# Patient Record
Sex: FEMALE | Race: WHITE | NOT HISPANIC OR LATINO | URBAN - METROPOLITAN AREA
[De-identification: names, ages, dates, MRNs, and addresses within clinical notes are randomized per-mention and may not be internally consistent; named-entity substitution may affect disease eponyms.]

---

## 2018-02-12 ENCOUNTER — INPATIENT (INPATIENT)
Facility: HOSPITAL | Age: 78
LOS: 9 days | Discharge: HOME | DRG: 208 | End: 2018-02-22
Attending: INTERNAL MEDICINE | Admitting: INTERNAL MEDICINE
Payer: MEDICARE

## 2018-02-12 VITALS
HEIGHT: 63 IN | DIASTOLIC BLOOD PRESSURE: 58 MMHG | HEART RATE: 114 BPM | OXYGEN SATURATION: 100 % | RESPIRATION RATE: 20 BRPM | SYSTOLIC BLOOD PRESSURE: 161 MMHG | WEIGHT: 179.9 LBS

## 2018-02-12 DIAGNOSIS — G35 MULTIPLE SCLEROSIS: ICD-10-CM

## 2018-02-12 DIAGNOSIS — I63.9 CEREBRAL INFARCTION, UNSPECIFIED: ICD-10-CM

## 2018-02-12 DIAGNOSIS — J96.00 ACUTE RESPIRATORY FAILURE, UNSPECIFIED WHETHER WITH HYPOXIA OR HYPERCAPNIA: ICD-10-CM

## 2018-02-12 DIAGNOSIS — R90.89 OTHER ABNORMAL FINDINGS ON DIAGNOSTIC IMAGING OF CENTRAL NERVOUS SYSTEM: ICD-10-CM

## 2018-02-12 DIAGNOSIS — I10 ESSENTIAL (PRIMARY) HYPERTENSION: ICD-10-CM

## 2018-02-12 DIAGNOSIS — E11.9 TYPE 2 DIABETES MELLITUS WITHOUT COMPLICATIONS: ICD-10-CM

## 2018-02-12 DIAGNOSIS — Z29.9 ENCOUNTER FOR PROPHYLACTIC MEASURES, UNSPECIFIED: ICD-10-CM

## 2018-02-12 DIAGNOSIS — J96.90 RESPIRATORY FAILURE, UNSPECIFIED, UNSPECIFIED WHETHER WITH HYPOXIA OR HYPERCAPNIA: ICD-10-CM

## 2018-02-12 DIAGNOSIS — E03.9 HYPOTHYROIDISM, UNSPECIFIED: ICD-10-CM

## 2018-02-12 PROBLEM — Z00.00 ENCOUNTER FOR PREVENTIVE HEALTH EXAMINATION: Status: ACTIVE | Noted: 2018-02-12

## 2018-02-12 LAB
ALBUMIN SERPL ELPH-MCNC: 2.5 G/DL — LOW (ref 3.5–5)
ALP SERPL-CCNC: 63 U/L — SIGNIFICANT CHANGE UP (ref 40–120)
ALT FLD-CCNC: 17 U/L DA — SIGNIFICANT CHANGE UP (ref 10–60)
ANION GAP SERPL CALC-SCNC: 14 MMOL/L — SIGNIFICANT CHANGE UP (ref 5–17)
ANION GAP SERPL CALC-SCNC: 14 MMOL/L — SIGNIFICANT CHANGE UP (ref 5–17)
ANION GAP SERPL CALC-SCNC: 15 MMOL/L — SIGNIFICANT CHANGE UP (ref 5–17)
APPEARANCE UR: ABNORMAL
APTT BLD: 30.1 SEC — SIGNIFICANT CHANGE UP (ref 27.5–37.4)
AST SERPL-CCNC: 36 U/L — SIGNIFICANT CHANGE UP (ref 10–40)
BASE EXCESS BLDA CALC-SCNC: -6.1 MMOL/L — LOW (ref -2–2)
BASOPHILS # BLD AUTO: 0.1 K/UL — SIGNIFICANT CHANGE UP (ref 0–0.2)
BASOPHILS NFR BLD AUTO: 0.7 % — SIGNIFICANT CHANGE UP (ref 0–2)
BILIRUB SERPL-MCNC: 0.6 MG/DL — SIGNIFICANT CHANGE UP (ref 0.2–1.2)
BILIRUB UR-MCNC: NEGATIVE — SIGNIFICANT CHANGE UP
BLOOD GAS COMMENTS ARTERIAL: SIGNIFICANT CHANGE UP
BLOOD GAS COMMENTS ARTERIAL: SIGNIFICANT CHANGE UP
BUN SERPL-MCNC: 38 MG/DL — HIGH (ref 7–18)
BUN SERPL-MCNC: 38 MG/DL — HIGH (ref 7–18)
BUN SERPL-MCNC: 41 MG/DL — HIGH (ref 7–18)
CALCIUM SERPL-MCNC: 7.6 MG/DL — LOW (ref 8.4–10.5)
CALCIUM SERPL-MCNC: 7.9 MG/DL — LOW (ref 8.4–10.5)
CALCIUM SERPL-MCNC: 8.1 MG/DL — LOW (ref 8.4–10.5)
CHLORIDE SERPL-SCNC: 100 MMOL/L — SIGNIFICANT CHANGE UP (ref 96–108)
CHLORIDE SERPL-SCNC: 104 MMOL/L — SIGNIFICANT CHANGE UP (ref 96–108)
CHLORIDE SERPL-SCNC: 106 MMOL/L — SIGNIFICANT CHANGE UP (ref 96–108)
CK MB BLD-MCNC: 1.6 % — SIGNIFICANT CHANGE UP (ref 0–3.5)
CK MB CFR SERPL CALC: 2.1 NG/ML — SIGNIFICANT CHANGE UP (ref 0–3.6)
CK SERPL-CCNC: 132 U/L — SIGNIFICANT CHANGE UP (ref 21–215)
CK SERPL-CCNC: 155 U/L — SIGNIFICANT CHANGE UP (ref 21–215)
CO2 SERPL-SCNC: 19 MMOL/L — LOW (ref 22–31)
CO2 SERPL-SCNC: 20 MMOL/L — LOW (ref 22–31)
CO2 SERPL-SCNC: 21 MMOL/L — LOW (ref 22–31)
COLOR SPEC: ABNORMAL
CREAT SERPL-MCNC: 2.45 MG/DL — HIGH (ref 0.5–1.3)
CREAT SERPL-MCNC: 2.65 MG/DL — HIGH (ref 0.5–1.3)
CREAT SERPL-MCNC: 2.77 MG/DL — HIGH (ref 0.5–1.3)
DIFF PNL FLD: ABNORMAL
EOSINOPHIL # BLD AUTO: 0 K/UL — SIGNIFICANT CHANGE UP (ref 0–0.5)
EOSINOPHIL NFR BLD AUTO: 0 % — SIGNIFICANT CHANGE UP (ref 0–6)
FLUBV RNA SPEC QL NAA+PROBE: DETECTED
GLUCOSE BLDC GLUCOMTR-MCNC: 108 MG/DL — HIGH (ref 70–99)
GLUCOSE BLDC GLUCOMTR-MCNC: 119 MG/DL — HIGH (ref 70–99)
GLUCOSE BLDC GLUCOMTR-MCNC: 65 MG/DL — LOW (ref 70–99)
GLUCOSE BLDC GLUCOMTR-MCNC: 99 MG/DL — SIGNIFICANT CHANGE UP (ref 70–99)
GLUCOSE SERPL-MCNC: 136 MG/DL — HIGH (ref 70–99)
GLUCOSE SERPL-MCNC: 149 MG/DL — HIGH (ref 70–99)
GLUCOSE SERPL-MCNC: 89 MG/DL — SIGNIFICANT CHANGE UP (ref 70–99)
GLUCOSE UR QL: NEGATIVE — SIGNIFICANT CHANGE UP
HCO3 BLDA-SCNC: 19 MMOL/L — LOW (ref 23–27)
HCT VFR BLD CALC: 35.2 % — SIGNIFICANT CHANGE UP (ref 34.5–45)
HGB BLD-MCNC: 11.3 G/DL — LOW (ref 11.5–15.5)
HOROWITZ INDEX BLDA+IHG-RTO: 50 — SIGNIFICANT CHANGE UP
INR BLD: 1.15 RATIO — SIGNIFICANT CHANGE UP (ref 0.88–1.16)
KETONES UR-MCNC: ABNORMAL
LACTATE SERPL-SCNC: 2.2 MMOL/L — HIGH (ref 0.7–2)
LACTATE SERPL-SCNC: 3.3 MMOL/L — HIGH (ref 0.7–2)
LACTATE SERPL-SCNC: 5.6 MMOL/L — CRITICAL HIGH (ref 0.7–2)
LEUKOCYTE ESTERASE UR-ACNC: ABNORMAL
LYMPHOCYTES # BLD AUTO: 0.7 K/UL — LOW (ref 1–3.3)
LYMPHOCYTES # BLD AUTO: 6.6 % — LOW (ref 13–44)
MCHC RBC-ENTMCNC: 29.2 PG — SIGNIFICANT CHANGE UP (ref 27–34)
MCHC RBC-ENTMCNC: 32.2 GM/DL — SIGNIFICANT CHANGE UP (ref 32–36)
MCV RBC AUTO: 90.8 FL — SIGNIFICANT CHANGE UP (ref 80–100)
MONOCYTES # BLD AUTO: 1.1 K/UL — HIGH (ref 0–0.9)
MONOCYTES NFR BLD AUTO: 10.3 % — SIGNIFICANT CHANGE UP (ref 2–14)
NEUTROPHILS # BLD AUTO: 8.6 K/UL — HIGH (ref 1.8–7.4)
NEUTROPHILS NFR BLD AUTO: 82.4 % — HIGH (ref 43–77)
NITRITE UR-MCNC: NEGATIVE — SIGNIFICANT CHANGE UP
NT-PROBNP SERPL-SCNC: 2575 PG/ML — HIGH (ref 0–450)
PCO2 BLDA: 38 MMHG — SIGNIFICANT CHANGE UP (ref 32–46)
PH BLDA: 7.32 — LOW (ref 7.35–7.45)
PH UR: 6 — SIGNIFICANT CHANGE UP (ref 5–8)
PLATELET # BLD AUTO: 175 K/UL — SIGNIFICANT CHANGE UP (ref 150–400)
PO2 BLDA: 95 MMHG — SIGNIFICANT CHANGE UP (ref 74–108)
POTASSIUM SERPL-MCNC: 4.5 MMOL/L — SIGNIFICANT CHANGE UP (ref 3.5–5.3)
POTASSIUM SERPL-MCNC: 4.5 MMOL/L — SIGNIFICANT CHANGE UP (ref 3.5–5.3)
POTASSIUM SERPL-MCNC: 5.5 MMOL/L — HIGH (ref 3.5–5.3)
POTASSIUM SERPL-SCNC: 4.5 MMOL/L — SIGNIFICANT CHANGE UP (ref 3.5–5.3)
POTASSIUM SERPL-SCNC: 4.5 MMOL/L — SIGNIFICANT CHANGE UP (ref 3.5–5.3)
POTASSIUM SERPL-SCNC: 5.5 MMOL/L — HIGH (ref 3.5–5.3)
PROT SERPL-MCNC: 6.4 G/DL — SIGNIFICANT CHANGE UP (ref 6–8.3)
PROT UR-MCNC: 100
PROTHROM AB SERPL-ACNC: 12.6 SEC — SIGNIFICANT CHANGE UP (ref 9.8–12.7)
RAPID RVP RESULT: DETECTED
RBC # BLD: 3.88 M/UL — SIGNIFICANT CHANGE UP (ref 3.8–5.2)
RBC # FLD: 13.4 % — SIGNIFICANT CHANGE UP (ref 10.3–14.5)
SAO2 % BLDA: 96 % — SIGNIFICANT CHANGE UP (ref 92–96)
SODIUM SERPL-SCNC: 135 MMOL/L — SIGNIFICANT CHANGE UP (ref 135–145)
SODIUM SERPL-SCNC: 139 MMOL/L — SIGNIFICANT CHANGE UP (ref 135–145)
SODIUM SERPL-SCNC: 139 MMOL/L — SIGNIFICANT CHANGE UP (ref 135–145)
SP GR SPEC: 1.01 — SIGNIFICANT CHANGE UP (ref 1.01–1.02)
TROPONIN I SERPL-MCNC: 0.1 NG/ML — HIGH (ref 0–0.04)
TROPONIN I SERPL-MCNC: 0.11 NG/ML — HIGH (ref 0–0.04)
TSH SERPL-MCNC: 1.12 UU/ML — SIGNIFICANT CHANGE UP (ref 0.34–4.82)
UROBILINOGEN FLD QL: NEGATIVE — SIGNIFICANT CHANGE UP
WBC # BLD: 10.4 K/UL — SIGNIFICANT CHANGE UP (ref 3.8–10.5)
WBC # FLD AUTO: 10.4 K/UL — SIGNIFICANT CHANGE UP (ref 3.8–10.5)

## 2018-02-12 PROCEDURE — 71045 X-RAY EXAM CHEST 1 VIEW: CPT | Mod: 26,77

## 2018-02-12 PROCEDURE — 99291 CRITICAL CARE FIRST HOUR: CPT | Mod: 25

## 2018-02-12 PROCEDURE — 70450 CT HEAD/BRAIN W/O DYE: CPT | Mod: 26

## 2018-02-12 PROCEDURE — 31605 EMER TRACHEOSTOMY CTHYR MEM: CPT

## 2018-02-12 PROCEDURE — 71045 X-RAY EXAM CHEST 1 VIEW: CPT | Mod: 26

## 2018-02-12 PROCEDURE — 99291 CRITICAL CARE FIRST HOUR: CPT

## 2018-02-12 RX ORDER — PROPOFOL 10 MG/ML
5 INJECTION, EMULSION INTRAVENOUS
Qty: 1000 | Refills: 0 | Status: DISCONTINUED | OUTPATIENT
Start: 2018-02-12 | End: 2018-02-12

## 2018-02-12 RX ORDER — SODIUM CHLORIDE 9 MG/ML
1000 INJECTION INTRAMUSCULAR; INTRAVENOUS; SUBCUTANEOUS ONCE
Qty: 0 | Refills: 0 | Status: COMPLETED | OUTPATIENT
Start: 2018-02-12 | End: 2018-02-12

## 2018-02-12 RX ORDER — CITALOPRAM 10 MG/1
5 TABLET, FILM COATED ORAL AT BEDTIME
Qty: 0 | Refills: 0 | Status: DISCONTINUED | OUTPATIENT
Start: 2018-02-12 | End: 2018-02-12

## 2018-02-12 RX ORDER — DONEPEZIL HYDROCHLORIDE 10 MG/1
10 TABLET, FILM COATED ORAL AT BEDTIME
Qty: 0 | Refills: 0 | Status: DISCONTINUED | OUTPATIENT
Start: 2018-02-12 | End: 2018-02-22

## 2018-02-12 RX ORDER — PHENYLEPHRINE HYDROCHLORIDE 10 MG/ML
1 INJECTION INTRAVENOUS
Qty: 160 | Refills: 0 | Status: DISCONTINUED | OUTPATIENT
Start: 2018-02-12 | End: 2018-02-13

## 2018-02-12 RX ORDER — FENTANYL CITRATE 50 UG/ML
0.5 INJECTION INTRAVENOUS
Qty: 2500 | Refills: 0 | Status: DISCONTINUED | OUTPATIENT
Start: 2018-02-12 | End: 2018-02-13

## 2018-02-12 RX ORDER — SODIUM CHLORIDE 9 MG/ML
2000 INJECTION INTRAMUSCULAR; INTRAVENOUS; SUBCUTANEOUS ONCE
Qty: 0 | Refills: 0 | Status: COMPLETED | OUTPATIENT
Start: 2018-02-12 | End: 2018-02-12

## 2018-02-12 RX ORDER — AZITHROMYCIN 500 MG/1
500 TABLET, FILM COATED ORAL EVERY 24 HOURS
Qty: 0 | Refills: 0 | Status: DISCONTINUED | OUTPATIENT
Start: 2018-02-12 | End: 2018-02-17

## 2018-02-12 RX ORDER — MIDAZOLAM HYDROCHLORIDE 1 MG/ML
2 INJECTION, SOLUTION INTRAMUSCULAR; INTRAVENOUS ONCE
Qty: 0 | Refills: 0 | Status: DISCONTINUED | OUTPATIENT
Start: 2018-02-12 | End: 2018-02-12

## 2018-02-12 RX ORDER — CHLORHEXIDINE GLUCONATE 213 G/1000ML
1 SOLUTION TOPICAL DAILY
Qty: 0 | Refills: 0 | Status: DISCONTINUED | OUTPATIENT
Start: 2018-02-12 | End: 2018-02-22

## 2018-02-12 RX ORDER — RALOXIFENE HYDROCHLORIDE 60 MG/1
60 TABLET, COATED ORAL DAILY
Qty: 0 | Refills: 0 | Status: DISCONTINUED | OUTPATIENT
Start: 2018-02-12 | End: 2018-02-22

## 2018-02-12 RX ORDER — ETOMIDATE 2 MG/ML
20 INJECTION INTRAVENOUS ONCE
Qty: 0 | Refills: 0 | Status: COMPLETED | OUTPATIENT
Start: 2018-02-12 | End: 2018-02-12

## 2018-02-12 RX ORDER — SODIUM CHLORIDE 9 MG/ML
1000 INJECTION, SOLUTION INTRAVENOUS
Qty: 0 | Refills: 0 | Status: DISCONTINUED | OUTPATIENT
Start: 2018-02-12 | End: 2018-02-13

## 2018-02-12 RX ORDER — MIDAZOLAM HYDROCHLORIDE 1 MG/ML
2 INJECTION, SOLUTION INTRAMUSCULAR; INTRAVENOUS
Qty: 100 | Refills: 0 | Status: DISCONTINUED | OUTPATIENT
Start: 2018-02-12 | End: 2018-02-12

## 2018-02-12 RX ORDER — ASPIRIN/CALCIUM CARB/MAGNESIUM 324 MG
81 TABLET ORAL DAILY
Qty: 0 | Refills: 0 | Status: DISCONTINUED | OUTPATIENT
Start: 2018-02-12 | End: 2018-02-22

## 2018-02-12 RX ORDER — LEVOTHYROXINE SODIUM 125 MCG
25 TABLET ORAL DAILY
Qty: 0 | Refills: 0 | Status: DISCONTINUED | OUTPATIENT
Start: 2018-02-12 | End: 2018-02-17

## 2018-02-12 RX ORDER — ACETAMINOPHEN 500 MG
650 TABLET ORAL ONCE
Qty: 0 | Refills: 0 | Status: COMPLETED | OUTPATIENT
Start: 2018-02-12 | End: 2018-02-12

## 2018-02-12 RX ORDER — BACLOFEN 100 %
10 POWDER (GRAM) MISCELLANEOUS THREE TIMES A DAY
Qty: 0 | Refills: 0 | Status: DISCONTINUED | OUTPATIENT
Start: 2018-02-12 | End: 2018-02-22

## 2018-02-12 RX ORDER — HEPARIN SODIUM 5000 [USP'U]/ML
5000 INJECTION INTRAVENOUS; SUBCUTANEOUS EVERY 12 HOURS
Qty: 0 | Refills: 0 | Status: DISCONTINUED | OUTPATIENT
Start: 2018-02-12 | End: 2018-02-22

## 2018-02-12 RX ORDER — DEXTROSE 50 % IN WATER 50 %
25 SYRINGE (ML) INTRAVENOUS ONCE
Qty: 0 | Refills: 0 | Status: COMPLETED | OUTPATIENT
Start: 2018-02-12 | End: 2018-02-12

## 2018-02-12 RX ORDER — CITALOPRAM 10 MG/1
5 TABLET, FILM COATED ORAL AT BEDTIME
Qty: 0 | Refills: 0 | Status: DISCONTINUED | OUTPATIENT
Start: 2018-02-12 | End: 2018-02-22

## 2018-02-12 RX ORDER — ACETAMINOPHEN 500 MG
650 TABLET ORAL EVERY 6 HOURS
Qty: 0 | Refills: 0 | Status: DISCONTINUED | OUTPATIENT
Start: 2018-02-12 | End: 2018-02-22

## 2018-02-12 RX ORDER — CEFTRIAXONE 500 MG/1
1 INJECTION, POWDER, FOR SOLUTION INTRAMUSCULAR; INTRAVENOUS EVERY 24 HOURS
Qty: 0 | Refills: 0 | Status: DISCONTINUED | OUTPATIENT
Start: 2018-02-12 | End: 2018-02-19

## 2018-02-12 RX ORDER — INSULIN LISPRO 100/ML
VIAL (ML) SUBCUTANEOUS
Qty: 0 | Refills: 0 | Status: DISCONTINUED | OUTPATIENT
Start: 2018-02-12 | End: 2018-02-13

## 2018-02-12 RX ORDER — ATORVASTATIN CALCIUM 80 MG/1
20 TABLET, FILM COATED ORAL AT BEDTIME
Qty: 0 | Refills: 0 | Status: DISCONTINUED | OUTPATIENT
Start: 2018-02-12 | End: 2018-02-22

## 2018-02-12 RX ORDER — PIPERACILLIN AND TAZOBACTAM 4; .5 G/20ML; G/20ML
3.38 INJECTION, POWDER, LYOPHILIZED, FOR SOLUTION INTRAVENOUS ONCE
Qty: 0 | Refills: 0 | Status: COMPLETED | OUTPATIENT
Start: 2018-02-12 | End: 2018-02-12

## 2018-02-12 RX ORDER — PANTOPRAZOLE SODIUM 20 MG/1
40 TABLET, DELAYED RELEASE ORAL DAILY
Qty: 0 | Refills: 0 | Status: DISCONTINUED | OUTPATIENT
Start: 2018-02-12 | End: 2018-02-22

## 2018-02-12 RX ORDER — LOSARTAN POTASSIUM 100 MG/1
50 TABLET, FILM COATED ORAL DAILY
Qty: 0 | Refills: 0 | Status: DISCONTINUED | OUTPATIENT
Start: 2018-02-12 | End: 2018-02-12

## 2018-02-12 RX ORDER — MIDAZOLAM HYDROCHLORIDE 1 MG/ML
4 INJECTION, SOLUTION INTRAMUSCULAR; INTRAVENOUS ONCE
Qty: 0 | Refills: 0 | Status: DISCONTINUED | OUTPATIENT
Start: 2018-02-12 | End: 2018-02-12

## 2018-02-12 RX ADMIN — Medication 30 MILLIGRAM(S): at 20:05

## 2018-02-12 RX ADMIN — SODIUM CHLORIDE 2000 MILLILITER(S): 9 INJECTION INTRAMUSCULAR; INTRAVENOUS; SUBCUTANEOUS at 14:28

## 2018-02-12 RX ADMIN — ETOMIDATE 20 MILLIGRAM(S): 2 INJECTION INTRAVENOUS at 09:58

## 2018-02-12 RX ADMIN — Medication 81 MILLIGRAM(S): at 22:52

## 2018-02-12 RX ADMIN — CHLORHEXIDINE GLUCONATE 1 APPLICATION(S): 213 SOLUTION TOPICAL at 20:03

## 2018-02-12 RX ADMIN — MIDAZOLAM HYDROCHLORIDE 2 MG/HR: 1 INJECTION, SOLUTION INTRAMUSCULAR; INTRAVENOUS at 10:08

## 2018-02-12 RX ADMIN — SODIUM CHLORIDE 2000 MILLILITER(S): 9 INJECTION INTRAMUSCULAR; INTRAVENOUS; SUBCUTANEOUS at 09:28

## 2018-02-12 RX ADMIN — PIPERACILLIN AND TAZOBACTAM 200 GRAM(S): 4; .5 INJECTION, POWDER, LYOPHILIZED, FOR SOLUTION INTRAVENOUS at 10:50

## 2018-02-12 RX ADMIN — DONEPEZIL HYDROCHLORIDE 10 MILLIGRAM(S): 10 TABLET, FILM COATED ORAL at 23:17

## 2018-02-12 RX ADMIN — Medication 25 MILLILITER(S): at 18:00

## 2018-02-12 RX ADMIN — PHENYLEPHRINE HYDROCHLORIDE 11.91 MICROGRAM(S)/KG/MIN: 10 INJECTION INTRAVENOUS at 19:59

## 2018-02-12 RX ADMIN — SODIUM CHLORIDE 4000 MILLILITER(S): 9 INJECTION INTRAMUSCULAR; INTRAVENOUS; SUBCUTANEOUS at 20:05

## 2018-02-12 RX ADMIN — Medication 10 MILLIGRAM(S): at 23:05

## 2018-02-12 RX ADMIN — Medication 650 MILLIGRAM(S): at 10:09

## 2018-02-12 RX ADMIN — CEFTRIAXONE 100 GRAM(S): 500 INJECTION, POWDER, FOR SOLUTION INTRAMUSCULAR; INTRAVENOUS at 18:26

## 2018-02-12 RX ADMIN — SODIUM CHLORIDE 100 MILLILITER(S): 9 INJECTION, SOLUTION INTRAVENOUS at 20:01

## 2018-02-12 RX ADMIN — ATORVASTATIN CALCIUM 20 MILLIGRAM(S): 80 TABLET, FILM COATED ORAL at 22:52

## 2018-02-12 RX ADMIN — FENTANYL CITRATE 1.59 MICROGRAM(S)/KG/HR: 50 INJECTION INTRAVENOUS at 22:53

## 2018-02-12 RX ADMIN — HEPARIN SODIUM 5000 UNIT(S): 5000 INJECTION INTRAVENOUS; SUBCUTANEOUS at 18:30

## 2018-02-12 RX ADMIN — Medication 40 MILLIGRAM(S): at 18:31

## 2018-02-12 RX ADMIN — MIDAZOLAM HYDROCHLORIDE 4 MILLIGRAM(S): 1 INJECTION, SOLUTION INTRAMUSCULAR; INTRAVENOUS at 09:26

## 2018-02-12 RX ADMIN — MIDAZOLAM HYDROCHLORIDE 2 MILLIGRAM(S): 1 INJECTION, SOLUTION INTRAMUSCULAR; INTRAVENOUS at 10:08

## 2018-02-12 RX ADMIN — CITALOPRAM 5 MILLIGRAM(S): 10 TABLET, FILM COATED ORAL at 23:05

## 2018-02-12 RX ADMIN — Medication 40 MILLIGRAM(S): at 23:08

## 2018-02-12 NOTE — CONSULT NOTE ADULT - ASSESSMENT
Abnormal imaging brain, suggestive of age indeterminate infract in the patient with MS and prior strokes, neurological exam currently limited and baseline neurological exam is uknown.  Will recommend MRI brain with and w/o florentino when medically stable, will reevaluate for treatment after imaging.  Cw outpatient treatment for MS.

## 2018-02-12 NOTE — H&P ADULT - PMH
CVA (cerebral vascular accident)    Diabetes mellitus    Hypertension    Hypothyroidism    MS (multiple sclerosis)

## 2018-02-12 NOTE — H&P ADULT - ATTENDING COMMENTS
77 F from home, lives with friend PMH of MS, HTN, HLD , MS, CVA with left hemiparesis , bed bound at baseline was brought in by friend for respiratory distress. As per friend, she (herself) had a flu recently and she noticed pt doesn't look right on friday night , called the PCP , recommended to gave cold medication. Pt did not c/o of any pain or SOB but noticed coughing. This morning she was found o be making gurgling noises and secretion/mucus coming out of her mouth. EMS arrived found her to be in respiratory distress with low sPO2 and accessory muscle use , endotracheal intubation was attempted twice with 40 mg of etomidate but failed , oral airway was inserted and ventilation via BMV were continued.   Patient getting admitted to ICU for acute respiratory failure requiring mechanical ventilation , awaiting RVP and head CT.     Problem/Plan - 1:  ·  Problem: Acute respiratory failure.  Plan: Unclear etiology at this time , viral infection vs stroke   Follow up RVP   follow up CT head to rule out intracranial hemorrhage  Continue with ventilator support   follow up post intubation ABG and adjust setting accordingly.      Problem/Plan - 2:  ·  Problem: Hypertension.  Plan: Will hold home losartan 50 mg , restart if BP continues to trend high.      Problem/Plan - 3:  ·  Problem: MS (multiple sclerosis).  Plan: will continue with home baclofen   Neuro consult.      Problem/Plan - 4:  ·  Problem: Hypothyroidism.  Plan: Continue with hoem dose of levothyroxine   Follow up TSH.      Problem/Plan - 5:  ·  Problem: Diabetes mellitus.  Plan: Hold PO antihyperglycemic  Continue with HSS   Follow up HbA1c.      Problem/Plan - 6:  Problem: CVA (cerebral vascular accident).Plan: Patient has hx of CVA with left hemiparesis but only takes lipitor 20 mg ( not on aspirin)   Start aspirin if CT head -ve for intracranial bleed   C/w statin.     Problem/Plan - 7:  ·  Problem: Prophylactic measure.  Plan: Will start DVT ppx if CT head negative   C/w protonix for GI ppx.

## 2018-02-12 NOTE — ED PROVIDER NOTE - CRITICAL CARE PROVIDED
direct patient care (not related to procedure)/consultation with other physicians/conducted a detailed discussion of DNR status/documentation/additional history taking

## 2018-02-12 NOTE — H&P ADULT - PROBLEM SELECTOR PLAN 1
Unclear etiology at this time , viral infection vs stroke   Follow up RVP   follow up CT head to rule out intracranial hemorrhage  Continue with ventilator support   follow up post intubation ABG and adjust setting accordingly

## 2018-02-12 NOTE — H&P ADULT - PROBLEM SELECTOR PLAN 6
Patient has hx of CVA with left hemiparesis but only takes lipitor 20 mg ( not on aspirin)   Start aspirin if CT head -ve for intracranial bleed   C/w statin

## 2018-02-12 NOTE — CONSULT NOTE ADULT - SUBJECTIVE AND OBJECTIVE BOX
Patient is a 77y old  Female who presents with a chief complaint of Respiratory failure (2018 10:41)      HPI:  77 F from home, lives with friend PMH of MS, HTN, HLD , MS, CVA with left hemiparesis , bed bound at baseline was brought in by friend for respiratory distress. As per friend, she (herself) had a flu recently and she noticed pt doesn't look right on friday night , called the PCP , recommended to gave cold medication. Pt did not c/o of any pain or SOB but noticed coughing. This morning she was found o be making gurgling noises and secretion/mucus coming out of her mouth. EMS arrived found her to be in respiratory distress with low sPO2 and accessory muscle use , endotracheal intubation was attempted twice with 40 mg of etomidate but failed , oral airway was inserted and ventilation via BMV were continued.   As per friend she has no other family member and she takes care of her , reported having HCP at home and does not want her to be resuscitated if her heart stops; stated "if she goes let her go". (2018 10:41)         Neurological Review of Systems:  No difficulty with language.  No vision loss or double vision.  No dizziness, vertigo or new hearing loss.  No difficulty with speech or swallowing.  No focal weakness.  No focal sensory changes.  No numbness or tingling in the bilateral lower extremities.  No difficulty with balance.  No difficulty with ambulation.        MEDICATIONS  (STANDING):  aspirin  chewable 81 milliGRAM(s) Oral daily  atorvastatin 20 milliGRAM(s) Oral at bedtime  azithromycin  IVPB 500 milliGRAM(s) IV Intermittent every 24 hours  baclofen 10 milliGRAM(s) Oral three times a day  cefTRIAXone   IVPB 1 Gram(s) IV Intermittent every 24 hours  chlorhexidine 4% Liquid 1 Application(s) Topical daily  citalopram 5 milliGRAM(s) Oral at bedtime  dextrose 5% + sodium chloride 0.9%. 1000 milliLiter(s) (100 mL/Hr) IV Continuous <Continuous>  donepezil 10 milliGRAM(s) Oral at bedtime  heparin  Injectable 5000 Unit(s) SubCutaneous every 12 hours  insulin lispro (HumaLOG) corrective regimen sliding scale   SubCutaneous three times a day before meals  levothyroxine 25 MICROGram(s) Oral daily  methylPREDNISolone sodium succinate Injectable 40 milliGRAM(s) IV Push every 6 hours  midazolam Infusion 2 mG/Hr (2 mL/Hr) IV Continuous <Continuous>  pantoprazole  Injectable 40 milliGRAM(s) IV Push daily  raloxifene 60 milliGRAM(s) Oral daily    MEDICATIONS  (PRN):  acetaminophen    Suspension 650 milliGRAM(s) Oral every 6 hours PRN For Temp greater than 38 C (100.4 F)    Allergies    Allergy Status Unknown    Intolerances      PAST MEDICAL & SURGICAL HISTORY:  MS (multiple sclerosis)  CVA (cerebral vascular accident)  Diabetes mellitus  Hypertension  Hypothyroidism    FAMILY HISTORY:    SOCIAL HISTORY: non smoker/ former smoker/ active smoker    Review of Systems:  Constitutional: No generalized weakness. No fevers or chills.                    Eyes, Ears, Mouth, Throat: No vision loss   Respiratory: No shortness of breath or cough.                                Cardiovascular: No chest pain or palpitations  Gastrointestinal: No nausea or vomiting.                                         Genitourinary: No urinary incontinence or burning on urination.  Musculoskeletal: No joint pain.                                                           Dermatologic: No rash.  Neurological: as per HPI                                                                      Psychiatric: No behavioral problems.  Endocrine: No known hypoglycemia.               Hematologic/Lymphatic: No easy bleeding.    O:  Vital Signs Last 24 Hrs  T(C): 38.2 (2018 08:56), Max: 38.2 (2018 08:56)  T(F): 100.7 (2018 08:56), Max: 100.7 (2018 08:56)  HR: 95 (2018 14:00) (89 - 114)  BP: 135/54 (2018 14:00) (82/50 - 161/58)  BP(mean): 76 (2018 14:00) (61 - 76)  RR: 14 (2018 14:00) (14 - 30)  SpO2: 96% (2018 14:00) (96% - 100%)    General Exam:   General appearance: No acute distress                 Cardiovascular: Pedal dorsalis pulses intact bilaterally    Mental Status: Orientated to self, date and place.  Attention intact.  No dysarthria, aphasia or neglect.  Knowledge intact.  Registration intact.  Short and long term memory grossly intact.      Cranial Nerves: CN I - not tested.  PERRL, EOMI, VFF, no nystagmus or diplopia.  No APD.  Fundi not visualized.  CN V1-3 intact to light touch and pinprick.  No facial asymmetry.  Hearing intact to finger rub bilaterally.  Tongue, uvula and palate midline.  Sternocleidomastoid and Trapezius intact bilaterally.    Motor:   Tone: normal.                  Strength intact throughout  No pronator drift bilaterally                      No dysmetria on finger-nose-finger or heel-shin-heel  No truncal ataxia.  No resting, postural or action tremor.  No myoclonus.    Sensation: intact to light touch, pinprick, vibration and proprioception    Deep Tendon Reflexes: 1+ bilateral biceps, triceps, brachioradialis, knee and ankle  Toes flexor bilaterally    Gait: normal and stable.  Rhomberg -mikel.    Other:     LABS:                        11.3   10.4  )-----------( 175      ( 2018 09:27 )             35.2     02-12    139  |  104  |  38<H>  ----------------------------<  149<H>  4.5   |  21<L>  |  2.65<H>    Ca    7.6<L>      2018 12:14    TPro  6.4  /  Alb  2.5<L>  /  TBili  0.6  /  DBili  x   /  AST  36  /  ALT  17  /  AlkPhos  63  02-12    PT/INR - ( 2018 09:27 )   PT: 12.6 sec;   INR: 1.15 ratio         PTT - ( 2018 09:27 )  PTT:30.1 sec  Urinalysis Basic - ( 2018 10:29 )    Color: Pink / Appearance: bloody / S.015 / pH: x  Gluc: x / Ketone: Trace  / Bili: Negative / Urobili: Negative   Blood: x / Protein: 100 / Nitrite: Negative   Leuk Esterase: Moderate / RBC: >50 /HPF / WBC 3-5 /HPF   Sq Epi: x / Non Sq Epi: Occasional /HPF / Bacteria: Trace /HPF          RADIOLOGY & ADDITIONAL STUDIES:    EKG:   < from: CT Head No Cont (18 @ 12:25) > (images reviewed)  IMPRESSION:   No acute intracranial hemorrhage, mass effect or midline shift.    Age-indeterminate left inferior cerebellar infarct. Old left superior   cerebellar infarct.    Patchy white matter hypodensities, consistent with reported history of   multiple sclerosis, and probable superimposed small vessel ischemic   disease.    < end of copied text > HPI:  77 F from home, lives with friend PMH of MS, HTN, HLD , MS, CVA with left hemiparesis , bed bound at baseline was brought in by friend for respiratory distress. As per friend, she (herself) had a flu recently and she noticed pt doesn't look right on friday night , called the PCP , recommended to gave cold medication. Pt did not c/o of any pain or SOB but noticed coughing. The patient was found to be making gurgling noises and secretion/mucus coming out of her mouth. EMS arrived found her to be in respiratory distress with low sPO2 and accessory muscle use , endotracheal intubation was attempted twice with 40 mg of etomidate but failed , oral airway was inserted and ventilation via BMV were continued.  Neurology called for abnormal CTH.  Patient unable to give history and baseline neurological function is unknown.    MEDICATIONS  (STANDING):  aspirin  chewable 81 milliGRAM(s) Oral daily  atorvastatin 20 milliGRAM(s) Oral at bedtime  azithromycin  IVPB 500 milliGRAM(s) IV Intermittent every 24 hours  baclofen 10 milliGRAM(s) Oral three times a day  cefTRIAXone   IVPB 1 Gram(s) IV Intermittent every 24 hours  chlorhexidine 4% Liquid 1 Application(s) Topical daily  citalopram 5 milliGRAM(s) Oral at bedtime  dextrose 5% + sodium chloride 0.9%. 1000 milliLiter(s) (100 mL/Hr) IV Continuous <Continuous>  donepezil 10 milliGRAM(s) Oral at bedtime  heparin  Injectable 5000 Unit(s) SubCutaneous every 12 hours  insulin lispro (HumaLOG) corrective regimen sliding scale   SubCutaneous three times a day before meals  levothyroxine 25 MICROGram(s) Oral daily  methylPREDNISolone sodium succinate Injectable 40 milliGRAM(s) IV Push every 6 hours  midazolam Infusion 2 mG/Hr (2 mL/Hr) IV Continuous <Continuous>  pantoprazole  Injectable 40 milliGRAM(s) IV Push daily  raloxifene 60 milliGRAM(s) Oral daily    MEDICATIONS  (PRN):  acetaminophen    Suspension 650 milliGRAM(s) Oral every 6 hours PRN For Temp greater than 38 C (100.4 F)    Allergies    Allergy Status Unknown    Intolerances      PAST MEDICAL & SURGICAL HISTORY:  MS (multiple sclerosis)  CVA (cerebral vascular accident)  Diabetes mellitus  Hypertension  Hypothyroidism  no known relevant prior surgeries    FAMILY HISTORY: unknown    SOCIAL HISTORY: unknonw    Review of Systems:  Constitutional: No fevers.                    Respiratory: intubated, for respiratory distress.                                Gastrointestinal: No vomiting.                                         Endocrine: No known hypoglycemia.                   O:  Vital Signs Last 24 Hrs  T(C): 38.2 (2018 08:56), Max: 38.2 (2018 08:56)  T(F): 100.7 (2018 08:56), Max: 100.7 (2018 08:56)  HR: 95 (2018 14:00) (89 - 114)  BP: 135/54 (2018 14:00) (82/50 - 161/58)  BP(mean): 76 (2018 14:00) (61 - 76)  RR: 14 (2018 14:00) (14 - 30)  SpO2: 96% (2018 14:00) (96% - 100%)    General Exam:   General appearance: No acute distress                 Cardiovascular: Pedal dorsalis pulses intact bilaterally    Mental Status: Eyes closed  but opens eyes with minimal stimulation.  Responds to name but unable to evaluate date and place.  Lethargic. Non verbal, intubated.  Follows only simple, 1 step, requests.    Cranial Nerves: CN I - not tested.  1.5mm bl-> trace, midline, EOMI grossly with nystagmus to the left,  blinks to threat bl temporal areas.  No gross facial asymmetry.      Motor:   Tone: increased on the left arm and bl legs                 moved all 4 ext             unable to evaluate for dysmetria    Sensation: intact to pain in all 4 ext    Deep Tendon Reflexes: 1+ right biceps, triceps, brachioradialis and 3+ on the left, 3+ knee and ankle  Toes extensor bilaterally    Gait: unable to evaluate    Other:     LABS:                        11.3   10.4  )-----------( 175      ( 2018 09:27 )             35.2     02-12    139  |  104  |  38<H>  ----------------------------<  149<H>  4.5   |  21<L>  |  2.65<H>    Ca    7.6<L>      2018 12:14    TPro  6.4  /  Alb  2.5<L>  /  TBili  0.6  /  DBili  x   /  AST  36  /  ALT  17  /  AlkPhos  63  02-12    PT/INR - ( 2018 09:27 )   PT: 12.6 sec;   INR: 1.15 ratio         PTT - ( 2018 09:27 )  PTT:30.1 sec  Urinalysis Basic - ( 2018 10:29 )    Color: Pink / Appearance: bloody / S.015 / pH: x  Gluc: x / Ketone: Trace  / Bili: Negative / Urobili: Negative   Blood: x / Protein: 100 / Nitrite: Negative   Leuk Esterase: Moderate / RBC: >50 /HPF / WBC 3-5 /HPF   Sq Epi: x / Non Sq Epi: Occasional /HPF / Bacteria: Trace /HPF          RADIOLOGY & ADDITIONAL STUDIES:    EKG:  NSR  < from: CT Head No Cont (18 @ 12:25) > (images reviewed)  IMPRESSION:   No acute intracranial hemorrhage, mass effect or midline shift.    Age-indeterminate left inferior cerebellar infarct. Old left superior   cerebellar infarct.    Patchy white matter hypodensities, consistent with reported history of   multiple sclerosis, and probable superimposed small vessel ischemic   disease.    < end of copied text >

## 2018-02-12 NOTE — ED PROVIDER NOTE - MEDICAL DECISION MAKING DETAILS
Pt in respiratory distress, intubated in the ED for airway protection. Rectally febrile. Code Sepsis called. Pt received IV fluids, antibiotics, antipyretics, versed for sedation. Pt to be admitted to ICU Pt in respiratory distress, intubated in the ED for airway protection. Rectally febrile. Code Sepsis called. Pt received IV fluids, antibiotics, antipyretics, versed for sedation. Pt to be admitted to ICU. lots of thick yellow secretions noted in airway during intubation.

## 2018-02-12 NOTE — H&P ADULT - HISTORY OF PRESENT ILLNESS
77 F from home, lives with friend PMH of MS, HTN, HLD , MS, CVA with left hemiparesis , bed bound at baseline was brought in by friend for respiratory distress. As per friend, she (herself) had a flu recently and she noticed pt doesn't look right on friday night , called the PCP , recommended to gave cold medication. Pt did not c/o of any pain or SOB but noticed coughing. This morning she was found o be making gurgling noises and secretion/mucus coming out of her mouth. EMS arrived found her to be in respiratory distress with low sPO2 and accessory muscle use , endotracheal intubation was attempted twice with 40 mg of etomidate but failed , oral airway was inserted and ventilation via BMV were continued.   As per friend she has no other family member and she takes care of her , reported having HCP at home and does not want her to be resuscitated if her heart stops; stated "if she goes let her go".

## 2018-02-12 NOTE — ED PROVIDER NOTE - UNABLE TO OBTAIN
HPI limited secondary to urgent need for intervention. Urgent need for Intervention ROS limited secondary to urgent need for intervention.

## 2018-02-12 NOTE — CONSULT NOTE ADULT - SUBJECTIVE AND OBJECTIVE BOX
Time of visit:    CHIEF COMPLAINT: Patient is a 77y old  Female who presents with a chief complaint of Respiratory failure (2018 10:41)      HPI:  77 F from home, lives with friend PMH of MS, HTN, HLD , MS, CVA with left hemiparesis , bed bound at baseline was brought in by friend for respiratory distress. As per friend, she (herself) had a flu recently and she noticed pt doesn't look right on friday night , called the PCP , recommended to gave cold medication. Pt did not c/o of any pain or SOB but noticed coughing. This morning she was found o be making gurgling noises and secretion/mucus coming out of her mouth. EMS arrived found her to be in respiratory distress with low sPO2 and accessory muscle use , endotracheal intubation was attempted twice with 40 mg of etomidate but failed , oral airway was inserted and ventilation via BMV were continued.   As per friend she has no other family member and she takes care of her , reported having HCP at home and does not want her to be resuscitated if her heart stops; stated "if she goes let her go". (2018 10:41)   Patient seen and examined.     PAST MEDICAL & SURGICAL HISTORY:  MS (multiple sclerosis)  CVA (cerebral vascular accident)  Diabetes mellitus  Hypertension  Hypothyroidism      Allergies    Allergy Status Unknown    Intolerances        MEDICATIONS  (STANDING):  aspirin  chewable 81 milliGRAM(s) Oral daily  atorvastatin 20 milliGRAM(s) Oral at bedtime  azithromycin  IVPB 500 milliGRAM(s) IV Intermittent every 24 hours  baclofen 10 milliGRAM(s) Oral three times a day  cefTRIAXone   IVPB 1 Gram(s) IV Intermittent every 24 hours  chlorhexidine 4% Liquid 1 Application(s) Topical daily  citalopram 5 milliGRAM(s) Oral at bedtime  dextrose 5% + sodium chloride 0.9%. 1000 milliLiter(s) (100 mL/Hr) IV Continuous <Continuous>  donepezil 10 milliGRAM(s) Oral at bedtime  fentaNYL   Infusion 0.5 MICROgram(s)/kG/Hr (1.587 mL/Hr) IV Continuous <Continuous>  heparin  Injectable 5000 Unit(s) SubCutaneous every 12 hours  insulin lispro (HumaLOG) corrective regimen sliding scale   SubCutaneous three times a day before meals  levothyroxine 25 MICROGram(s) Oral daily  methylPREDNISolone sodium succinate Injectable 40 milliGRAM(s) IV Push every 6 hours  oseltamivir 30 milliGRAM(s) Oral daily  pantoprazole  Injectable 40 milliGRAM(s) IV Push daily  phenylephrine    Infusion 1 MICROgram(s)/kG/Min (11.906 mL/Hr) IV Continuous <Continuous>  raloxifene 60 milliGRAM(s) Oral daily      MEDICATIONS  (PRN):  acetaminophen    Suspension 650 milliGRAM(s) Oral every 6 hours PRN For Temp greater than 38 C (100.4 F)       Medications up to date at time of exam.    FAMILY HISTORY:      SOCIAL HISTORY  Smoking History: [   ] smoking/smoke exposure, [   ] former smoker  Living Condition: [   ] apartment, [   ] private house  Work History:   Travel History: denies recent travel  Illicit Substance Use: denies  Alcohol Use: denies    REVIEW OF SYSTEMS: unable to obtain    CONSTITUTIONAL:  denies fevers, chills, sweats, weight loss    HEENT:  denies diplopia or blurred vision, sore throat or runny nose.    CARDIOVASCULAR:  denies pressure, squeezing, tightness, or heaviness about the chest; no palpitations.    RESPIRATORY:  denies SOB, cough, DURÁN, wheezing.    GASTROINTESTINAL:  denies abdominal pain, nausea, vomiting or diarrhea.    GENITOURINARY: denies dysuria, frequency or urgency.    NEUROLOGIC:  denies numbness, tingling, seizures or weakness.    PSYCHIATRIC:  denies disorder of thought or mood.    MSK: denies swelling, redness      PHYSICAL EXAMINATION:    GENERAL: The patient is a well-developed, well-nourished, in no apparent distress.     Vital Signs Last 24 Hrs  T(C): 38.3 (2018 16:00), Max: 38.3 (2018 16:00)  T(F): 101 (2018 16:00), Max: 101 (2018 16:00)  HR: 95 (2018 21:12) (89 - 115)  BP: 84/66 (2018 20:30) (50/19 - 161/58)  BP(mean): 73 (2018 20:30) (29 - 83)  RR: 22 (2018 20:30) (14 - 30)  SpO2: 100% (2018 21:12) (88% - 100%)    HEENT: head is normocephalic and atraumatic. mucous membranes are moist. +ETT    NECK: supple, no palpable adenopathy.    LUNGS: clear to auscultation, no wheezing, rales, or rhonchi.    HEART: regular rate and rhythm without murmur.    ABDOMEN: soft, nontender, and nondistended.     EXTREMITIES: without any cyanosis, clubbing, rash, lesions or edema.    NEUROLOGIC: sedated    SKIN: warm, dry, good turgor.      LABS:                        11.3   10.4  )-----------( 175      ( 2018 09:27 )             35.2         139  |  106  |  38<H>  ----------------------------<  89  4.5   |  19<L>  |  2.45<H>    Ca    8.1<L>      2018 16:40    TPro  6.4  /  Alb  2.5<L>  /  TBili  0.6  /  DBili  x   /  AST  36  /  ALT  17  /  AlkPhos  63  12    PT/INR - ( 2018 09:27 )   PT: 12.6 sec;   INR: 1.15 ratio         PTT - ( 2018 09:27 )  PTT:30.1 sec  Urinalysis Basic - ( 2018 10:29 )    Color: Pink / Appearance: bloody / S.015 / pH: x  Gluc: x / Ketone: Trace  / Bili: Negative / Urobili: Negative   Blood: x / Protein: 100 / Nitrite: Negative   Leuk Esterase: Moderate / RBC: >50 /HPF / WBC 3-5 /HPF   Sq Epi: x / Non Sq Epi: Occasional /HPF / Bacteria: Trace /HPF      ABG - ( 2018 13:46 )  pH: 7.32  /  pCO2: 38    /  pO2: 95    / HCO3: 19    / Base Excess: -6.1  /  SaO2: 96                CARDIAC MARKERS ( 2018 16:40 )  0.100 ng/mL / x     / 132 U/L / x     / 2.1 ng/mL  CARDIAC MARKERS ( 2018 09:27 )  0.114 ng/mL / x     / 155 U/L / x     / x            Serum Pro-Brain Natriuretic Peptide: 2575 pg/mL (18 @ 09:27)    Lactate, Blood: 2.2 mmol/L (18 @ 18:12)  Lactate, Blood: 5.6 mmol/L (18 @ 12:14)  Lactate, Blood: 3.3 mmol/L (18 @ 09:27)        Troponin 0.100  @ 16:40   02- @ 16:40  CKMB --  @ 16:40  Troponin 0.114  @ 09:27    @ 09:27  CKMB --  @ 09:27  .    MICROBIOLOGY: (if applicable)    RADIOLOGY & ADDITIONAL STUDIES:  EKG:   CXR:  ECHO:  TELE:    IMPRESSION: 77y Female PAST MEDICAL & SURGICAL HISTORY:  MS (multiple sclerosis)  CVA (cerebral vascular accident)  Diabetes mellitus  Hypertension  Hypothyroidism   p/w     RECOMMENDATIONS:    Patient presents with SOB, respiratory failure due to flu, increased troponins due to demand ischemia.   Patient with intermittent tachycardia in setting of infection, con't to monitor  BP stable, 2D echo pending.

## 2018-02-12 NOTE — CHART NOTE - NSCHARTNOTEFT_GEN_A_CORE
Miss. Bates brought patient documentation of DNR from Department of health , [place], copy of which has been placed in the chart.

## 2018-02-13 DIAGNOSIS — R74.8 ABNORMAL LEVELS OF OTHER SERUM ENZYMES: ICD-10-CM

## 2018-02-13 DIAGNOSIS — N39.0 URINARY TRACT INFECTION, SITE NOT SPECIFIED: ICD-10-CM

## 2018-02-13 DIAGNOSIS — N17.9 ACUTE KIDNEY FAILURE, UNSPECIFIED: ICD-10-CM

## 2018-02-13 LAB
ALBUMIN SERPL ELPH-MCNC: 2 G/DL — LOW (ref 3.5–5)
ALP SERPL-CCNC: 75 U/L — SIGNIFICANT CHANGE UP (ref 40–120)
ALT FLD-CCNC: 21 U/L DA — SIGNIFICANT CHANGE UP (ref 10–60)
ANION GAP SERPL CALC-SCNC: 11 MMOL/L — SIGNIFICANT CHANGE UP (ref 5–17)
AST SERPL-CCNC: 23 U/L — SIGNIFICANT CHANGE UP (ref 10–40)
BASE EXCESS BLDA CALC-SCNC: -8 MMOL/L — LOW (ref -2–2)
BASOPHILS # BLD AUTO: 0.1 K/UL — SIGNIFICANT CHANGE UP (ref 0–0.2)
BASOPHILS NFR BLD AUTO: 0.5 % — SIGNIFICANT CHANGE UP (ref 0–2)
BILIRUB SERPL-MCNC: 0.3 MG/DL — SIGNIFICANT CHANGE UP (ref 0.2–1.2)
BLOOD GAS COMMENTS ARTERIAL: SIGNIFICANT CHANGE UP
BUN SERPL-MCNC: 30 MG/DL — HIGH (ref 7–18)
CALCIUM SERPL-MCNC: 7.5 MG/DL — LOW (ref 8.4–10.5)
CHLORIDE SERPL-SCNC: 113 MMOL/L — HIGH (ref 96–108)
CO2 SERPL-SCNC: 17 MMOL/L — LOW (ref 22–31)
CREAT SERPL-MCNC: 1.78 MG/DL — HIGH (ref 0.5–1.3)
CULTURE RESULTS: SIGNIFICANT CHANGE UP
EOSINOPHIL # BLD AUTO: 0 K/UL — SIGNIFICANT CHANGE UP (ref 0–0.5)
EOSINOPHIL NFR BLD AUTO: 0 % — SIGNIFICANT CHANGE UP (ref 0–6)
GLUCOSE BLDC GLUCOMTR-MCNC: 214 MG/DL — HIGH (ref 70–99)
GLUCOSE BLDC GLUCOMTR-MCNC: 220 MG/DL — HIGH (ref 70–99)
GLUCOSE BLDC GLUCOMTR-MCNC: 308 MG/DL — HIGH (ref 70–99)
GLUCOSE SERPL-MCNC: 228 MG/DL — HIGH (ref 70–99)
GRAM STN FLD: SIGNIFICANT CHANGE UP
HCO3 BLDA-SCNC: 17 MMOL/L — LOW (ref 23–27)
HCT VFR BLD CALC: 31.7 % — LOW (ref 34.5–45)
HGB BLD-MCNC: 10.3 G/DL — LOW (ref 11.5–15.5)
HOROWITZ INDEX BLDA+IHG-RTO: 50 — SIGNIFICANT CHANGE UP
LYMPHOCYTES # BLD AUTO: 0.4 K/UL — LOW (ref 1–3.3)
LYMPHOCYTES # BLD AUTO: 3.1 % — LOW (ref 13–44)
MAGNESIUM SERPL-MCNC: 1.7 MG/DL — SIGNIFICANT CHANGE UP (ref 1.6–2.6)
MCHC RBC-ENTMCNC: 29.1 PG — SIGNIFICANT CHANGE UP (ref 27–34)
MCHC RBC-ENTMCNC: 32.3 GM/DL — SIGNIFICANT CHANGE UP (ref 32–36)
MCV RBC AUTO: 90 FL — SIGNIFICANT CHANGE UP (ref 80–100)
MONOCYTES # BLD AUTO: 0.4 K/UL — SIGNIFICANT CHANGE UP (ref 0–0.9)
MONOCYTES NFR BLD AUTO: 3 % — SIGNIFICANT CHANGE UP (ref 2–14)
NEUTROPHILS # BLD AUTO: 13.3 K/UL — HIGH (ref 1.8–7.4)
NEUTROPHILS NFR BLD AUTO: 93.4 % — HIGH (ref 43–77)
PCO2 BLDA: 34 MMHG — SIGNIFICANT CHANGE UP (ref 32–46)
PH BLDA: 7.32 — LOW (ref 7.35–7.45)
PHOSPHATE SERPL-MCNC: 2.2 MG/DL — LOW (ref 2.5–4.5)
PLATELET # BLD AUTO: 181 K/UL — SIGNIFICANT CHANGE UP (ref 150–400)
PO2 BLDA: 90 MMHG — SIGNIFICANT CHANGE UP (ref 74–108)
POTASSIUM SERPL-MCNC: 3.9 MMOL/L — SIGNIFICANT CHANGE UP (ref 3.5–5.3)
POTASSIUM SERPL-SCNC: 3.9 MMOL/L — SIGNIFICANT CHANGE UP (ref 3.5–5.3)
PROCALCITONIN SERPL-MCNC: 6.11 NG/ML — HIGH (ref 0–0.04)
PROT SERPL-MCNC: 5.6 G/DL — LOW (ref 6–8.3)
RBC # BLD: 3.53 M/UL — LOW (ref 3.8–5.2)
RBC # FLD: 13.1 % — SIGNIFICANT CHANGE UP (ref 10.3–14.5)
SAO2 % BLDA: 96 % — SIGNIFICANT CHANGE UP (ref 92–96)
SODIUM SERPL-SCNC: 141 MMOL/L — SIGNIFICANT CHANGE UP (ref 135–145)
SPECIMEN SOURCE: SIGNIFICANT CHANGE UP
SPECIMEN SOURCE: SIGNIFICANT CHANGE UP
WBC # BLD: 14.3 K/UL — HIGH (ref 3.8–10.5)
WBC # FLD AUTO: 14.3 K/UL — HIGH (ref 3.8–10.5)

## 2018-02-13 RX ORDER — DEXAMETHASONE 0.5 MG/5ML
10 ELIXIR ORAL ONCE
Qty: 0 | Refills: 0 | Status: DISCONTINUED | OUTPATIENT
Start: 2018-02-13 | End: 2018-02-13

## 2018-02-13 RX ORDER — INSULIN LISPRO 100/ML
VIAL (ML) SUBCUTANEOUS EVERY 6 HOURS
Qty: 0 | Refills: 0 | Status: DISCONTINUED | OUTPATIENT
Start: 2018-02-13 | End: 2018-02-16

## 2018-02-13 RX ORDER — DEXAMETHASONE 0.5 MG/5ML
6 ELIXIR ORAL EVERY 6 HOURS
Qty: 0 | Refills: 0 | Status: COMPLETED | OUTPATIENT
Start: 2018-02-13 | End: 2018-02-14

## 2018-02-13 RX ORDER — POTASSIUM PHOSPHATE, MONOBASIC POTASSIUM PHOSPHATE, DIBASIC 236; 224 MG/ML; MG/ML
15 INJECTION, SOLUTION INTRAVENOUS ONCE
Qty: 0 | Refills: 0 | Status: COMPLETED | OUTPATIENT
Start: 2018-02-13 | End: 2018-02-13

## 2018-02-13 RX ORDER — DEXMEDETOMIDINE HYDROCHLORIDE IN 0.9% SODIUM CHLORIDE 4 UG/ML
0.2 INJECTION INTRAVENOUS
Qty: 200 | Refills: 0 | Status: DISCONTINUED | OUTPATIENT
Start: 2018-02-13 | End: 2018-02-15

## 2018-02-13 RX ORDER — DEXAMETHASONE 0.5 MG/5ML
10 ELIXIR ORAL ONCE
Qty: 0 | Refills: 0 | Status: COMPLETED | OUTPATIENT
Start: 2018-02-13 | End: 2018-02-13

## 2018-02-13 RX ADMIN — HEPARIN SODIUM 5000 UNIT(S): 5000 INJECTION INTRAVENOUS; SUBCUTANEOUS at 06:23

## 2018-02-13 RX ADMIN — Medication 6 MILLIGRAM(S): at 17:32

## 2018-02-13 RX ADMIN — AZITHROMYCIN 250 MILLIGRAM(S): 500 TABLET, FILM COATED ORAL at 06:28

## 2018-02-13 RX ADMIN — HEPARIN SODIUM 5000 UNIT(S): 5000 INJECTION INTRAVENOUS; SUBCUTANEOUS at 17:32

## 2018-02-13 RX ADMIN — Medication 25 MICROGRAM(S): at 06:27

## 2018-02-13 RX ADMIN — CITALOPRAM 5 MILLIGRAM(S): 10 TABLET, FILM COATED ORAL at 22:21

## 2018-02-13 RX ADMIN — PANTOPRAZOLE SODIUM 40 MILLIGRAM(S): 20 TABLET, DELAYED RELEASE ORAL at 12:07

## 2018-02-13 RX ADMIN — DEXMEDETOMIDINE HYDROCHLORIDE IN 0.9% SODIUM CHLORIDE 3.17 MICROGRAM(S)/KG/HR: 4 INJECTION INTRAVENOUS at 22:21

## 2018-02-13 RX ADMIN — DONEPEZIL HYDROCHLORIDE 10 MILLIGRAM(S): 10 TABLET, FILM COATED ORAL at 22:21

## 2018-02-13 RX ADMIN — Medication 4: at 12:10

## 2018-02-13 RX ADMIN — Medication 40 MILLIGRAM(S): at 06:24

## 2018-02-13 RX ADMIN — DEXMEDETOMIDINE HYDROCHLORIDE IN 0.9% SODIUM CHLORIDE 3.17 MICROGRAM(S)/KG/HR: 4 INJECTION INTRAVENOUS at 12:19

## 2018-02-13 RX ADMIN — CEFTRIAXONE 100 GRAM(S): 500 INJECTION, POWDER, FOR SOLUTION INTRAMUSCULAR; INTRAVENOUS at 17:32

## 2018-02-13 RX ADMIN — Medication 10 MILLIGRAM(S): at 22:22

## 2018-02-13 RX ADMIN — Medication 10 MILLIGRAM(S): at 06:23

## 2018-02-13 RX ADMIN — POTASSIUM PHOSPHATE, MONOBASIC POTASSIUM PHOSPHATE, DIBASIC 62.5 MILLIMOLE(S): 236; 224 INJECTION, SOLUTION INTRAVENOUS at 09:44

## 2018-02-13 RX ADMIN — ATORVASTATIN CALCIUM 20 MILLIGRAM(S): 80 TABLET, FILM COATED ORAL at 22:25

## 2018-02-13 RX ADMIN — Medication 202 MILLIGRAM(S): at 12:09

## 2018-02-13 RX ADMIN — Medication 2: at 17:31

## 2018-02-13 RX ADMIN — Medication 2: at 06:29

## 2018-02-13 RX ADMIN — Medication 30 MILLIGRAM(S): at 12:07

## 2018-02-13 RX ADMIN — RALOXIFENE HYDROCHLORIDE 60 MILLIGRAM(S): 60 TABLET, COATED ORAL at 12:08

## 2018-02-13 RX ADMIN — Medication 10 MILLIGRAM(S): at 13:46

## 2018-02-13 RX ADMIN — Medication 81 MILLIGRAM(S): at 12:08

## 2018-02-13 RX ADMIN — CHLORHEXIDINE GLUCONATE 1 APPLICATION(S): 213 SOLUTION TOPICAL at 12:09

## 2018-02-13 NOTE — PROGRESS NOTE ADULT - SUBJECTIVE AND OBJECTIVE BOX
Patient seen and examined.     MEDICATIONS  (STANDING):  aspirin  chewable 81 milliGRAM(s) Oral daily  atorvastatin 20 milliGRAM(s) Oral at bedtime  azithromycin  IVPB 500 milliGRAM(s) IV Intermittent every 24 hours  baclofen 10 milliGRAM(s) Oral three times a day  cefTRIAXone   IVPB 1 Gram(s) IV Intermittent every 24 hours  chlorhexidine 4% Liquid 1 Application(s) Topical daily  citalopram 5 milliGRAM(s) Oral at bedtime  dexamethasone  Injectable 6 milliGRAM(s) IV Push every 6 hours  dexmedetomidine Infusion 0.2 MICROgram(s)/kG/Hr (3.175 mL/Hr) IV Continuous <Continuous>  donepezil 10 milliGRAM(s) Oral at bedtime  heparin  Injectable 5000 Unit(s) SubCutaneous every 12 hours  insulin lispro (HumaLOG) corrective regimen sliding scale   SubCutaneous every 6 hours  levothyroxine 25 MICROGram(s) Oral daily  oseltamivir 30 milliGRAM(s) Oral daily  pantoprazole  Injectable 40 milliGRAM(s) IV Push daily  raloxifene 60 milliGRAM(s) Oral daily      MEDICATIONS  (PRN):  acetaminophen    Suspension 650 milliGRAM(s) Oral every 6 hours PRN For Temp greater than 38 C (100.4 F)   Medications up to date at time of exam.      PHYSICAL EXAMINATION:  Patient is intubated, awake, alert, able to nod Y/N to questions  GENERAL: The patient is a well-developed, well-nourished, in no apparent distress.     Vital Signs Last 24 Hrs  T(C): 36.4 (2018 12:30), Max: 38.3 (2018 16:00)  T(F): 97.5 (2018 12:30), Max: 101 (2018 16:00)  HR: 68 (2018 15:00) (66 - 115)  BP: 119/54 (2018 15:00) (50/19 - 204/76)  BP(mean): 74 (2018 15:00) (29 - 112)  RR: 14 (2018 15:00) (8 - 27)  SpO2: 99% (2018 15:00) (88% - 100%)  Mode: CPAP with PS  FiO2: 50  PEEP: 5  PS: 5  ITime: 1  MAP: 9  PIP: 13   (if applicable)      HEENT: Head is normocephalic and atraumatic.     NECK: Supple, no palpable adenopathy.+intubated    LUNGS: Clear to auscultation, no wheezing, rales, or rhonchi.    HEART: Regular rate and rhythm without murmur.    ABDOMEN: Soft, nontender, and nondistended.  No hepatosplenomegaly is noted.    EXTREMITIES: Without any cyanosis, clubbing, rash, lesions or edema.    NEUROLOGIC: Awake, alert.    SKIN: Warm, dry, good turgor.      LABS:                        10.3   14.3  )-----------( 181      ( 2018 06:25 )             31.7     02-    141  |  113<H>  |  30<H>  ----------------------------<  228<H>  3.9   |  17<L>  |  1.78<H>    Ca    7.5<L>      2018 06:25  Phos  2.2     02-  Mg     1.7     -    TPro  5.6<L>  /  Alb  2.0<L>  /  TBili  0.3  /  DBili  x   /  AST  23  /  ALT  21  /  AlkPhos  75  02-13    PT/INR - ( 2018 09:27 )   PT: 12.6 sec;   INR: 1.15 ratio         PTT - ( 2018 09:27 )  PTT:30.1 sec  Urinalysis Basic - ( 2018 10:29 )    Color: Pink / Appearance: bloody / S.015 / pH: x  Gluc: x / Ketone: Trace  / Bili: Negative / Urobili: Negative   Blood: x / Protein: 100 / Nitrite: Negative   Leuk Esterase: Moderate / RBC: >50 /HPF / WBC 3-5 /HPF   Sq Epi: x / Non Sq Epi: Occasional /HPF / Bacteria: Trace /HPF      ABG - ( 2018 04:43 )  pH: 7.32  /  pCO2: 34    /  pO2: 90    / HCO3: 17    / Base Excess: -8.0  /  SaO2: 96                CARDIAC MARKERS ( 2018 16:40 )  0.100 ng/mL / x     / 132 U/L / x     / 2.1 ng/mL  CARDIAC MARKERS ( 2018 09:27 )  0.114 ng/mL / x     / 155 U/L / x     / x            Serum Pro-Brain Natriuretic Peptide: 2575 pg/mL (18 @ 09:27)    Lactate, Blood: 2.2 mmol/L (18 @ 18:12)    Procalcitonin, Serum: 6.11 ng/mL (18 @ 00:55)      MICROBIOLOGY: (if applicable)    RADIOLOGY & ADDITIONAL STUDIES:  EKG:   CXR:  ECHO:    IMPRESSION: 77y Female PAST MEDICAL & SURGICAL HISTORY:  MS (multiple sclerosis)  CVA (cerebral vascular accident)  Diabetes mellitus  Hypertension  Hypothyroidism       Patient presents with SOB, respiratory failure due to flu, increased troponins due to demand ischemia.   Patient with intermittent tachycardia, in setting of infection, con't to monitor  BP stable, 2D echo pending.   Con't w/ supportive care.

## 2018-02-13 NOTE — PROGRESS NOTE ADULT - ATTENDING COMMENTS
-pat is awake and tolerating SBT but neg leak test. She was a difficult intubation.. EMT unable to intubate in the field  -restart decadron  -ENT eval  _do not extubate

## 2018-02-13 NOTE — PROGRESS NOTE ADULT - PROBLEM SELECTOR PLAN 7
Patient has hx of CVA with left hemiparesis but only takes lipitor 20 mg (not on aspirin)   C/w ASA, Statin Hold PO antihyperglycemic  Continue with HSS   Follow up HbA1c Continue with home dose of levothyroxine   Normal TSH 1.12

## 2018-02-13 NOTE — PROGRESS NOTE ADULT - PROBLEM SELECTOR PLAN 8
DVT ppx with Hep sq  C/w Protonix for GI ppx Patient has hx of CVA with left hemiparesis but only takes lipitor 20 mg (not on aspirin)   C/w ASA, Statin Hold PO antihyperglycemic  Continue with HSS   Follow up HbA1c

## 2018-02-13 NOTE — PROGRESS NOTE ADULT - PROBLEM SELECTOR PLAN 1
Secondary to RVP: Influenza B  Productive secretions with Clear CXR  Suspicion of Acute Bronchitis  Elevated Procalcitonin  CT head: Old age related infarcts  Started Tamiflu in addition to Zithromax  C/w Mechanical Ventilation Secondary to RVP: Influenza B  Productive secretions with Clear CXR  Suspicion of Acute Bronchitis  Elevated Procalcitonin  Leukocytosis 10-->14  CT head: Old age related infarcts  Started Tamiflu in addition to Zithromax  C/w Mechanical Ventilation Secondary to RVP: Influenza B  Productive secretions with Clear CXR  Suspicion of Acute Bronchitis  Elevated Procalcitonin  Leukocytosis 10-->14  CT head: Old age related infarcts  Started Tamiflu in addition to Zithromax  C/w Mechanical Ventilation as patient has difficult airway  Started Decadron and Consulted Dr Wiseman ENT

## 2018-02-13 NOTE — PROGRESS NOTE ADULT - PROBLEM SELECTOR PLAN 3
C/w home baclofen, Citalopram and donepezil   Pt needs MRI gadolinium scan when stable  Dr Yan consulted EKG: NSR  Mildly elevated trop likely due to demand  No Chest pain on admission  F/u Echo  Dr De León on board

## 2018-02-13 NOTE — PROGRESS NOTE ADULT - PROBLEM SELECTOR PLAN 5
Continue with home dose of levothyroxine   Normal TSH 1.12 Holding home losartan 50 mg, restart if BP continues to trend high  Started Fluids and Pheny for hypotension C/w home baclofen, Citalopram and donepezil   Pt needs MRI gadolinium scan when stable  Dr Yan consulted

## 2018-02-13 NOTE — PROGRESS NOTE ADULT - PROBLEM SELECTOR PLAN 4
Holding home losartan 50 mg, restart if BP continues to trend high  Started Fluids and Pheny for hypotension C/w home baclofen, Citalopram and donepezil   Pt needs MRI gadolinium scan when stable  Dr Yan consulted PABLO on probable CKD  Unknown baseline  Improving with IV hydration

## 2018-02-13 NOTE — PROGRESS NOTE ADULT - PROBLEM SELECTOR PLAN 9
DVT ppx with Hep sq  C/w Protonix for GI ppx Patient has hx of CVA with left hemiparesis but only takes lipitor 20 mg (not on aspirin)   C/w ASA, Statin

## 2018-02-13 NOTE — PROGRESS NOTE ADULT - PROBLEM SELECTOR PLAN 6
Hold PO antihyperglycemic  Continue with HSS   Follow up HbA1c Continue with home dose of levothyroxine   Normal TSH 1.12 Holding home losartan 50 mg, restart if BP continues to trend high  Started Fluids and Pheny for hypotension

## 2018-02-13 NOTE — PROGRESS NOTE ADULT - ASSESSMENT
77 F from home, lives with friend PMH of MS on baclofen, HTN, HLD, CVA with left hemiparesis , bed bound at baseline was brought in by friend for respiratory distress. As per friend, she (herself) had a flu recently and she noticed pt doesn't look right on friday night, called the PCP, recommended to gave cold medication. Pt did not c/o of any pain or SOB but noticed coughing. This morning she was found to be making gurgling noises and secretion/mucus coming out of her mouth.

## 2018-02-13 NOTE — PROGRESS NOTE ADULT - SUBJECTIVE AND OBJECTIVE BOX
INTERVAL HPI/OVERNIGHT EVENTS: ***    PRESSORS: [x] YES [ ] NO  WHICH: Pheny    ANTIBIOTICS: Tamiflu                 DATE STARTED:   ANTIBIOTICS: Zithromax             DATE STARTED:   ANTIBIOTICS: Rocephin              DATE STARTED:     Antimicrobial:  azithromycin  IVPB 500 milliGRAM(s) IV Intermittent every 24 hours  cefTRIAXone   IVPB 1 Gram(s) IV Intermittent every 24 hours  oseltamivir 30 milliGRAM(s) Oral daily    Cardiovascular:  phenylephrine    Infusion 1 MICROgram(s)/kG/Min IV Continuous <Continuous>    Hematalogic:  aspirin  chewable 81 milliGRAM(s) Oral daily  heparin  Injectable 5000 Unit(s) SubCutaneous every 12 hours    Other:  acetaminophen    Suspension 650 milliGRAM(s) Oral every 6 hours PRN  atorvastatin 20 milliGRAM(s) Oral at bedtime  baclofen 10 milliGRAM(s) Oral three times a day  chlorhexidine 4% Liquid 1 Application(s) Topical daily  citalopram 5 milliGRAM(s) Oral at bedtime  dextrose 5% + sodium chloride 0.9%. 1000 milliLiter(s) IV Continuous <Continuous>  donepezil 10 milliGRAM(s) Oral at bedtime  fentaNYL   Infusion 0.5 MICROgram(s)/kG/Hr IV Continuous <Continuous>  insulin lispro (HumaLOG) corrective regimen sliding scale   SubCutaneous every 6 hours  levothyroxine 25 MICROGram(s) Oral daily  methylPREDNISolone sodium succinate Injectable 40 milliGRAM(s) IV Push every 6 hours  pantoprazole  Injectable 40 milliGRAM(s) IV Push daily  raloxifene 60 milliGRAM(s) Oral daily    acetaminophen    Suspension 650 milliGRAM(s) Oral every 6 hours PRN  aspirin  chewable 81 milliGRAM(s) Oral daily  atorvastatin 20 milliGRAM(s) Oral at bedtime  azithromycin  IVPB 500 milliGRAM(s) IV Intermittent every 24 hours  baclofen 10 milliGRAM(s) Oral three times a day  cefTRIAXone   IVPB 1 Gram(s) IV Intermittent every 24 hours  chlorhexidine 4% Liquid 1 Application(s) Topical daily  citalopram 5 milliGRAM(s) Oral at bedtime  dextrose 5% + sodium chloride 0.9%. 1000 milliLiter(s) IV Continuous <Continuous>  donepezil 10 milliGRAM(s) Oral at bedtime  fentaNYL   Infusion 0.5 MICROgram(s)/kG/Hr IV Continuous <Continuous>  heparin  Injectable 5000 Unit(s) SubCutaneous every 12 hours  insulin lispro (HumaLOG) corrective regimen sliding scale   SubCutaneous every 6 hours  levothyroxine 25 MICROGram(s) Oral daily  methylPREDNISolone sodium succinate Injectable 40 milliGRAM(s) IV Push every 6 hours  oseltamivir 30 milliGRAM(s) Oral daily  pantoprazole  Injectable 40 milliGRAM(s) IV Push daily  phenylephrine    Infusion 1 MICROgram(s)/kG/Min IV Continuous <Continuous>  raloxifene 60 milliGRAM(s) Oral daily    Drug Dosing Weight  Height (cm): 160.02 (2018 08:58)  Weight (kg): 63.5 (2018 08:58)  BMI (kg/m2): 24.8 (2018 08:58)  BSA (m2): 1.66 (2018 08:58)    CENTRAL LINE: [ ] YES [x] NO  LOCATION:   DATE INSERTED:    SANDERS: [x] YES [ ] NO    DATE INSERTED:    A-LINE:  [ ] YES [x] NO  LOCATION:   DATE INSERTED:    ICU Vital Signs Last 24 Hrs  T(C): 37.6 (2018 04:30), Max: 38.3 (2018 16:00)  T(F): 99.7 (2018 04:30), Max: 101 (2018 16:00)  HR: 74 (2018 05:21) (70 - 115)  BP: 137/47 (2018 04:30) (50/19 - 204/76)  BP(mean): 71 (2018 04:30) (29 - 112)  ABP: --  ABP(mean): --  RR: 14 (2018 04:30) (14 - 30)  SpO2: 99% (2018 05:21) (88% - 100%)      ABG - ( 2018 04:43 )  pH: 7.32  /  pCO2: 34    /  pO2: 90    / HCO3: 17    / Base Excess: -8.0  /  SaO2: 96                        Mode: AC/ CMV (Assist Control/ Continuous Mandatory Ventilation)  RR (machine): 14  TV (machine): 450  FiO2: 50  PEEP: 5  ITime: 1  MAP: 9  PIP: 24      PHYSICAL EXAM:    GENERAL: Well developed old female, NAD due to sedation  HEENT:  Normocephalic/Atraumatic, reactive light reflex, moist mucous membranes  NECK: Supple, no JVD  RESP: Symmetric movement of the chest, clear to auscultation bilaterally  CVS: Tachycardia, S1 and S2 audible, no murmur, rubs or gallops  GI: Normal active bowel sounds present, abdomen soft, non tender, non distended  EXTREMITIES:  no edema, no clubbing, cyanosis  MSK: 0/5 strength bilateral upper and lower extremities  PSYCH: Sedated  NEURO: alert and oriented x 0 due to sedation    LABS:  CBC Full  -  ( 2018 09:27 )  WBC Count : 10.4 K/uL  Hemoglobin : 11.3 g/dL  Hematocrit : 35.2 %  Platelet Count - Automated : 175 K/uL  Mean Cell Volume : 90.8 fl  Mean Cell Hemoglobin : 29.2 pg  Mean Cell Hemoglobin Concentration : 32.2 gm/dL  Auto Neutrophil # : 8.6 K/uL  Auto Lymphocyte # : 0.7 K/uL  Auto Monocyte # : 1.1 K/uL  Auto Eosinophil # : 0.0 K/uL  Auto Basophil # : 0.1 K/uL  Auto Neutrophil % : 82.4 %  Auto Lymphocyte % : 6.6 %  Auto Monocyte % : 10.3 %  Auto Eosinophil % : 0.0 %  Auto Basophil % : 0.7 %        139  |  106  |  38<H>  ----------------------------<  89  4.5   |  19<L>  |  2.45<H>    Ca    8.1<L>      2018 16:40    TPro  6.4  /  Alb  2.5<L>  /  TBili  0.6  /  DBili  x   /  AST  36  /  ALT  17  /  AlkPhos  63  02-12    PT/INR - ( 2018 09:27 )   PT: 12.6 sec;   INR: 1.15 ratio         PTT - ( 2018 09:27 )  PTT:30.1 sec  Urinalysis Basic - ( 2018 10:29 )    Color: Pink / Appearance: bloody / S.015 / pH: x  Gluc: x / Ketone: Trace  / Bili: Negative / Urobili: Negative   Blood: x / Protein: 100 / Nitrite: Negative   Leuk Esterase: Moderate / RBC: >50 /HPF / WBC 3-5 /HPF   Sq Epi: x / Non Sq Epi: Occasional /HPF / Bacteria: Trace /HPF          RADIOLOGY & ADDITIONAL STUDIES REVIEWED:   Ct Head: No acute intracranial hemorrhage, mass effect or midline shift.  Age-indeterminate left inferior cerebellar infarct. Old left superior   cerebellar infarct.  Patchy white matter hypodensities, consistent with reported history of   multiple sclerosis, and probable superimposed small vessel ischemic   diseas    [ ]GOALS OF CARE DISCUSSION WITH PATIENT/FAMILY/PROXY: DNR, but ok for intubation    CRITICAL CARE TIME SPENT: 35 minutes INTERVAL HPI/OVERNIGHT EVENTS: Adequate urine output with Net Positive of 2200 ml in 24 hours.    PRESSORS: [x] YES [ ] NO  WHICH: Pheny    ANTIBIOTICS: Tamiflu                 DATE STARTED:   ANTIBIOTICS: Zithromax             DATE STARTED:   ANTIBIOTICS: Rocephin              DATE STARTED:     Antimicrobial:  azithromycin  IVPB 500 milliGRAM(s) IV Intermittent every 24 hours  cefTRIAXone   IVPB 1 Gram(s) IV Intermittent every 24 hours  oseltamivir 30 milliGRAM(s) Oral daily    Cardiovascular:  phenylephrine    Infusion 1 MICROgram(s)/kG/Min IV Continuous <Continuous>    Hematalogic:  aspirin  chewable 81 milliGRAM(s) Oral daily  heparin  Injectable 5000 Unit(s) SubCutaneous every 12 hours    Other:  acetaminophen    Suspension 650 milliGRAM(s) Oral every 6 hours PRN  atorvastatin 20 milliGRAM(s) Oral at bedtime  baclofen 10 milliGRAM(s) Oral three times a day  chlorhexidine 4% Liquid 1 Application(s) Topical daily  citalopram 5 milliGRAM(s) Oral at bedtime  dextrose 5% + sodium chloride 0.9%. 1000 milliLiter(s) IV Continuous <Continuous>  donepezil 10 milliGRAM(s) Oral at bedtime  fentaNYL   Infusion 0.5 MICROgram(s)/kG/Hr IV Continuous <Continuous>  insulin lispro (HumaLOG) corrective regimen sliding scale   SubCutaneous every 6 hours  levothyroxine 25 MICROGram(s) Oral daily  methylPREDNISolone sodium succinate Injectable 40 milliGRAM(s) IV Push every 6 hours  pantoprazole  Injectable 40 milliGRAM(s) IV Push daily  raloxifene 60 milliGRAM(s) Oral daily    acetaminophen    Suspension 650 milliGRAM(s) Oral every 6 hours PRN  aspirin  chewable 81 milliGRAM(s) Oral daily  atorvastatin 20 milliGRAM(s) Oral at bedtime  azithromycin  IVPB 500 milliGRAM(s) IV Intermittent every 24 hours  baclofen 10 milliGRAM(s) Oral three times a day  cefTRIAXone   IVPB 1 Gram(s) IV Intermittent every 24 hours  chlorhexidine 4% Liquid 1 Application(s) Topical daily  citalopram 5 milliGRAM(s) Oral at bedtime  dextrose 5% + sodium chloride 0.9%. 1000 milliLiter(s) IV Continuous <Continuous>  donepezil 10 milliGRAM(s) Oral at bedtime  fentaNYL   Infusion 0.5 MICROgram(s)/kG/Hr IV Continuous <Continuous>  heparin  Injectable 5000 Unit(s) SubCutaneous every 12 hours  insulin lispro (HumaLOG) corrective regimen sliding scale   SubCutaneous every 6 hours  levothyroxine 25 MICROGram(s) Oral daily  methylPREDNISolone sodium succinate Injectable 40 milliGRAM(s) IV Push every 6 hours  oseltamivir 30 milliGRAM(s) Oral daily  pantoprazole  Injectable 40 milliGRAM(s) IV Push daily  phenylephrine    Infusion 1 MICROgram(s)/kG/Min IV Continuous <Continuous>  raloxifene 60 milliGRAM(s) Oral daily    Drug Dosing Weight  Height (cm): 160.02 (2018 08:58)  Weight (kg): 63.5 (2018 08:58)  BMI (kg/m2): 24.8 (2018 08:58)  BSA (m2): 1.66 (2018 08:58)    CENTRAL LINE: [ ] YES [x] NO  LOCATION:   DATE INSERTED:    SANDERS: [x] YES [ ] NO    DATE INSERTED:    A-LINE:  [ ] YES [x] NO  LOCATION:   DATE INSERTED:    ICU Vital Signs Last 24 Hrs  T(C): 37.6 (2018 04:30), Max: 38.3 (2018 16:00)  T(F): 99.7 (2018 04:30), Max: 101 (2018 16:00)  HR: 74 (2018 05:21) (70 - 115)  BP: 137/47 (2018 04:30) (50/19 - 204/76)  BP(mean): 71 (2018 04:30) (29 - 112)  ABP: --  ABP(mean): --  RR: 14 (2018 04:30) (14 - 30)  SpO2: 99% (2018 05:21) (88% - 100%)      ABG - ( 2018 04:43 )  pH: 7.32  /  pCO2: 34    /  pO2: 90    / HCO3: 17    / Base Excess: -8.0  /  SaO2: 96                        Mode: AC/ CMV (Assist Control/ Continuous Mandatory Ventilation)  RR (machine): 14  TV (machine): 450  FiO2: 50  PEEP: 5  ITime: 1  MAP: 9  PIP: 24      PHYSICAL EXAM:    GENERAL: Well developed old female, NAD due to sedation  HEENT:  Normocephalic/Atraumatic, reactive light reflex, moist mucous membranes  NECK: Supple, no JVD  RESP: Symmetric movement of the chest, clear to auscultation bilaterally  CVS: Tachycardia, S1 and S2 audible, no murmur, rubs or gallops  GI: Normal active bowel sounds present, abdomen soft, non tender, non distended  EXTREMITIES:  no edema, no clubbing, cyanosis  MSK: 0/5 strength bilateral upper and lower extremities  PSYCH: Sedated  NEURO: alert and oriented x 0 due to sedation    LABS:  CBC Full  -  ( 2018 09:27 )  WBC Count : 10.4 K/uL  Hemoglobin : 11.3 g/dL  Hematocrit : 35.2 %  Platelet Count - Automated : 175 K/uL  Mean Cell Volume : 90.8 fl  Mean Cell Hemoglobin : 29.2 pg  Mean Cell Hemoglobin Concentration : 32.2 gm/dL  Auto Neutrophil # : 8.6 K/uL  Auto Lymphocyte # : 0.7 K/uL  Auto Monocyte # : 1.1 K/uL  Auto Eosinophil # : 0.0 K/uL  Auto Basophil # : 0.1 K/uL  Auto Neutrophil % : 82.4 %  Auto Lymphocyte % : 6.6 %  Auto Monocyte % : 10.3 %  Auto Eosinophil % : 0.0 %  Auto Basophil % : 0.7 %        139  |  106  |  38<H>  ----------------------------<  89  4.5   |  19<L>  |  2.45<H>    Ca    8.1<L>      2018 16:40    TPro  6.4  /  Alb  2.5<L>  /  TBili  0.6  /  DBili  x   /  AST  36  /  ALT  17  /  AlkPhos  63  02-12    PT/INR - ( 2018 09:27 )   PT: 12.6 sec;   INR: 1.15 ratio         PTT - ( 2018 09:27 )  PTT:30.1 sec  Urinalysis Basic - ( 2018 10:29 )    Color: Pink / Appearance: bloody / S.015 / pH: x  Gluc: x / Ketone: Trace  / Bili: Negative / Urobili: Negative   Blood: x / Protein: 100 / Nitrite: Negative   Leuk Esterase: Moderate / RBC: >50 /HPF / WBC 3-5 /HPF   Sq Epi: x / Non Sq Epi: Occasional /HPF / Bacteria: Trace /HPF          RADIOLOGY & ADDITIONAL STUDIES REVIEWED:   Ct Head: No acute intracranial hemorrhage, mass effect or midline shift.  Age-indeterminate left inferior cerebellar infarct. Old left superior   cerebellar infarct.  Patchy white matter hypodensities, consistent with reported history of   multiple sclerosis, and probable superimposed small vessel ischemic   diseas    [ ]GOALS OF CARE DISCUSSION WITH PATIENT/FAMILY/PROXY: DNR, but ok for intubation    CRITICAL CARE TIME SPENT: 35 minutes

## 2018-02-14 LAB
ANION GAP SERPL CALC-SCNC: 12 MMOL/L — SIGNIFICANT CHANGE UP (ref 5–17)
BASE EXCESS BLDA CALC-SCNC: -5 MMOL/L — LOW (ref -2–2)
BLOOD GAS COMMENTS ARTERIAL: SIGNIFICANT CHANGE UP
BUN SERPL-MCNC: 33 MG/DL — HIGH (ref 7–18)
CALCIUM SERPL-MCNC: 8.4 MG/DL — SIGNIFICANT CHANGE UP (ref 8.4–10.5)
CHLORIDE SERPL-SCNC: 111 MMOL/L — HIGH (ref 96–108)
CO2 SERPL-SCNC: 17 MMOL/L — LOW (ref 22–31)
CREAT SERPL-MCNC: 1.74 MG/DL — HIGH (ref 0.5–1.3)
GLUCOSE BLDC GLUCOMTR-MCNC: 214 MG/DL — HIGH (ref 70–99)
GLUCOSE BLDC GLUCOMTR-MCNC: 217 MG/DL — HIGH (ref 70–99)
GLUCOSE BLDC GLUCOMTR-MCNC: 219 MG/DL — HIGH (ref 70–99)
GLUCOSE BLDC GLUCOMTR-MCNC: 223 MG/DL — HIGH (ref 70–99)
GLUCOSE BLDC GLUCOMTR-MCNC: 246 MG/DL — HIGH (ref 70–99)
GLUCOSE SERPL-MCNC: 210 MG/DL — HIGH (ref 70–99)
HBA1C BLD-MCNC: 5.7 % — HIGH (ref 4–5.6)
HCO3 BLDA-SCNC: 19 MMOL/L — LOW (ref 23–27)
HCT VFR BLD CALC: 32.5 % — LOW (ref 34.5–45)
HGB BLD-MCNC: 9.9 G/DL — LOW (ref 11.5–15.5)
HOROWITZ INDEX BLDA+IHG-RTO: 50 — SIGNIFICANT CHANGE UP
MAGNESIUM SERPL-MCNC: 2 MG/DL — SIGNIFICANT CHANGE UP (ref 1.6–2.6)
MCHC RBC-ENTMCNC: 27.3 PG — SIGNIFICANT CHANGE UP (ref 27–34)
MCHC RBC-ENTMCNC: 30.6 GM/DL — LOW (ref 32–36)
MCV RBC AUTO: 89.4 FL — SIGNIFICANT CHANGE UP (ref 80–100)
PCO2 BLDA: 31 MMHG — LOW (ref 32–46)
PH BLDA: 7.4 — SIGNIFICANT CHANGE UP (ref 7.35–7.45)
PHOSPHATE SERPL-MCNC: 2.8 MG/DL — SIGNIFICANT CHANGE UP (ref 2.5–4.5)
PLATELET # BLD AUTO: 154 K/UL — SIGNIFICANT CHANGE UP (ref 150–400)
PO2 BLDA: 90 MMHG — SIGNIFICANT CHANGE UP (ref 74–108)
POTASSIUM SERPL-MCNC: 4.5 MMOL/L — SIGNIFICANT CHANGE UP (ref 3.5–5.3)
POTASSIUM SERPL-SCNC: 4.5 MMOL/L — SIGNIFICANT CHANGE UP (ref 3.5–5.3)
RBC # BLD: 3.64 M/UL — LOW (ref 3.8–5.2)
RBC # FLD: 13 % — SIGNIFICANT CHANGE UP (ref 10.3–14.5)
SAO2 % BLDA: 97 % — HIGH (ref 92–96)
SODIUM SERPL-SCNC: 140 MMOL/L — SIGNIFICANT CHANGE UP (ref 135–145)
WBC # BLD: 10.4 K/UL — SIGNIFICANT CHANGE UP (ref 3.8–10.5)
WBC # FLD AUTO: 10.4 K/UL — SIGNIFICANT CHANGE UP (ref 3.8–10.5)

## 2018-02-14 PROCEDURE — 71045 X-RAY EXAM CHEST 1 VIEW: CPT | Mod: 26

## 2018-02-14 RX ORDER — INSULIN GLARGINE 100 [IU]/ML
10 INJECTION, SOLUTION SUBCUTANEOUS AT BEDTIME
Qty: 0 | Refills: 0 | Status: DISCONTINUED | OUTPATIENT
Start: 2018-02-14 | End: 2018-02-15

## 2018-02-14 RX ORDER — DEXAMETHASONE 0.5 MG/5ML
4 ELIXIR ORAL
Qty: 0 | Refills: 0 | Status: COMPLETED | OUTPATIENT
Start: 2018-02-14 | End: 2018-02-15

## 2018-02-14 RX ADMIN — CEFTRIAXONE 100 GRAM(S): 500 INJECTION, POWDER, FOR SOLUTION INTRAMUSCULAR; INTRAVENOUS at 18:17

## 2018-02-14 RX ADMIN — INSULIN GLARGINE 10 UNIT(S): 100 INJECTION, SOLUTION SUBCUTANEOUS at 23:08

## 2018-02-14 RX ADMIN — ATORVASTATIN CALCIUM 20 MILLIGRAM(S): 80 TABLET, FILM COATED ORAL at 23:09

## 2018-02-14 RX ADMIN — Medication 6 MILLIGRAM(S): at 01:31

## 2018-02-14 RX ADMIN — RALOXIFENE HYDROCHLORIDE 60 MILLIGRAM(S): 60 TABLET, COATED ORAL at 01:39

## 2018-02-14 RX ADMIN — PANTOPRAZOLE SODIUM 40 MILLIGRAM(S): 20 TABLET, DELAYED RELEASE ORAL at 01:38

## 2018-02-14 RX ADMIN — Medication 81 MILLIGRAM(S): at 14:04

## 2018-02-14 RX ADMIN — Medication 4 MILLIGRAM(S): at 14:03

## 2018-02-14 RX ADMIN — CHLORHEXIDINE GLUCONATE 1 APPLICATION(S): 213 SOLUTION TOPICAL at 14:05

## 2018-02-14 RX ADMIN — Medication 10 MILLIGRAM(S): at 23:09

## 2018-02-14 RX ADMIN — HEPARIN SODIUM 5000 UNIT(S): 5000 INJECTION INTRAVENOUS; SUBCUTANEOUS at 18:16

## 2018-02-14 RX ADMIN — Medication 4 MILLIGRAM(S): at 23:10

## 2018-02-14 RX ADMIN — CITALOPRAM 5 MILLIGRAM(S): 10 TABLET, FILM COATED ORAL at 23:09

## 2018-02-14 RX ADMIN — Medication 2: at 14:20

## 2018-02-14 RX ADMIN — HEPARIN SODIUM 5000 UNIT(S): 5000 INJECTION INTRAVENOUS; SUBCUTANEOUS at 05:45

## 2018-02-14 RX ADMIN — Medication 2: at 05:48

## 2018-02-14 RX ADMIN — AZITHROMYCIN 250 MILLIGRAM(S): 500 TABLET, FILM COATED ORAL at 05:39

## 2018-02-14 RX ADMIN — RALOXIFENE HYDROCHLORIDE 60 MILLIGRAM(S): 60 TABLET, COATED ORAL at 18:12

## 2018-02-14 RX ADMIN — Medication 30 MILLIGRAM(S): at 14:04

## 2018-02-14 RX ADMIN — Medication 10 MILLIGRAM(S): at 14:06

## 2018-02-14 RX ADMIN — Medication 25 MICROGRAM(S): at 05:45

## 2018-02-14 RX ADMIN — Medication 2: at 01:32

## 2018-02-14 RX ADMIN — PANTOPRAZOLE SODIUM 40 MILLIGRAM(S): 20 TABLET, DELAYED RELEASE ORAL at 14:09

## 2018-02-14 RX ADMIN — DEXMEDETOMIDINE HYDROCHLORIDE IN 0.9% SODIUM CHLORIDE 3.17 MICROGRAM(S)/KG/HR: 4 INJECTION INTRAVENOUS at 18:16

## 2018-02-14 RX ADMIN — Medication 2: at 18:32

## 2018-02-14 RX ADMIN — DONEPEZIL HYDROCHLORIDE 10 MILLIGRAM(S): 10 TABLET, FILM COATED ORAL at 23:09

## 2018-02-14 RX ADMIN — Medication 6 MILLIGRAM(S): at 05:42

## 2018-02-14 RX ADMIN — Medication 4 MILLIGRAM(S): at 18:16

## 2018-02-14 RX ADMIN — DEXMEDETOMIDINE HYDROCHLORIDE IN 0.9% SODIUM CHLORIDE 3.17 MICROGRAM(S)/KG/HR: 4 INJECTION INTRAVENOUS at 22:12

## 2018-02-14 RX ADMIN — Medication 2: at 23:20

## 2018-02-14 RX ADMIN — Medication 10 MILLIGRAM(S): at 05:45

## 2018-02-14 NOTE — PROGRESS NOTE ADULT - PROBLEM SELECTOR PLAN 1
Secondary to RVP: Influenza B  Productive secretions with Clear CXR  Suspicion of Acute Bronchitis  Elevated Procalcitonin  Leukocytosis 10-->14  CT head: Old age related infarcts  Started Tamiflu (day 3) in addition to Zithromax  C/w Mechanical Ventilation as patient has difficult airway  Started Decadron and Consulted Dr Wiseman ENT Secondary to RVP: Influenza B  Productive secretions with Clear CXR  Suspicion of Acute Bronchitis  Elevated Procalcitonin  Leukocytosis 10-->14  CT head: Old age related infarcts  Started Tamiflu (day 3) in addition to Zithromax  C/w Mechanical Ventilation as patient has difficult airway  C/w Decadron and Consulted Dr Wiseman ENT Secondary to RVP: Influenza B  Suspicion of Acute Bronchitis  Resolving Leukocytosis  Started Tamiflu (day 3) in addition to Zithromax  C/w Mechanical Ventilation as patient has difficult airway  C/w Decadron and Consulted Dr Wiseman ENT

## 2018-02-14 NOTE — PROGRESS NOTE ADULT - PROBLEM SELECTOR PLAN 9
Patient has hx of CVA with left hemiparesis but only takes lipitor 20 mg (not on aspirin)   C/w ASA, Statin

## 2018-02-14 NOTE — PROGRESS NOTE ADULT - PROBLEM SELECTOR PLAN 3
EKG: NSR  Mildly elevated trop likely due to demand  No Chest pain on admission  F/u Echo  Dr De León on board

## 2018-02-14 NOTE — PROGRESS NOTE ADULT - SUBJECTIVE AND OBJECTIVE BOX
Patient seen and examined.   Time of visit:    MEDICATIONS  (STANDING):  aspirin  chewable 81 milliGRAM(s) Oral daily  atorvastatin 20 milliGRAM(s) Oral at bedtime  azithromycin  IVPB 500 milliGRAM(s) IV Intermittent every 24 hours  baclofen 10 milliGRAM(s) Oral three times a day  cefTRIAXone   IVPB 1 Gram(s) IV Intermittent every 24 hours  chlorhexidine 4% Liquid 1 Application(s) Topical daily  citalopram 5 milliGRAM(s) Oral at bedtime  dexamethasone  Injectable 4 milliGRAM(s) IV Push four times a day  dexmedetomidine Infusion 0.2 MICROgram(s)/kG/Hr (3.175 mL/Hr) IV Continuous <Continuous>  donepezil 10 milliGRAM(s) Oral at bedtime  heparin  Injectable 5000 Unit(s) SubCutaneous every 12 hours  insulin glargine Injectable (LANTUS) 10 Unit(s) SubCutaneous at bedtime  insulin lispro (HumaLOG) corrective regimen sliding scale   SubCutaneous every 6 hours  levothyroxine 25 MICROGram(s) Oral daily  oseltamivir 30 milliGRAM(s) Oral daily  pantoprazole  Injectable 40 milliGRAM(s) IV Push daily  raloxifene 60 milliGRAM(s) Oral daily      MEDICATIONS  (PRN):  acetaminophen    Suspension 650 milliGRAM(s) Oral every 6 hours PRN For Temp greater than 38 C (100.4 F)   Medications up to date at time of exam.      PHYSICAL EXAMINATION:  Patient has no new complaints.  GENERAL: The patient is a well-developed, well-nourished, in no apparent distress.     Vital Signs Last 24 Hrs  T(C): 37.2 (14 Feb 2018 19:53), Max: 37.2 (14 Feb 2018 19:53)  T(F): 98.9 (14 Feb 2018 19:53), Max: 98.9 (14 Feb 2018 19:53)  HR: 64 (14 Feb 2018 20:00) (60 - 107)  BP: 154/59 (14 Feb 2018 20:00) (107/56 - 159/74)  BP(mean): 87 (14 Feb 2018 20:00) (69 - 97)  RR: 14 (14 Feb 2018 20:00) (14 - 28)  SpO2: 96% (14 Feb 2018 20:00) (94% - 100%)  Mode: AC/ CMV (Assist Control/ Continuous Mandatory Ventilation)  RR (machine): 14  TV (machine): 450  FiO2: 50  PEEP: 5  ITime: 1  MAP: 9.8  PIP: 30   (if applicable)      HEENT: Head is normocephalic and atraumatic. +ETT    NECK: Supple, no palpable adenopathy.    LUNGS: Clear to auscultation, no wheezing, rales, or rhonchi.    HEART: Regular rate and rhythm without murmur.    ABDOMEN: Soft, nontender, and nondistended.  No hepatosplenomegaly is noted.    EXTREMITIES: Without any cyanosis, clubbing, rash, lesions or edema.    NEUROLOGIC: Awake, alert.    SKIN: Warm, dry, good turgor.      LABS:                        9.9    10.4  )-----------( 154      ( 14 Feb 2018 06:20 )             32.5     02-14    140  |  111<H>  |  33<H>  ----------------------------<  210<H>  4.5   |  17<L>  |  1.74<H>    Ca    8.4      14 Feb 2018 06:19  Phos  2.8     02-14  Mg     2.0     02-14    TPro  5.6<L>  /  Alb  2.0<L>  /  TBili  0.3  /  DBili  x   /  AST  23  /  ALT  21  /  AlkPhos  75  02-13        ABG - ( 14 Feb 2018 04:37 )  pH: 7.40  /  pCO2: 31    /  pO2: 90    / HCO3: 19    / Base Excess: -5.0  /  SaO2: 97            Serum Pro-Brain Natriuretic Peptide: 2575 pg/mL (02-12-18 @ 09:27)      Procalcitonin, Serum: 6.11 ng/mL (02-13-18 @ 00:55)      MICROBIOLOGY: (if applicable)    RADIOLOGY & ADDITIONAL STUDIES:  EKG:   CXR:  ECHO:  < from: Transthoracic Echocardiogram (02.14.18 @ 08:57) >    Patient name: GAVIN WALKER  YOB: 1940   Age: 77 (F)   MR#: [id number]  Study Date: 2/14/2018  Location: [facility]ographer: Jarek Casillas RDCS  Study quality: Technically difficult  Referring Physician:  KAYLA HAN MD  BloodPressure: 121/72 mmHg  Height: 160 cm  Weight: 63 kg  BSA: 1.7 m2  ------------------------------------------------------------------------    PROCEDURE: Transthoracic echocardiogram with 2-D, M-Mode  and complete spectral and color flow Doppler.  INDICATION: Abnormal electrocardiogram [ECG] [EKG] (R94.31)  HISTORY:  ------------------------------------------------------------------------  DIMENSIONS:  Dimensions:     Normal Values:  LA:     3.6 cm    2.0 - 4.0 cm  Ao:     3.0 cm    2.0 - 3.8 cm  SEPTUM: 1.0 cm    0.6 - 1.2 cm  PWT:    1.0 cm    0.6 - 1.1 cm  LVIDd:  4.0 cm    3.0 - 5.6 cm  LVIDs:  2.9 cm    1.8 - 4.0 cm      Derived Variables:  LVMI: 77 g/m2  RWT: 0.50  Ejection Fraction Visual Estimate: >55 %    ------------------------------------------------------------------------  OBSERVATIONS:  Mitral Valve: Mitral annular calcification, and calcified  mitral leaflets with normal diastolic opening. Mild mitral  regurgitation.  Aortic Root: Normal aortic root.  Aortic Valve: Aortic valve leaflet morphology not well  visualized, opens normally.  Left Atrium: LA volume index = 14 cc/m2.  Left Ventricle: Endocardium not well visualized; grossly  normal left ventricular systolic function. Segmental wall  motion could not be assessed. Normal left ventricular  internal dimensions and wall thicknesses.  Right Heart: Normal right atrium. Normal right ventricular  size and function. There is mild tricuspid regurgitation.  There is trace pulmonic regurgitation.  Pericardium/PleuraNormal pericardium with no pericardial  effusion.  Hemodynamic: Incomplete tricuspid regurgitation jet  precludes accurate assessment of pulmonary artery systolic  pressure.  ------------------------------------------------------------------------  CONCLUSIONS:  1. Mitral annular calcification, and calcified mitral  leaflets with normal diastolic opening. Mild mitral  regurgitation.  2. Normal left ventricular internal dimensions and wall  thicknesses.  3. Endocardium not well visualized; grossly normal left  ventricular systolic function. Segmental wall motion could  not be assessed.  4. Normal right ventricular size and function.    ------------------------------------------------------------------------  Confirmed on  2/14/2018 - 13:37:45 by Cruz Abreu MD  ------------------------------------------------------------------------    < end of copied text >    IMPRESSION: 77y Female PAST MEDICAL & SURGICAL HISTORY:  MS (multiple sclerosis)  CVA (cerebral vascular accident)  Diabetes mellitus  Hypertension  Hypothyroidism         Patient presents with SOB, respiratory failure due to flu, increased troponins due to demand ischemia.   Patient with intermittent tachycardia, in setting of infection, improving  2D echo shows grossly normal EF.  BP stable, con't w/ supportive care.

## 2018-02-14 NOTE — PROGRESS NOTE ADULT - PROBLEM SELECTOR PLAN 5
C/w home baclofen, Citalopram and donepezil   Pt needs MRI gadolinium scan when stable  Dr Yan consulted

## 2018-02-14 NOTE — PROGRESS NOTE ADULT - SUBJECTIVE AND OBJECTIVE BOX
INTERVAL HPI/OVERNIGHT EVENTS: ***    PRESSORS: [ ] YES [x] NO    ANTIBIOTICS: Tamiflu                 DATE STARTED:   ANTIBIOTICS: Zithromax             DATE STARTED:   ANTIBIOTICS: Rocephin              DATE STARTED:     Antimicrobial:  azithromycin  IVPB 500 milliGRAM(s) IV Intermittent every 24 hours  cefTRIAXone   IVPB 1 Gram(s) IV Intermittent every 24 hours  oseltamivir 30 milliGRAM(s) Oral daily    Hematalogic:  aspirin  chewable 81 milliGRAM(s) Oral daily  heparin  Injectable 5000 Unit(s) SubCutaneous every 12 hours    Other:  acetaminophen    Suspension 650 milliGRAM(s) Oral every 6 hours PRN  atorvastatin 20 milliGRAM(s) Oral at bedtime  baclofen 10 milliGRAM(s) Oral three times a day  chlorhexidine 4% Liquid 1 Application(s) Topical daily  citalopram 5 milliGRAM(s) Oral at bedtime  dexmedetomidine Infusion 0.2 MICROgram(s)/kG/Hr IV Continuous <Continuous>  donepezil 10 milliGRAM(s) Oral at bedtime  insulin lispro (HumaLOG) corrective regimen sliding scale   SubCutaneous every 6 hours  levothyroxine 25 MICROGram(s) Oral daily  pantoprazole  Injectable 40 milliGRAM(s) IV Push daily  raloxifene 60 milliGRAM(s) Oral daily    acetaminophen    Suspension 650 milliGRAM(s) Oral every 6 hours PRN  aspirin  chewable 81 milliGRAM(s) Oral daily  atorvastatin 20 milliGRAM(s) Oral at bedtime  azithromycin  IVPB 500 milliGRAM(s) IV Intermittent every 24 hours  baclofen 10 milliGRAM(s) Oral three times a day  cefTRIAXone   IVPB 1 Gram(s) IV Intermittent every 24 hours  chlorhexidine 4% Liquid 1 Application(s) Topical daily  citalopram 5 milliGRAM(s) Oral at bedtime  dexmedetomidine Infusion 0.2 MICROgram(s)/kG/Hr IV Continuous <Continuous>  donepezil 10 milliGRAM(s) Oral at bedtime  heparin  Injectable 5000 Unit(s) SubCutaneous every 12 hours  insulin lispro (HumaLOG) corrective regimen sliding scale   SubCutaneous every 6 hours  levothyroxine 25 MICROGram(s) Oral daily  oseltamivir 30 milliGRAM(s) Oral daily  pantoprazole  Injectable 40 milliGRAM(s) IV Push daily  raloxifene 60 milliGRAM(s) Oral daily    Drug Dosing Weight  Height (cm): 160.02 (2018 08:58)  Weight (kg): 63.5 (2018 08:58)  BMI (kg/m2): 24.8 (2018 08:58)  BSA (m2): 1.66 (2018 08:58)    CENTRAL LINE: [ ] YES [x] NO  LOCATION:   DATE INSERTED:    SANDERS: [x] YES [ ] NO    DATE INSERTED:    A-LINE:  [ ] YES [x] NO  LOCATION:   DATE INSERTED:    ICU Vital Signs Last 24 Hrs  T(C): 35.6 (2018 05:00), Max: 36.6 (2018 20:11)  T(F): 96.1 (2018 05:00), Max: 97.8 (2018 20:11)  HR: 73 (2018 07:00) (60 - 98)  BP: 121/51 (2018 07:00) (99/70 - 143/64)  BP(mean): 72 (2018 07:00) (59 - 98)  ABP: --  ABP(mean): --  RR: 16 (2018 07:00) (8 - 24)  SpO2: 100% (2018 07:00) (94% - 100%)      ABG - ( 2018 04:37 )  pH: 7.40  /  pCO2: 31    /  pO2: 90    / HCO3: 19    / Base Excess: -5.0  /  SaO2: 97                    02-13 @ 07:01  -  02-14 @ 07:00  --------------------------------------------------------  IN: 1945.8 mL / OUT: 1065 mL / NET: 880.8 mL        Mode: AC/ CMV (Assist Control/ Continuous Mandatory Ventilation)  RR (machine): 14  TV (machine): 450  FiO2: 50  PEEP: 5  ITime: 1  MAP: 11  PIP: 29      PHYSICAL EXAM:    GENERAL: Well developed old female, NAD due to sedation  HEENT:  Normocephalic/Atraumatic, reactive light reflex, moist mucous membranes  NECK: Supple, no JVD  RESP: Symmetric movement of the chest, clear to auscultation bilaterally  CVS: Tachycardia, S1 and S2 audible, no murmur, rubs or gallops  GI: Normal active bowel sounds present, abdomen soft, non tender, non distended  EXTREMITIES:  no edema, no clubbing, cyanosis  MSK: 0/5 strength bilateral upper and lower extremities  PSYCH: Sedated  NEURO: alert and oriented x 0 due to sedation    LABS:  CBC Full  -  ( 2018 06:25 )  WBC Count : 14.3 K/uL  Hemoglobin : 10.3 g/dL  Hematocrit : 31.7 %  Platelet Count - Automated : 181 K/uL  Mean Cell Volume : 90.0 fl  Mean Cell Hemoglobin : 29.1 pg  Mean Cell Hemoglobin Concentration : 32.3 gm/dL  Auto Neutrophil # : 13.3 K/uL  Auto Lymphocyte # : 0.4 K/uL  Auto Monocyte # : 0.4 K/uL  Auto Eosinophil # : 0.0 K/uL  Auto Basophil # : 0.1 K/uL  Auto Neutrophil % : 93.4 %  Auto Lymphocyte % : 3.1 %  Auto Monocyte % : 3.0 %  Auto Eosinophil % : 0.0 %  Auto Basophil % : 0.5 %    02-14    140  |  111<H>  |  33<H>  ----------------------------<  210<H>  4.5   |  17<L>  |  1.74<H>    Ca    8.4      2018 06:19  Phos  2.8     02-14  Mg     2.0     02-14    TPro  5.6<L>  /  Alb  2.0<L>  /  TBili  0.3  /  DBili  x   /  AST  23  /  ALT  21  /  AlkPhos  75  02-13    PT/INR - ( 2018 09:27 )   PT: 12.6 sec;   INR: 1.15 ratio         PTT - ( 2018 09:27 )  PTT:30.1 sec  Urinalysis Basic - ( 2018 10:29 )    Color: Pink / Appearance: bloody / S.015 / pH: x  Gluc: x / Ketone: Trace  / Bili: Negative / Urobili: Negative   Blood: x / Protein: 100 / Nitrite: Negative   Leuk Esterase: Moderate / RBC: >50 /HPF / WBC 3-5 /HPF   Sq Epi: x / Non Sq Epi: Occasional /HPF / Bacteria: Trace /HPF      Culture Results:   Culture grew 3 or more types of organisms which indicate  collection contamination; consider recollection only if clinically  indicated. ( @ 13:40)  Culture Results:   No growth to date. ( @ 13:37)  Culture Results:   No growth to date. ( @ 13:37)      RADIOLOGY & ADDITIONAL STUDIES REVIEWED:   CXR: B/L Lower lobe effusions     [ ]GOALS OF CARE DISCUSSION WITH PATIENT/FAMILY/PROXY: DNR, but ok for intubation    CRITICAL CARE TIME SPENT: 35 minutes INTERVAL HPI/OVERNIGHT EVENTS: No overnight events    PRESSORS: [ ] YES [x] NO    ANTIBIOTICS: Tamiflu                 DATE STARTED:   ANTIBIOTICS: Zithromax             DATE STARTED:   ANTIBIOTICS: Rocephin              DATE STARTED:     Antimicrobial:  azithromycin  IVPB 500 milliGRAM(s) IV Intermittent every 24 hours  cefTRIAXone   IVPB 1 Gram(s) IV Intermittent every 24 hours  oseltamivir 30 milliGRAM(s) Oral daily    Hematalogic:  aspirin  chewable 81 milliGRAM(s) Oral daily  heparin  Injectable 5000 Unit(s) SubCutaneous every 12 hours    Other:  acetaminophen    Suspension 650 milliGRAM(s) Oral every 6 hours PRN  atorvastatin 20 milliGRAM(s) Oral at bedtime  baclofen 10 milliGRAM(s) Oral three times a day  chlorhexidine 4% Liquid 1 Application(s) Topical daily  citalopram 5 milliGRAM(s) Oral at bedtime  dexmedetomidine Infusion 0.2 MICROgram(s)/kG/Hr IV Continuous <Continuous>  donepezil 10 milliGRAM(s) Oral at bedtime  insulin lispro (HumaLOG) corrective regimen sliding scale   SubCutaneous every 6 hours  levothyroxine 25 MICROGram(s) Oral daily  pantoprazole  Injectable 40 milliGRAM(s) IV Push daily  raloxifene 60 milliGRAM(s) Oral daily    acetaminophen    Suspension 650 milliGRAM(s) Oral every 6 hours PRN  aspirin  chewable 81 milliGRAM(s) Oral daily  atorvastatin 20 milliGRAM(s) Oral at bedtime  azithromycin  IVPB 500 milliGRAM(s) IV Intermittent every 24 hours  baclofen 10 milliGRAM(s) Oral three times a day  cefTRIAXone   IVPB 1 Gram(s) IV Intermittent every 24 hours  chlorhexidine 4% Liquid 1 Application(s) Topical daily  citalopram 5 milliGRAM(s) Oral at bedtime  dexmedetomidine Infusion 0.2 MICROgram(s)/kG/Hr IV Continuous <Continuous>  donepezil 10 milliGRAM(s) Oral at bedtime  heparin  Injectable 5000 Unit(s) SubCutaneous every 12 hours  insulin lispro (HumaLOG) corrective regimen sliding scale   SubCutaneous every 6 hours  levothyroxine 25 MICROGram(s) Oral daily  oseltamivir 30 milliGRAM(s) Oral daily  pantoprazole  Injectable 40 milliGRAM(s) IV Push daily  raloxifene 60 milliGRAM(s) Oral daily    Drug Dosing Weight  Height (cm): 160.02 (2018 08:58)  Weight (kg): 63.5 (2018 08:58)  BMI (kg/m2): 24.8 (2018 08:58)  BSA (m2): 1.66 (2018 08:58)    CENTRAL LINE: [ ] YES [x] NO  LOCATION:   DATE INSERTED:    SANDERS: [x] YES [ ] NO    DATE INSERTED:    A-LINE:  [ ] YES [x] NO  LOCATION:   DATE INSERTED:    ICU Vital Signs Last 24 Hrs  T(C): 35.6 (2018 05:00), Max: 36.6 (2018 20:11)  T(F): 96.1 (2018 05:00), Max: 97.8 (2018 20:11)  HR: 73 (2018 07:00) (60 - 98)  BP: 121/51 (2018 07:00) (99/70 - 143/64)  BP(mean): 72 (2018 07:00) (59 - 98)  ABP: --  ABP(mean): --  RR: 16 (2018 07:00) (8 - 24)  SpO2: 100% (2018 07:00) (94% - 100%)      ABG - ( 2018 04:37 )  pH: 7.40  /  pCO2: 31    /  pO2: 90    / HCO3: 19    / Base Excess: -5.0  /  SaO2: 97                    02-13 @ 07:01  -  02-14 @ 07:00  --------------------------------------------------------  IN: 1945.8 mL / OUT: 1065 mL / NET: 880.8 mL        Mode: AC/ CMV (Assist Control/ Continuous Mandatory Ventilation)  RR (machine): 14  TV (machine): 450  FiO2: 50  PEEP: 5  ITime: 1  MAP: 11  PIP: 29      PHYSICAL EXAM:    GENERAL: Well developed old female, NAD due to sedation  HEENT:  Normocephalic/Atraumatic, reactive light reflex, moist mucous membranes  NECK: Supple, no JVD  RESP: Symmetric movement of the chest, clear to auscultation bilaterally  CVS: Tachycardia, S1 and S2 audible, no murmur, rubs or gallops  GI: Normal active bowel sounds present, abdomen soft, non tender, non distended  EXTREMITIES:  no edema, no clubbing, cyanosis  MSK: 0/5 strength bilateral upper and lower extremities  PSYCH: Sedated  NEURO: alert and oriented x 0 due to sedation    LABS:  CBC Full  -  ( 2018 06:25 )  WBC Count : 14.3 K/uL  Hemoglobin : 10.3 g/dL  Hematocrit : 31.7 %  Platelet Count - Automated : 181 K/uL  Mean Cell Volume : 90.0 fl  Mean Cell Hemoglobin : 29.1 pg  Mean Cell Hemoglobin Concentration : 32.3 gm/dL  Auto Neutrophil # : 13.3 K/uL  Auto Lymphocyte # : 0.4 K/uL  Auto Monocyte # : 0.4 K/uL  Auto Eosinophil # : 0.0 K/uL  Auto Basophil # : 0.1 K/uL  Auto Neutrophil % : 93.4 %  Auto Lymphocyte % : 3.1 %  Auto Monocyte % : 3.0 %  Auto Eosinophil % : 0.0 %  Auto Basophil % : 0.5 %    02-14    140  |  111<H>  |  33<H>  ----------------------------<  210<H>  4.5   |  17<L>  |  1.74<H>    Ca    8.4      2018 06:19  Phos  2.8     02-14  Mg     2.0     02-14    TPro  5.6<L>  /  Alb  2.0<L>  /  TBili  0.3  /  DBili  x   /  AST  23  /  ALT  21  /  AlkPhos  75  02-13    PT/INR - ( 2018 09:27 )   PT: 12.6 sec;   INR: 1.15 ratio         PTT - ( 2018 09:27 )  PTT:30.1 sec  Urinalysis Basic - ( 2018 10:29 )    Color: Pink / Appearance: bloody / S.015 / pH: x  Gluc: x / Ketone: Trace  / Bili: Negative / Urobili: Negative   Blood: x / Protein: 100 / Nitrite: Negative   Leuk Esterase: Moderate / RBC: >50 /HPF / WBC 3-5 /HPF   Sq Epi: x / Non Sq Epi: Occasional /HPF / Bacteria: Trace /HPF      Culture Results:   Culture grew 3 or more types of organisms which indicate  collection contamination; consider recollection only if clinically  indicated. ( @ 13:40)  Culture Results:   No growth to date. ( @ 13:37)  Culture Results:   No growth to date. ( @ 13:37)      RADIOLOGY & ADDITIONAL STUDIES REVIEWED:   CXR: B/L Lower lobe effusions     [ ]GOALS OF CARE DISCUSSION WITH PATIENT/FAMILY/PROXY: DNR, but ok for intubation    CRITICAL CARE TIME SPENT: 35 minutes

## 2018-02-15 LAB
ANION GAP SERPL CALC-SCNC: 12 MMOL/L — SIGNIFICANT CHANGE UP (ref 5–17)
BASE EXCESS BLDA CALC-SCNC: -6 MMOL/L — LOW (ref -2–2)
BLOOD GAS COMMENTS ARTERIAL: SIGNIFICANT CHANGE UP
BUN SERPL-MCNC: 49 MG/DL — HIGH (ref 7–18)
CALCIUM SERPL-MCNC: 8.3 MG/DL — LOW (ref 8.4–10.5)
CHLORIDE SERPL-SCNC: 111 MMOL/L — HIGH (ref 96–108)
CO2 SERPL-SCNC: 18 MMOL/L — LOW (ref 22–31)
CREAT SERPL-MCNC: 1.93 MG/DL — HIGH (ref 0.5–1.3)
CULTURE RESULTS: SIGNIFICANT CHANGE UP
GLUCOSE BLDC GLUCOMTR-MCNC: 131 MG/DL — HIGH (ref 70–99)
GLUCOSE BLDC GLUCOMTR-MCNC: 216 MG/DL — HIGH (ref 70–99)
GLUCOSE BLDC GLUCOMTR-MCNC: 313 MG/DL — HIGH (ref 70–99)
GLUCOSE BLDC GLUCOMTR-MCNC: 320 MG/DL — HIGH (ref 70–99)
GLUCOSE SERPL-MCNC: 278 MG/DL — HIGH (ref 70–99)
HCO3 BLDA-SCNC: 18 MMOL/L — LOW (ref 23–27)
HCT VFR BLD CALC: 31.7 % — LOW (ref 34.5–45)
HGB BLD-MCNC: 9.8 G/DL — LOW (ref 11.5–15.5)
HOROWITZ INDEX BLDA+IHG-RTO: 50 — SIGNIFICANT CHANGE UP
MAGNESIUM SERPL-MCNC: 2.3 MG/DL — SIGNIFICANT CHANGE UP (ref 1.6–2.6)
MCHC RBC-ENTMCNC: 27.2 PG — SIGNIFICANT CHANGE UP (ref 27–34)
MCHC RBC-ENTMCNC: 30.8 GM/DL — LOW (ref 32–36)
MCV RBC AUTO: 88.3 FL — SIGNIFICANT CHANGE UP (ref 80–100)
PCO2 BLDA: 30 MMHG — LOW (ref 32–46)
PH BLDA: 7.39 — SIGNIFICANT CHANGE UP (ref 7.35–7.45)
PHOSPHATE SERPL-MCNC: 3.3 MG/DL — SIGNIFICANT CHANGE UP (ref 2.5–4.5)
PLATELET # BLD AUTO: 168 K/UL — SIGNIFICANT CHANGE UP (ref 150–400)
PO2 BLDA: 95 MMHG — SIGNIFICANT CHANGE UP (ref 74–108)
POTASSIUM SERPL-MCNC: 4.9 MMOL/L — SIGNIFICANT CHANGE UP (ref 3.5–5.3)
POTASSIUM SERPL-SCNC: 4.9 MMOL/L — SIGNIFICANT CHANGE UP (ref 3.5–5.3)
RBC # BLD: 3.59 M/UL — LOW (ref 3.8–5.2)
RBC # FLD: 12.9 % — SIGNIFICANT CHANGE UP (ref 10.3–14.5)
SAO2 % BLDA: 97 % — HIGH (ref 92–96)
SODIUM SERPL-SCNC: 141 MMOL/L — SIGNIFICANT CHANGE UP (ref 135–145)
SPECIMEN SOURCE: SIGNIFICANT CHANGE UP
WBC # BLD: 9.4 K/UL — SIGNIFICANT CHANGE UP (ref 3.8–10.5)
WBC # FLD AUTO: 9.4 K/UL — SIGNIFICANT CHANGE UP (ref 3.8–10.5)

## 2018-02-15 PROCEDURE — 71045 X-RAY EXAM CHEST 1 VIEW: CPT | Mod: 26

## 2018-02-15 RX ORDER — INSULIN GLARGINE 100 [IU]/ML
15 INJECTION, SOLUTION SUBCUTANEOUS AT BEDTIME
Qty: 0 | Refills: 0 | Status: DISCONTINUED | OUTPATIENT
Start: 2018-02-16 | End: 2018-02-17

## 2018-02-15 RX ORDER — INSULIN LISPRO 100/ML
3 VIAL (ML) SUBCUTANEOUS
Qty: 0 | Refills: 0 | Status: DISCONTINUED | OUTPATIENT
Start: 2018-02-15 | End: 2018-02-17

## 2018-02-15 RX ORDER — HYDRALAZINE HCL 50 MG
5 TABLET ORAL ONCE
Qty: 0 | Refills: 0 | Status: COMPLETED | OUTPATIENT
Start: 2018-02-15 | End: 2018-02-15

## 2018-02-15 RX ORDER — INSULIN GLARGINE 100 [IU]/ML
15 INJECTION, SOLUTION SUBCUTANEOUS AT BEDTIME
Qty: 0 | Refills: 0 | Status: DISCONTINUED | OUTPATIENT
Start: 2018-02-15 | End: 2018-02-15

## 2018-02-15 RX ORDER — DEXAMETHASONE 0.5 MG/5ML
2 ELIXIR ORAL EVERY 6 HOURS
Qty: 0 | Refills: 0 | Status: COMPLETED | OUTPATIENT
Start: 2018-02-15 | End: 2018-02-16

## 2018-02-15 RX ORDER — INSULIN GLARGINE 100 [IU]/ML
5 INJECTION, SOLUTION SUBCUTANEOUS ONCE
Qty: 0 | Refills: 0 | Status: COMPLETED | OUTPATIENT
Start: 2018-02-15 | End: 2018-02-15

## 2018-02-15 RX ORDER — IPRATROPIUM/ALBUTEROL SULFATE 18-103MCG
3 AEROSOL WITH ADAPTER (GRAM) INHALATION ONCE
Qty: 0 | Refills: 0 | Status: COMPLETED | OUTPATIENT
Start: 2018-02-15 | End: 2018-02-15

## 2018-02-15 RX ORDER — ACETYLCYSTEINE 200 MG/ML
4 VIAL (ML) MISCELLANEOUS THREE TIMES A DAY
Qty: 0 | Refills: 0 | Status: DISCONTINUED | OUTPATIENT
Start: 2018-02-15 | End: 2018-02-16

## 2018-02-15 RX ADMIN — Medication 81 MILLIGRAM(S): at 11:51

## 2018-02-15 RX ADMIN — ATORVASTATIN CALCIUM 20 MILLIGRAM(S): 80 TABLET, FILM COATED ORAL at 22:48

## 2018-02-15 RX ADMIN — Medication 2: at 18:16

## 2018-02-15 RX ADMIN — PANTOPRAZOLE SODIUM 40 MILLIGRAM(S): 20 TABLET, DELAYED RELEASE ORAL at 11:51

## 2018-02-15 RX ADMIN — Medication 3 UNIT(S): at 18:16

## 2018-02-15 RX ADMIN — DEXMEDETOMIDINE HYDROCHLORIDE IN 0.9% SODIUM CHLORIDE 3.17 MICROGRAM(S)/KG/HR: 4 INJECTION INTRAVENOUS at 02:19

## 2018-02-15 RX ADMIN — Medication 25 MICROGRAM(S): at 05:20

## 2018-02-15 RX ADMIN — Medication 2 MILLIGRAM(S): at 23:06

## 2018-02-15 RX ADMIN — Medication 4: at 11:52

## 2018-02-15 RX ADMIN — Medication 3 MILLILITER(S): at 10:45

## 2018-02-15 RX ADMIN — Medication 4: at 06:32

## 2018-02-15 RX ADMIN — Medication 30 MILLIGRAM(S): at 13:48

## 2018-02-15 RX ADMIN — Medication 5 MILLIGRAM(S): at 01:25

## 2018-02-15 RX ADMIN — HEPARIN SODIUM 5000 UNIT(S): 5000 INJECTION INTRAVENOUS; SUBCUTANEOUS at 05:20

## 2018-02-15 RX ADMIN — CEFTRIAXONE 100 GRAM(S): 500 INJECTION, POWDER, FOR SOLUTION INTRAMUSCULAR; INTRAVENOUS at 18:17

## 2018-02-15 RX ADMIN — Medication 10 MILLIGRAM(S): at 13:48

## 2018-02-15 RX ADMIN — Medication 10 MILLIGRAM(S): at 22:48

## 2018-02-15 RX ADMIN — DONEPEZIL HYDROCHLORIDE 10 MILLIGRAM(S): 10 TABLET, FILM COATED ORAL at 22:48

## 2018-02-15 RX ADMIN — HEPARIN SODIUM 5000 UNIT(S): 5000 INJECTION INTRAVENOUS; SUBCUTANEOUS at 18:16

## 2018-02-15 RX ADMIN — DEXMEDETOMIDINE HYDROCHLORIDE IN 0.9% SODIUM CHLORIDE 3.17 MICROGRAM(S)/KG/HR: 4 INJECTION INTRAVENOUS at 06:33

## 2018-02-15 RX ADMIN — Medication 2 MILLIGRAM(S): at 13:03

## 2018-02-15 RX ADMIN — CITALOPRAM 5 MILLIGRAM(S): 10 TABLET, FILM COATED ORAL at 22:48

## 2018-02-15 RX ADMIN — RALOXIFENE HYDROCHLORIDE 60 MILLIGRAM(S): 60 TABLET, COATED ORAL at 11:51

## 2018-02-15 RX ADMIN — Medication 650 MILLIGRAM(S): at 22:48

## 2018-02-15 RX ADMIN — INSULIN GLARGINE 5 UNIT(S): 100 INJECTION, SOLUTION SUBCUTANEOUS at 23:09

## 2018-02-15 RX ADMIN — Medication 5 MILLIGRAM(S): at 02:19

## 2018-02-15 RX ADMIN — Medication 10 MILLIGRAM(S): at 05:20

## 2018-02-15 RX ADMIN — CHLORHEXIDINE GLUCONATE 1 APPLICATION(S): 213 SOLUTION TOPICAL at 11:52

## 2018-02-15 RX ADMIN — AZITHROMYCIN 250 MILLIGRAM(S): 500 TABLET, FILM COATED ORAL at 05:16

## 2018-02-15 RX ADMIN — Medication 3 UNIT(S): at 11:52

## 2018-02-15 RX ADMIN — Medication 2 MILLIGRAM(S): at 18:16

## 2018-02-15 RX ADMIN — Medication 4 MILLIGRAM(S): at 05:20

## 2018-02-15 NOTE — PROGRESS NOTE ADULT - PROBLEM SELECTOR PLAN 9
Patient has hx of CVA with left hemiparesis but only takes lipitor 20 mg (not on aspirin)   C/w ASA, Statin Patient has hx of CVA with left hemiparesis but only takes Lipitor 20 mg (not on aspirin)   C/w ASA, Statin

## 2018-02-15 NOTE — AIRWAY REMOVAL NOTE  ADULT & PEDS - ARTIFICAL AIRWAY REMOVAL COMMENTS
Leak test performed and results were negative. No leak present. AS per dr. Cid, pt was still extubated to 50% aerosol mask and tolerated well. NARD Noted. HR- 77, Y3thy-87%

## 2018-02-15 NOTE — PROGRESS NOTE ADULT - PROBLEM SELECTOR PLAN 2
UA dirty  Started Rocephin  F/u Urine Cx UA dirty  Started Rocephin  Initial Cx contamination  F/u Repeat Urine Cx

## 2018-02-15 NOTE — PROGRESS NOTE ADULT - SUBJECTIVE AND OBJECTIVE BOX
Patient seen and examined.   Time of visit:    MEDICATIONS  (STANDING):  acetylcysteine 10% Inhalation 4 milliLiter(s) Inhalation three times a day  aspirin  chewable 81 milliGRAM(s) Oral daily  atorvastatin 20 milliGRAM(s) Oral at bedtime  azithromycin  IVPB 500 milliGRAM(s) IV Intermittent every 24 hours  baclofen 10 milliGRAM(s) Oral three times a day  cefTRIAXone   IVPB 1 Gram(s) IV Intermittent every 24 hours  chlorhexidine 4% Liquid 1 Application(s) Topical daily  citalopram 5 milliGRAM(s) Oral at bedtime  dexamethasone  Injectable 2 milliGRAM(s) IV Push every 6 hours  donepezil 10 milliGRAM(s) Oral at bedtime  heparin  Injectable 5000 Unit(s) SubCutaneous every 12 hours  insulin glargine Injectable (LANTUS) 15 Unit(s) SubCutaneous at bedtime  insulin lispro (HumaLOG) corrective regimen sliding scale   SubCutaneous every 6 hours  insulin lispro Injectable (HumaLOG) 3 Unit(s) SubCutaneous three times a day with meals  levothyroxine 25 MICROGram(s) Oral daily  oseltamivir 30 milliGRAM(s) Oral daily  pantoprazole  Injectable 40 milliGRAM(s) IV Push daily  raloxifene 60 milliGRAM(s) Oral daily      MEDICATIONS  (PRN):  acetaminophen    Suspension 650 milliGRAM(s) Oral every 6 hours PRN For Temp greater than 38 C (100.4 F)   Medications up to date at time of exam.      PHYSICAL EXAMINATION:  Patient has no new complaints.  GENERAL: The patient is a well-developed, well-nourished, in no apparent distress.     Vital Signs Last 24 Hrs  T(C): 36.8 (15 Feb 2018 15:41), Max: 37.2 (14 Feb 2018 19:53)  T(F): 98.3 (15 Feb 2018 15:41), Max: 98.9 (14 Feb 2018 19:53)  HR: 88 (15 Feb 2018 15:00) (58 - 104)  BP: 111/45 (15 Feb 2018 15:00) (95/45 - 185/67)  BP(mean): 66 (15 Feb 2018 15:00) (57 - 100)  RR: 18 (15 Feb 2018 15:00) (14 - 22)  SpO2: 96% (15 Feb 2018 15:00) (92% - 98%)  Mode: CPAP with PS  FiO2: 50  PEEP: 5  PS: 5   (if applicable)      HEENT: Head is normocephalic and atraumatic. Extraocular muscles are intact. Mucous membranes are moist.     NECK: Supple, no palpable adenopathy.    LUNGS: Clear to auscultation, no wheezing, rales, or rhonchi.    HEART: Regular rate and rhythm without murmur.    ABDOMEN: Soft, nontender, and nondistended.  No hepatosplenomegaly is noted.    EXTREMITIES: Without any cyanosis, clubbing, rash, lesions or edema.    NEUROLOGIC: Awake, alert, oriented.     SKIN: Warm, dry, good turgor.      LABS:                        9.8    9.4   )-----------( 168      ( 15 Feb 2018 06:22 )             31.7     02-15    141  |  111<H>  |  49<H>  ----------------------------<  278<H>  4.9   |  18<L>  |  1.93<H>    Ca    8.3<L>      15 Feb 2018 06:22  Phos  3.3     02-15  Mg     2.3     02-15          ABG - ( 15 Feb 2018 04:41 )  pH: 7.39  /  pCO2: 30    /  pO2: 95    / HCO3: 18    / Base Excess: -6.0  /  SaO2: 97                          Procalcitonin, Serum: 6.11 ng/mL (02-13-18 @ 00:55)      MICROBIOLOGY: (if applicable)    RADIOLOGY & ADDITIONAL STUDIES:  EKG:   CXR:  ECHO:    IMPRESSION: 77y Female PAST MEDICAL & SURGICAL HISTORY:  MS (multiple sclerosis)  CVA (cerebral vascular accident)  Diabetes mellitus  Hypertension  Hypothyroidism      Patient presents with SOB, respiratory failure due to flu, increased troponins due to demand ischemia.   Patient with intermittent tachycardia, in setting of infection, improving  2D echo shows grossly normal EF.  Patient was extubated today, clinically improved  BP stable, con't w/ supportive care.

## 2018-02-15 NOTE — PROGRESS NOTE ADULT - PROBLEM SELECTOR PLAN 3
EKG: NSR  Mildly elevated trop likely due to demand  No Chest pain on admission  Echo: 55% Ef with normal Lt and Rt ventricle function  Dr De León on board

## 2018-02-15 NOTE — PROGRESS NOTE ADULT - PROBLEM SELECTOR PLAN 1
Secondary to RVP: Influenza B  Suspicion of Acute Bronchitis  Leukocytosis resolved  Started Tamiflu (day 4) in addition to Zithromax  C/w Mechanical Ventilation as patient has difficult airway  C/w Decadron x 2 days and SBT today  Consulted Dr Wiseman ENT Secondary to RVP: Influenza B  Suspicion of Acute Bronchitis  S/p Extubation 2/15/18  Leukocytosis resolved  C/w Tamiflu (day 4) in addition to Zithromax  C/w Decadron taper  Consulted Dr Wiseman ENT

## 2018-02-16 LAB
ANION GAP SERPL CALC-SCNC: 14 MMOL/L — SIGNIFICANT CHANGE UP (ref 5–17)
BUN SERPL-MCNC: 64 MG/DL — HIGH (ref 7–18)
CALCIUM SERPL-MCNC: 8.1 MG/DL — LOW (ref 8.4–10.5)
CHLORIDE SERPL-SCNC: 111 MMOL/L — HIGH (ref 96–108)
CO2 SERPL-SCNC: 18 MMOL/L — LOW (ref 22–31)
CREAT SERPL-MCNC: 1.98 MG/DL — HIGH (ref 0.5–1.3)
GLUCOSE BLDC GLUCOMTR-MCNC: 109 MG/DL — HIGH (ref 70–99)
GLUCOSE BLDC GLUCOMTR-MCNC: 155 MG/DL — HIGH (ref 70–99)
GLUCOSE BLDC GLUCOMTR-MCNC: 191 MG/DL — HIGH (ref 70–99)
GLUCOSE BLDC GLUCOMTR-MCNC: 69 MG/DL — LOW (ref 70–99)
GLUCOSE BLDC GLUCOMTR-MCNC: 77 MG/DL — SIGNIFICANT CHANGE UP (ref 70–99)
GLUCOSE SERPL-MCNC: 198 MG/DL — HIGH (ref 70–99)
HCT VFR BLD CALC: 30.3 % — LOW (ref 34.5–45)
HGB BLD-MCNC: 9.9 G/DL — LOW (ref 11.5–15.5)
MAGNESIUM SERPL-MCNC: 2.2 MG/DL — SIGNIFICANT CHANGE UP (ref 1.6–2.6)
MCHC RBC-ENTMCNC: 29.6 PG — SIGNIFICANT CHANGE UP (ref 27–34)
MCHC RBC-ENTMCNC: 32.8 GM/DL — SIGNIFICANT CHANGE UP (ref 32–36)
MCV RBC AUTO: 90.2 FL — SIGNIFICANT CHANGE UP (ref 80–100)
PHOSPHATE SERPL-MCNC: 3.7 MG/DL — SIGNIFICANT CHANGE UP (ref 2.5–4.5)
PLATELET # BLD AUTO: 207 K/UL — SIGNIFICANT CHANGE UP (ref 150–400)
POTASSIUM SERPL-MCNC: 4 MMOL/L — SIGNIFICANT CHANGE UP (ref 3.5–5.3)
POTASSIUM SERPL-SCNC: 4 MMOL/L — SIGNIFICANT CHANGE UP (ref 3.5–5.3)
RBC # BLD: 3.36 M/UL — LOW (ref 3.8–5.2)
RBC # FLD: 13.7 % — SIGNIFICANT CHANGE UP (ref 10.3–14.5)
SODIUM SERPL-SCNC: 143 MMOL/L — SIGNIFICANT CHANGE UP (ref 135–145)
WBC # BLD: 17.9 K/UL — HIGH (ref 3.8–10.5)
WBC # FLD AUTO: 17.9 K/UL — HIGH (ref 3.8–10.5)

## 2018-02-16 PROCEDURE — 71045 X-RAY EXAM CHEST 1 VIEW: CPT | Mod: 26

## 2018-02-16 RX ORDER — ROBINUL 0.2 MG/ML
0.2 INJECTION INTRAMUSCULAR; INTRAVENOUS EVERY 6 HOURS
Qty: 0 | Refills: 0 | Status: DISCONTINUED | OUTPATIENT
Start: 2018-02-16 | End: 2018-02-22

## 2018-02-16 RX ORDER — INSULIN LISPRO 100/ML
VIAL (ML) SUBCUTANEOUS EVERY 4 HOURS
Qty: 0 | Refills: 0 | Status: DISCONTINUED | OUTPATIENT
Start: 2018-02-16 | End: 2018-02-17

## 2018-02-16 RX ORDER — IPRATROPIUM/ALBUTEROL SULFATE 18-103MCG
3 AEROSOL WITH ADAPTER (GRAM) INHALATION ONCE
Qty: 0 | Refills: 0 | Status: COMPLETED | OUTPATIENT
Start: 2018-02-16 | End: 2018-02-16

## 2018-02-16 RX ORDER — DEXTROSE 50 % IN WATER 50 %
50 SYRINGE (ML) INTRAVENOUS ONCE
Qty: 0 | Refills: 0 | Status: COMPLETED | OUTPATIENT
Start: 2018-02-16 | End: 2018-02-16

## 2018-02-16 RX ORDER — DEXAMETHASONE 0.5 MG/5ML
1 ELIXIR ORAL EVERY 6 HOURS
Qty: 0 | Refills: 0 | Status: DISCONTINUED | OUTPATIENT
Start: 2018-02-16 | End: 2018-02-16

## 2018-02-16 RX ORDER — LABETALOL HCL 100 MG
10 TABLET ORAL ONCE
Qty: 0 | Refills: 0 | Status: COMPLETED | OUTPATIENT
Start: 2018-02-16 | End: 2018-02-16

## 2018-02-16 RX ORDER — LABETALOL HCL 100 MG
10 TABLET ORAL EVERY 8 HOURS
Qty: 0 | Refills: 0 | Status: DISCONTINUED | OUTPATIENT
Start: 2018-02-16 | End: 2018-02-22

## 2018-02-16 RX ORDER — LABETALOL HCL 100 MG
10 TABLET ORAL EVERY 8 HOURS
Qty: 0 | Refills: 0 | Status: DISCONTINUED | OUTPATIENT
Start: 2018-02-16 | End: 2018-02-16

## 2018-02-16 RX ORDER — MORPHINE SULFATE 50 MG/1
0.5 CAPSULE, EXTENDED RELEASE ORAL
Qty: 0 | Refills: 0 | Status: DISCONTINUED | OUTPATIENT
Start: 2018-02-16 | End: 2018-02-22

## 2018-02-16 RX ORDER — SODIUM CHLORIDE 9 MG/ML
4 INJECTION INTRAMUSCULAR; INTRAVENOUS; SUBCUTANEOUS EVERY 6 HOURS
Qty: 0 | Refills: 0 | Status: DISCONTINUED | OUTPATIENT
Start: 2018-02-16 | End: 2018-02-21

## 2018-02-16 RX ADMIN — AZITHROMYCIN 250 MILLIGRAM(S): 500 TABLET, FILM COATED ORAL at 06:26

## 2018-02-16 RX ADMIN — PANTOPRAZOLE SODIUM 40 MILLIGRAM(S): 20 TABLET, DELAYED RELEASE ORAL at 11:46

## 2018-02-16 RX ADMIN — SODIUM CHLORIDE 4 MILLILITER(S): 9 INJECTION INTRAMUSCULAR; INTRAVENOUS; SUBCUTANEOUS at 20:26

## 2018-02-16 RX ADMIN — ROBINUL 0.2 MILLIGRAM(S): 0.2 INJECTION INTRAMUSCULAR; INTRAVENOUS at 13:18

## 2018-02-16 RX ADMIN — CHLORHEXIDINE GLUCONATE 1 APPLICATION(S): 213 SOLUTION TOPICAL at 11:48

## 2018-02-16 RX ADMIN — Medication 10 MILLIGRAM(S): at 11:46

## 2018-02-16 RX ADMIN — CEFTRIAXONE 100 GRAM(S): 500 INJECTION, POWDER, FOR SOLUTION INTRAMUSCULAR; INTRAVENOUS at 18:28

## 2018-02-16 RX ADMIN — Medication 10 MILLIGRAM(S): at 13:20

## 2018-02-16 RX ADMIN — Medication 50 MILLILITER(S): at 18:26

## 2018-02-16 RX ADMIN — SODIUM CHLORIDE 4 MILLILITER(S): 9 INJECTION INTRAMUSCULAR; INTRAVENOUS; SUBCUTANEOUS at 15:40

## 2018-02-16 RX ADMIN — Medication 3 UNIT(S): at 08:46

## 2018-02-16 RX ADMIN — Medication 10 MILLIGRAM(S): at 18:26

## 2018-02-16 RX ADMIN — Medication 2 MILLIGRAM(S): at 06:25

## 2018-02-16 RX ADMIN — HEPARIN SODIUM 5000 UNIT(S): 5000 INJECTION INTRAVENOUS; SUBCUTANEOUS at 18:26

## 2018-02-16 RX ADMIN — Medication 2: at 06:42

## 2018-02-16 RX ADMIN — Medication 3 MILLILITER(S): at 00:59

## 2018-02-16 RX ADMIN — Medication 10 MILLIGRAM(S): at 06:29

## 2018-02-16 NOTE — CHART NOTE - NSCHARTNOTEFT_GEN_A_CORE
Spoke to the health care proxy about the Goals of care.  She wanted no artificial feeds, No Intubation or Resuscitation.    Spoke to HCP about Hospice and she aggred but she wanted Inpatient setting as she will not be able to take care of her at home due to limited resources. Spoke to the health care proxy about the Goals of care.  She wanted no artificial feeds, No Intubation or Resuscitation.    Spoke to HCP about Hospice and she aggred but she wanted Inpatient setting as she will not be able to take care of her at home due to limited resources.    Living will in charts

## 2018-02-16 NOTE — PROGRESS NOTE ADULT - SUBJECTIVE AND OBJECTIVE BOX
INTERVAL HPI/OVERNIGHT EVENTS: No overnight events. Pt got tachy after use of Duoneb. Pt also pulled out NG tube.     PRESSORS: [ ] YES [x] NO    ANTIBIOTICS: Tamiflu                 DATE STARTED: Feb 12  ANTIBIOTICS: Zithromax             DATE STARTED: Feb 12  ANTIBIOTICS: Rocephin              DATE STARTED: Feb 12    Antimicrobial:  azithromycin  IVPB 500 milliGRAM(s) IV Intermittent every 24 hours  cefTRIAXone   IVPB 1 Gram(s) IV Intermittent every 24 hours  oseltamivir 30 milliGRAM(s) Oral daily    Pulmonary:  acetylcysteine 10% Inhalation 4 milliLiter(s) Inhalation three times a day    Hematalogic:  aspirin  chewable 81 milliGRAM(s) Oral daily  heparin  Injectable 5000 Unit(s) SubCutaneous every 12 hours    Other:  acetaminophen    Suspension 650 milliGRAM(s) Oral every 6 hours PRN  atorvastatin 20 milliGRAM(s) Oral at bedtime  baclofen 10 milliGRAM(s) Oral three times a day  chlorhexidine 4% Liquid 1 Application(s) Topical daily  citalopram 5 milliGRAM(s) Oral at bedtime  donepezil 10 milliGRAM(s) Oral at bedtime  insulin glargine Injectable (LANTUS) 15 Unit(s) SubCutaneous at bedtime  insulin lispro (HumaLOG) corrective regimen sliding scale   SubCutaneous every 6 hours  insulin lispro Injectable (HumaLOG) 3 Unit(s) SubCutaneous three times a day with meals  levothyroxine 25 MICROGram(s) Oral daily  pantoprazole  Injectable 40 milliGRAM(s) IV Push daily  raloxifene 60 milliGRAM(s) Oral daily    acetaminophen    Suspension 650 milliGRAM(s) Oral every 6 hours PRN  acetylcysteine 10% Inhalation 4 milliLiter(s) Inhalation three times a day  aspirin  chewable 81 milliGRAM(s) Oral daily  atorvastatin 20 milliGRAM(s) Oral at bedtime  azithromycin  IVPB 500 milliGRAM(s) IV Intermittent every 24 hours  baclofen 10 milliGRAM(s) Oral three times a day  cefTRIAXone   IVPB 1 Gram(s) IV Intermittent every 24 hours  chlorhexidine 4% Liquid 1 Application(s) Topical daily  citalopram 5 milliGRAM(s) Oral at bedtime  donepezil 10 milliGRAM(s) Oral at bedtime  heparin  Injectable 5000 Unit(s) SubCutaneous every 12 hours  insulin glargine Injectable (LANTUS) 15 Unit(s) SubCutaneous at bedtime  insulin lispro (HumaLOG) corrective regimen sliding scale   SubCutaneous every 6 hours  insulin lispro Injectable (HumaLOG) 3 Unit(s) SubCutaneous three times a day with meals  levothyroxine 25 MICROGram(s) Oral daily  oseltamivir 30 milliGRAM(s) Oral daily  pantoprazole  Injectable 40 milliGRAM(s) IV Push daily  raloxifene 60 milliGRAM(s) Oral daily    Drug Dosing Weight  Height (cm): 160.02 (12 Feb 2018 08:58)  Weight (kg): 63.5 (12 Feb 2018 08:58)  BMI (kg/m2): 24.8 (12 Feb 2018 08:58)  BSA (m2): 1.66 (12 Feb 2018 08:58)    CENTRAL LINE: [ ] YES [x] NO  LOCATION:   DATE INSERTED:    SANDERS: [x] YES [ ] NO    DATE INSERTED:    A-LINE:  [ ] YES [x] NO  LOCATION:   DATE INSERTED:    ICU Vital Signs Last 24 Hrs  T(C): 36.9 (16 Feb 2018 05:00), Max: 37.1 (15 Feb 2018 23:00)  T(F): 98.4 (16 Feb 2018 05:00), Max: 98.8 (15 Feb 2018 23:00)  HR: 106 (16 Feb 2018 08:00) (74 - 128)  BP: 169/70 (16 Feb 2018 08:00) (95/45 - 185/92)  BP(mean): 97 (16 Feb 2018 08:00) (57 - 113)  ABP: --  ABP(mean): --  RR: 15 (16 Feb 2018 08:00) (14 - 27)  SpO2: 98% (16 Feb 2018 08:00) (89% - 100%)      ABG - ( 15 Feb 2018 04:41 )  pH: 7.39  /  pCO2: 30    /  pO2: 95    / HCO3: 18    / Base Excess: -6.0  /  SaO2: 97                    02-15 @ 07:01  -  02-16 @ 07:00  --------------------------------------------------------  IN: 585.6 mL / OUT: 870 mL / NET: -284.4 mL        Mode: CPAP with PS  FiO2: 50  PEEP: 5  PS: 5      PHYSICAL EXAM:    GENERAL: Well developed old female, NAD due to sedation  HEENT:  Normocephalic/Atraumatic, reactive light reflex, moist mucous membranes  NECK: Supple, no JVD  RESP: Symmetric movement of the chest, clear to auscultation bilaterally  CVS: Tachycardia, S1 and S2 audible, no murmur, rubs or gallops  GI: Normal active bowel sounds present, abdomen soft, non tender, non distended  EXTREMITIES:  no edema, no clubbing, cyanosis  MSK: 0/5 strength bilateral upper and lower extremities  PSYCH: Sedated  NEURO: alert and oriented x 0 due to sedation    LABS:  CBC Full  -  ( 16 Feb 2018 07:06 )  WBC Count : 17.9 K/uL  Hemoglobin : 9.9 g/dL  Hematocrit : 30.3 %  Platelet Count - Automated : 207 K/uL  Mean Cell Volume : 90.2 fl  Mean Cell Hemoglobin : 29.6 pg  Mean Cell Hemoglobin Concentration : 32.8 gm/dL  Auto Neutrophil # : x  Auto Lymphocyte # : x  Auto Monocyte # : x  Auto Eosinophil # : x  Auto Basophil # : x  Auto Neutrophil % : x  Auto Lymphocyte % : x  Auto Monocyte % : x  Auto Eosinophil % : x  Auto Basophil % : x    02-16    143  |  111<H>  |  64<H>  ----------------------------<  198<H>  4.0   |  18<L>  |  1.98<H>    Ca    8.1<L>      16 Feb 2018 07:06  Phos  3.7     02-16  Mg     2.2     02-16          Culture Results:   Normal Respiratory Luz present (02-13 @ 18:44)    [ ]GOALS OF CARE DISCUSSION WITH PATIENT/FAMILY/PROXY: artur JALLOH for intubation    CRITICAL CARE TIME SPENT: 35 minutes

## 2018-02-16 NOTE — PROGRESS NOTE ADULT - ATTENDING COMMENTS
77 female with hx of MS, CVA with residual left hemiparesis, bedbound, admitted with acute respiratory failure due to influenza B and pneumonia.  Currently still extubated but still with lots of secretions, doesn't seem to be able to clear secretions, high risk for needing reintubation.  Plan:  - continue tamiflu, abx  - nebs, suctioning  - swallow eval  - palliative care consult/ goals of care discussion

## 2018-02-16 NOTE — PROGRESS NOTE ADULT - PROBLEM SELECTOR PLAN 1
Secondary to RVP: Influenza B  Suspicion of Acute Bronchitis  S/p Extubation 2/15/18  Leukocytosis 9 --> 17  C/w Tamiflu (day 5) in addition to Zithromax  C/w Decadron taper  Consulted Dr Wiseman ENT Secondary to RVP: Influenza B  Suspicion of Acute Bronchitis  S/p Extubation 2/15/18  Leukocytosis 9 --> 17  C/w Tamiflu (day 5) in addition to Zithromax  D/c Decadron  Started Morphine and Robinul for secretions  F/u Palliative consult (No Re-intubation)  Consulted Dr Wiseman ENT

## 2018-02-16 NOTE — PROGRESS NOTE ADULT - PROBLEM SELECTOR PLAN 8
Hold PO antihyperglycemic  Unstable FS due to steroids  Continue with HSS and Lantus 10/Humalog 3  5.7 HbA1c

## 2018-02-17 LAB
ANION GAP SERPL CALC-SCNC: 11 MMOL/L — SIGNIFICANT CHANGE UP (ref 5–17)
BUN SERPL-MCNC: 48 MG/DL — HIGH (ref 7–18)
CALCIUM SERPL-MCNC: 8.4 MG/DL — SIGNIFICANT CHANGE UP (ref 8.4–10.5)
CHLORIDE SERPL-SCNC: 113 MMOL/L — HIGH (ref 96–108)
CO2 SERPL-SCNC: 22 MMOL/L — SIGNIFICANT CHANGE UP (ref 22–31)
CREAT SERPL-MCNC: 1.61 MG/DL — HIGH (ref 0.5–1.3)
CULTURE RESULTS: SIGNIFICANT CHANGE UP
CULTURE RESULTS: SIGNIFICANT CHANGE UP
GLUCOSE BLDC GLUCOMTR-MCNC: 119 MG/DL — HIGH (ref 70–99)
GLUCOSE BLDC GLUCOMTR-MCNC: 124 MG/DL — HIGH (ref 70–99)
GLUCOSE BLDC GLUCOMTR-MCNC: 131 MG/DL — HIGH (ref 70–99)
GLUCOSE BLDC GLUCOMTR-MCNC: 175 MG/DL — HIGH (ref 70–99)
GLUCOSE BLDC GLUCOMTR-MCNC: 64 MG/DL — LOW (ref 70–99)
GLUCOSE BLDC GLUCOMTR-MCNC: 93 MG/DL — SIGNIFICANT CHANGE UP (ref 70–99)
GLUCOSE SERPL-MCNC: 52 MG/DL — LOW (ref 70–99)
HCT VFR BLD CALC: 32.3 % — LOW (ref 34.5–45)
HGB BLD-MCNC: 10.4 G/DL — LOW (ref 11.5–15.5)
MAGNESIUM SERPL-MCNC: 2.1 MG/DL — SIGNIFICANT CHANGE UP (ref 1.6–2.6)
MCHC RBC-ENTMCNC: 29.2 PG — SIGNIFICANT CHANGE UP (ref 27–34)
MCHC RBC-ENTMCNC: 32.3 GM/DL — SIGNIFICANT CHANGE UP (ref 32–36)
MCV RBC AUTO: 90.5 FL — SIGNIFICANT CHANGE UP (ref 80–100)
PHOSPHATE SERPL-MCNC: 3 MG/DL — SIGNIFICANT CHANGE UP (ref 2.5–4.5)
PLATELET # BLD AUTO: 255 K/UL — SIGNIFICANT CHANGE UP (ref 150–400)
POTASSIUM SERPL-MCNC: 3.6 MMOL/L — SIGNIFICANT CHANGE UP (ref 3.5–5.3)
POTASSIUM SERPL-SCNC: 3.6 MMOL/L — SIGNIFICANT CHANGE UP (ref 3.5–5.3)
RBC # BLD: 3.57 M/UL — LOW (ref 3.8–5.2)
RBC # FLD: 13.6 % — SIGNIFICANT CHANGE UP (ref 10.3–14.5)
SODIUM SERPL-SCNC: 146 MMOL/L — HIGH (ref 135–145)
SPECIMEN SOURCE: SIGNIFICANT CHANGE UP
SPECIMEN SOURCE: SIGNIFICANT CHANGE UP
WBC # BLD: 11.3 K/UL — HIGH (ref 3.8–10.5)
WBC # FLD AUTO: 11.3 K/UL — HIGH (ref 3.8–10.5)

## 2018-02-17 RX ORDER — CARVEDILOL PHOSPHATE 80 MG/1
3.12 CAPSULE, EXTENDED RELEASE ORAL EVERY 12 HOURS
Qty: 0 | Refills: 0 | Status: DISCONTINUED | OUTPATIENT
Start: 2018-02-17 | End: 2018-02-22

## 2018-02-17 RX ORDER — LEVOTHYROXINE SODIUM 125 MCG
25 TABLET ORAL DAILY
Qty: 0 | Refills: 0 | Status: DISCONTINUED | OUTPATIENT
Start: 2018-02-17 | End: 2018-02-22

## 2018-02-17 RX ORDER — NYSTATIN CREAM 100000 [USP'U]/G
1 CREAM TOPICAL DAILY
Qty: 0 | Refills: 0 | Status: DISCONTINUED | OUTPATIENT
Start: 2018-02-17 | End: 2018-02-22

## 2018-02-17 RX ORDER — INSULIN LISPRO 100/ML
VIAL (ML) SUBCUTANEOUS
Qty: 0 | Refills: 0 | Status: DISCONTINUED | OUTPATIENT
Start: 2018-02-17 | End: 2018-02-22

## 2018-02-17 RX ORDER — LABETALOL HCL 100 MG
10 TABLET ORAL ONCE
Qty: 0 | Refills: 0 | Status: COMPLETED | OUTPATIENT
Start: 2018-02-17 | End: 2018-02-17

## 2018-02-17 RX ORDER — INSULIN GLARGINE 100 [IU]/ML
10 INJECTION, SOLUTION SUBCUTANEOUS AT BEDTIME
Qty: 0 | Refills: 0 | Status: DISCONTINUED | OUTPATIENT
Start: 2018-02-17 | End: 2018-02-17

## 2018-02-17 RX ORDER — LOSARTAN POTASSIUM 100 MG/1
50 TABLET, FILM COATED ORAL DAILY
Qty: 0 | Refills: 0 | Status: DISCONTINUED | OUTPATIENT
Start: 2018-02-17 | End: 2018-02-22

## 2018-02-17 RX ORDER — DEXTROSE 50 % IN WATER 50 %
50 SYRINGE (ML) INTRAVENOUS ONCE
Qty: 0 | Refills: 0 | Status: COMPLETED | OUTPATIENT
Start: 2018-02-17 | End: 2018-02-17

## 2018-02-17 RX ORDER — LEVOTHYROXINE SODIUM 125 MCG
12.5 TABLET ORAL AT BEDTIME
Qty: 0 | Refills: 0 | Status: DISCONTINUED | OUTPATIENT
Start: 2018-02-17 | End: 2018-02-17

## 2018-02-17 RX ADMIN — CEFTRIAXONE 100 GRAM(S): 500 INJECTION, POWDER, FOR SOLUTION INTRAMUSCULAR; INTRAVENOUS at 18:38

## 2018-02-17 RX ADMIN — DONEPEZIL HYDROCHLORIDE 10 MILLIGRAM(S): 10 TABLET, FILM COATED ORAL at 22:20

## 2018-02-17 RX ADMIN — AZITHROMYCIN 250 MILLIGRAM(S): 500 TABLET, FILM COATED ORAL at 05:38

## 2018-02-17 RX ADMIN — Medication 10 MILLIGRAM(S): at 13:36

## 2018-02-17 RX ADMIN — HEPARIN SODIUM 5000 UNIT(S): 5000 INJECTION INTRAVENOUS; SUBCUTANEOUS at 18:41

## 2018-02-17 RX ADMIN — ATORVASTATIN CALCIUM 20 MILLIGRAM(S): 80 TABLET, FILM COATED ORAL at 22:20

## 2018-02-17 RX ADMIN — Medication 12.5 MICROGRAM(S): at 09:20

## 2018-02-17 RX ADMIN — PANTOPRAZOLE SODIUM 40 MILLIGRAM(S): 20 TABLET, DELAYED RELEASE ORAL at 12:04

## 2018-02-17 RX ADMIN — RALOXIFENE HYDROCHLORIDE 60 MILLIGRAM(S): 60 TABLET, COATED ORAL at 12:07

## 2018-02-17 RX ADMIN — Medication 10 MILLIGRAM(S): at 13:31

## 2018-02-17 RX ADMIN — SODIUM CHLORIDE 4 MILLILITER(S): 9 INJECTION INTRAMUSCULAR; INTRAVENOUS; SUBCUTANEOUS at 20:41

## 2018-02-17 RX ADMIN — CITALOPRAM 5 MILLIGRAM(S): 10 TABLET, FILM COATED ORAL at 22:20

## 2018-02-17 RX ADMIN — SODIUM CHLORIDE 4 MILLILITER(S): 9 INJECTION INTRAMUSCULAR; INTRAVENOUS; SUBCUTANEOUS at 03:31

## 2018-02-17 RX ADMIN — Medication 10 MILLIGRAM(S): at 22:20

## 2018-02-17 RX ADMIN — HEPARIN SODIUM 5000 UNIT(S): 5000 INJECTION INTRAVENOUS; SUBCUTANEOUS at 05:38

## 2018-02-17 RX ADMIN — CARVEDILOL PHOSPHATE 3.12 MILLIGRAM(S): 80 CAPSULE, EXTENDED RELEASE ORAL at 20:00

## 2018-02-17 RX ADMIN — Medication 50 MILLILITER(S): at 08:55

## 2018-02-17 RX ADMIN — Medication 10 MILLIGRAM(S): at 09:19

## 2018-02-17 RX ADMIN — CHLORHEXIDINE GLUCONATE 1 APPLICATION(S): 213 SOLUTION TOPICAL at 12:04

## 2018-02-17 RX ADMIN — Medication 81 MILLIGRAM(S): at 12:06

## 2018-02-17 RX ADMIN — Medication 1 PATCH: at 12:04

## 2018-02-17 RX ADMIN — SODIUM CHLORIDE 4 MILLILITER(S): 9 INJECTION INTRAMUSCULAR; INTRAVENOUS; SUBCUTANEOUS at 16:01

## 2018-02-17 RX ADMIN — SODIUM CHLORIDE 4 MILLILITER(S): 9 INJECTION INTRAMUSCULAR; INTRAVENOUS; SUBCUTANEOUS at 09:20

## 2018-02-17 RX ADMIN — Medication 10 MILLIGRAM(S): at 16:40

## 2018-02-17 NOTE — PROGRESS NOTE ADULT - PROBLEM SELECTOR PLAN 8
Hold PO antihyperglycemic  Unstable FS due to steroids  Continue with HSS and Lantus 10/Humalog 3  5.7 HbA1c Hold PO antihyperglycemic  Continue with HSS and decrease lantus to 10 units and d/c standing dose of humalog as patient is off steroids and accuchecks are better controlled.  5.7 HbA1c

## 2018-02-17 NOTE — PROGRESS NOTE ADULT - ASSESSMENT
77 F from home, lives with friend PMH of MS on baclofen, HTN, HLD, CVA with left hemiparesis , bed bound at baseline was brought in by friend for respiratory distress, admitted to ICU intubated for hypoxic resp failure secondary to influenza.

## 2018-02-17 NOTE — PROGRESS NOTE ADULT - PROBLEM SELECTOR PLAN 2
UA dirty  Started Rocephin  Initial Cx contamination  F/u Repeat Urine Cx UA- positive  will continue with Iv rocephin for UTI

## 2018-02-17 NOTE — PROGRESS NOTE ADULT - PROBLEM SELECTOR PLAN 1
Secondary to RVP: Influenza B  Suspicion of Acute Bronchitis  S/p Extubation 2/15/18  Leukocytosis likley secondary to use of decadron  s/p 5 days of Tamiflu (day 5) in addition to Zithromax and rocephin  D/c Decadron  Started Morphine and Robinul for secretions  F/u Palliative consult (No Re-intubation)  Consulted Dr Wiseman ENT Secondary to RVP: Influenza B  Suspicion of Acute Bronchitis  S/p Extubation 2/15/18  Leukocytosis likley secondary to recent use of decadron   s/p 5 days of Tamiflu (day 5) in addition to Zithromax and rocephin  off decadron  continue with  Morphine and Robinul for secretions  F/u Palliative consult (No Re-intubation)  Consulted Dr Wiseman ENT

## 2018-02-17 NOTE — PROGRESS NOTE ADULT - PROBLEM SELECTOR PLAN 4
PABLO on probable CKD  Unknown baseline PABLO on probable CKD  Unknown baseline  follow up BMP closely.

## 2018-02-17 NOTE — PROGRESS NOTE ADULT - SUBJECTIVE AND OBJECTIVE BOX
INTERVAL /OVERNIGHT EVENTS: No acute events overnight.    PRESSORS: [ ] YES [ x] NO  WHICH:      ANTIBIOTICS: Zithromax             DATE STARTED: Feb 12  ANTIBIOTICS: Rocephin              DATE STARTED: Feb 12    Antimicrobial:  azithromycin  IVPB 500 milliGRAM(s) IV Intermittent every 24 hours  cefTRIAXone   IVPB 1 Gram(s) IV Intermittent every 24 hours  oseltamivir 30 milliGRAM(s) Oral daily    Cardiovascular:  labetalol Injectable 10 milliGRAM(s) IV Push every 8 hours PRN    Pulmonary:  sodium chloride 3%  Inhalation 4 milliLiter(s) Inhalation every 6 hours    Hematalogic:  aspirin  chewable 81 milliGRAM(s) Oral daily  heparin  Injectable 5000 Unit(s) SubCutaneous every 12 hours    Other:  acetaminophen    Suspension 650 milliGRAM(s) Oral every 6 hours PRN  atorvastatin 20 milliGRAM(s) Oral at bedtime  baclofen 10 milliGRAM(s) Oral three times a day  chlorhexidine 4% Liquid 1 Application(s) Topical daily  citalopram 5 milliGRAM(s) Oral at bedtime  donepezil 10 milliGRAM(s) Oral at bedtime  glycopyrrolate Injectable 0.2 milliGRAM(s) IV Push every 6 hours PRN  insulin glargine Injectable (LANTUS) 15 Unit(s) SubCutaneous at bedtime  insulin lispro (HumaLOG) corrective regimen sliding scale   SubCutaneous every 4 hours  insulin lispro Injectable (HumaLOG) 3 Unit(s) SubCutaneous three times a day with meals  levothyroxine 25 MICROGram(s) Oral daily  morphine  - Injectable 0.5 milliGRAM(s) IV Push every 3 hours PRN  pantoprazole  Injectable 40 milliGRAM(s) IV Push daily  raloxifene 60 milliGRAM(s) Oral daily    acetaminophen    Suspension 650 milliGRAM(s) Oral every 6 hours PRN  aspirin  chewable 81 milliGRAM(s) Oral daily  atorvastatin 20 milliGRAM(s) Oral at bedtime  azithromycin  IVPB 500 milliGRAM(s) IV Intermittent every 24 hours  baclofen 10 milliGRAM(s) Oral three times a day  cefTRIAXone   IVPB 1 Gram(s) IV Intermittent every 24 hours  chlorhexidine 4% Liquid 1 Application(s) Topical daily  citalopram 5 milliGRAM(s) Oral at bedtime  donepezil 10 milliGRAM(s) Oral at bedtime  glycopyrrolate Injectable 0.2 milliGRAM(s) IV Push every 6 hours PRN  heparin  Injectable 5000 Unit(s) SubCutaneous every 12 hours  insulin glargine Injectable (LANTUS) 15 Unit(s) SubCutaneous at bedtime  insulin lispro (HumaLOG) corrective regimen sliding scale   SubCutaneous every 4 hours  insulin lispro Injectable (HumaLOG) 3 Unit(s) SubCutaneous three times a day with meals  labetalol Injectable 10 milliGRAM(s) IV Push every 8 hours PRN  levothyroxine 25 MICROGram(s) Oral daily  morphine  - Injectable 0.5 milliGRAM(s) IV Push every 3 hours PRN  oseltamivir 30 milliGRAM(s) Oral daily  pantoprazole  Injectable 40 milliGRAM(s) IV Push daily  raloxifene 60 milliGRAM(s) Oral daily  sodium chloride 3%  Inhalation 4 milliLiter(s) Inhalation every 6 hours    Drug Dosing Weight  Height (cm): 160.02 (12 Feb 2018 08:58)  Weight (kg): 63.5 (12 Feb 2018 08:58)  BMI (kg/m2): 24.8 (12 Feb 2018 08:58)  BSA (m2): 1.66 (12 Feb 2018 08:58)    CENTRAL LINE: [ ] YES [x] NO  LOCATION:   DATE INSERTED:    SANDERS: [x] YES [ ] NO    DATE INSERTED:    A-LINE:  [ ] YES [x] NO  LOCATION:   DATE INSERTED:    PMH -reviewed admission note, no change since admission    ICU Vital Signs Last 24 Hrs  T(C): 36.6 (17 Feb 2018 04:52), Max: 37.1 (16 Feb 2018 15:51)  T(F): 97.8 (17 Feb 2018 04:52), Max: 98.8 (16 Feb 2018 19:36)  HR: 81 (17 Feb 2018 00:00) (80 - 119)  BP: 174/71 (17 Feb 2018 00:00) (149/71 - 195/64)  BP(mean): 101 (17 Feb 2018 00:00) (91 - 142)  ABP: --  ABP(mean): --  RR: 13 (17 Feb 2018 00:00) (13 - 19)  SpO2: 99% (17 Feb 2018 00:00) (90% - 100%)            02-15 @ 07:01  -  02-16 @ 07:00  --------------------------------------------------------  IN: 585.6 mL / OUT: 870 mL / NET: -284.4 mL            PHYSICAL EXAM:            LABS:  CBC Full  -  ( 16 Feb 2018 07:06 )  WBC Count : 17.9 K/uL  Hemoglobin : 9.9 g/dL  Hematocrit : 30.3 %  Platelet Count - Automated : 207 K/uL  Mean Cell Volume : 90.2 fl  Mean Cell Hemoglobin : 29.6 pg  Mean Cell Hemoglobin Concentration : 32.8 gm/dL  Auto Neutrophil # : x  Auto Lymphocyte # : x  Auto Monocyte # : x  Auto Eosinophil # : x  Auto Basophil # : x  Auto Neutrophil % : x  Auto Lymphocyte % : x  Auto Monocyte % : x  Auto Eosinophil % : x  Auto Basophil % : x    02-16    143  |  111<H>  |  64<H>  ----------------------------<  198<H>  4.0   |  18<L>  |  1.98<H>    Ca    8.1<L>      16 Feb 2018 07:06  Phos  3.7     02-16  Mg     2.2     02-16          Culture Results:   >100,000 CFU/ml Escherichia coli (02-15 @ 13:37)      RADIOLOGY & ADDITIONAL STUDIES REVIEWED:     [ ]GOALS OF CARE DISCUSSION WITH PATIENT/FAMILY/PROXY:    CRITICAL CARE TIME SPENT: 35 minutes

## 2018-02-17 NOTE — PROGRESS NOTE ADULT - PROBLEM SELECTOR PLAN 3
EKG: NSR  Mildly elevated trop likely due to demand  No Chest pain on admission  Echo: 55% Ef with normal Lt and Rt ventricle function  Dr De León on board EKG: NSR  Mildly elevated trop likely due to demand  Echo: 55% Ef with normal Lt and Rt ventricle function  Dr De León on board

## 2018-02-17 NOTE — CHART NOTE - NSCHARTNOTEFT_GEN_A_CORE
HPI : 77 F from home, lives with friend PMH of MS, HTN, HLD , MS, CVA with left hemiparesis , bed bound at baseline was brought in by friend for respiratory distress. As per friend, she (herself) had a flu recently and she noticed pt doesn't look right on friday night , called the PCP , recommended to gave cold medication. Pt did not c/o of any pain or SOB but noticed coughing. This morning she was found o be making gurgling noises and secretion/mucus coming out of her mouth. EMS arrived found her to be in respiratory distress with low sPO2 and accessory muscle use , endotracheal intubation was attempted twice with 40 mg of etomidate but failed , oral airway was inserted and ventilation via BMV were continued. HPI : 77 F from home, lives with friend PMH of MS, HTN, HLD , MS, CVA with left hemiparesis , bed bound at baseline was brought in by friend for respiratory distress. As per friend, she (herself) had a flu recently and she noticed pt doesn't look right on friday night , called the PCP , recommended to gave cold medication. Pt did not c/o of any pain or SOB but noticed coughing. This morning she was found o be making gurgling noises and secretion/mucus coming out of her mouth. EMS arrived found her to be in respiratory distress with low sPO2 and accessory muscle use , endotracheal intubation was attempted twice with 40 mg of etomidate but failed , oral airway was inserted and ventilation via BMV were continued.    Patient was admitted to ICU intubated for hypoxic resp failure secondary to influenza.     Problem/Plan - 1:  ·  Problem: Acute respiratory failure.  Plan: Secondary to RVP: Influenza B  Suspicion of Acute Bronchitis initially , completed course of Zithromax   S/p Extubation 2/15/18   Leukocytosis likely secondary to recent use of decadron   s/p 5 days of Tamiflu   off decadron  continue with  Morphine and Robinul for secretions  F/u Palliative consult (No Re-intubation)     Problem/Plan - 2:  ·  Problem: UTI (urinary tract infection).  Plan: UA- positive  will continue with Iv rocephin for UTI  Follow up urine cultures       Problem/Plan - 3:  ·  Problem: Troponin level elevated.  Plan: EKG: NSR  Mildly elevated trop likely due to demand  Echo: 55% Ef with normal Lt and Rt ventricle function  Dr De León on board.      Problem/Plan - 4:  ·  Problem: PABLO (acute kidney injury).  Plan: PABLO on probable CKD  Unknown baseline  follow up BMP closely.      Problem/Plan - 5:  ·  Problem: MS (multiple sclerosis).  Plan: C/w home baclofen, Citalopram and donepezil   Pt needs MRI gadolinium scan when stable  Dr Yan consulted.      Problem/Plan - 6:  Problem: Hypertension. Plan: Holding home losartan 50 mg as patient is unable to swallow  Started Labetalol pushes prn.  Please get official speech and swallow and restart oral meds as tolerated.     Problem/Plan - 7:  ·  Problem: Hypothyroidism.  Plan: Continue with home dose of levothyroxine   Normal TSH 1.12.      Problem/Plan - 8:  ·  Problem: Diabetes mellitus.  Plan: Hold PO antihyperglycemic  Continue with HSS   5.7 HbA1c.      Problem/Plan - 9:  ·  Problem: CVA (cerebral vascular accident).  Plan: Patient has hx of CVA with left hemiparesis but only takes Lipitor 20 mg (not on aspirin)   C/w ASA, Statin.      Problem/Plan - 10:  Problem: Prophylactic measure. Plan; DVT ppx with Hep sq  C/w Protonix for GI ppx.      Things to follow :    1. Official speech and swallow evaluation  2. Follow up urine cultures  3. Follow up with palliative care team as family interested in hospice. HPI : 77 F from home, lives with friend PMH of MS, HTN, HLD , MS, CVA with left hemiparesis , bed bound at baseline was brought in by friend for respiratory distress. As per friend, she (herself) had a flu recently and she noticed pt doesn't look right on friday night , called the PCP , recommended to gave cold medication. Pt did not c/o of any pain or SOB but noticed coughing. This morning she was found o be making gurgling noises and secretion/mucus coming out of her mouth. EMS arrived found her to be in respiratory distress with low sPO2 and accessory muscle use , endotracheal intubation was attempted twice with 40 mg of etomidate but failed , oral airway was inserted and ventilation via BMV were continued.    Patient was admitted to ICU intubated for hypoxic resp failure secondary to influenza.     Problem/Plan - 1:  ·  Problem: Acute respiratory failure.  Plan: Secondary to RVP: Influenza B  Suspicion of Acute Bronchitis initially , completed course of Zithromax   S/p Extubation 2/15/18   Leukocytosis likely secondary to recent use of decadron   s/p 5 days of Tamiflu   off decadron  continue with  Morphine and Robinul for secretions  F/u Palliative consult (No Re-intubation)     Problem/Plan - 2:  ·  Problem: UTI (urinary tract infection).  Plan: UA- positive  will continue with Iv rocephin for UTI  Follow up urine cultures       Problem/Plan - 3:  ·  Problem: Troponin level elevated.  Plan: EKG: NSR  Mildly elevated trop likely due to demand  Echo: 55% Ef with normal Lt and Rt ventricle function  Dr De León on board.      Problem/Plan - 4:  ·  Problem: PABLO (acute kidney injury).  Plan: PABLO on probable CKD  Unknown baseline  Creatinine improving however   follow up BMP closely.      Problem/Plan - 5:  ·  Problem: MS (multiple sclerosis).  Plan: C/w home baclofen, Citalopram and donepezil   Pt needs MRI gadolinium scan when stable  Dr Yan consulted.      Problem/Plan - 6:  Problem: Hypertension. Plan: Restarted home losartan 50 daily   Started Labetalol pushes prn.  Please get official speech and swallow and restart oral meds as tolerated.     Problem/Plan - 7:  ·  Problem: Hypothyroidism.  Plan: Continue with home dose of levothyroxine   Normal TSH 1.12.      Problem/Plan - 8:  ·  Problem: Diabetes mellitus.  Plan: Hold PO antihyperglycemic  Continue with HSS   5.7 HbA1c.      Problem/Plan - 9:  ·  Problem: CVA (cerebral vascular accident).  Plan: Patient has hx of CVA with left hemiparesis but only takes Lipitor 20 mg (not on aspirin)   C/w ASA, Statin.      Problem/Plan - 10:  Problem: Prophylactic measure. Plan; DVT ppx with Hep sq  C/w Protonix for GI ppx.      Things to follow :    1. Official speech and swallow evaluation , passed  bedside speech and swallow with apple sauce   2. Follow up urine cultures  3. Follow up with palliative care team as family interested in hospice.  4 . Monitor BP

## 2018-02-17 NOTE — PROGRESS NOTE ADULT - ATTENDING COMMENTS
77 female with hx of MS, CVA with residual left hemiparesis, bedbound, admitted with acute respiratory failure due to influenza B and pneumonia.  Remains extubated but she has difficulty clearing secretions.  DNR/DNI.  HCP is interested in hospice.  Plan:  - continue tamiflu  - nebs, suctioning  - clonidine patch for hypertension   - swallow eval - would like to start feeds  - palliative care consult

## 2018-02-18 DIAGNOSIS — E87.0 HYPEROSMOLALITY AND HYPERNATREMIA: ICD-10-CM

## 2018-02-18 DIAGNOSIS — N39.0 URINARY TRACT INFECTION, SITE NOT SPECIFIED: ICD-10-CM

## 2018-02-18 DIAGNOSIS — N17.9 ACUTE KIDNEY FAILURE, UNSPECIFIED: ICD-10-CM

## 2018-02-18 LAB
-  AMIKACIN: SIGNIFICANT CHANGE UP
-  AMPICILLIN/SULBACTAM: SIGNIFICANT CHANGE UP
-  AMPICILLIN: SIGNIFICANT CHANGE UP
-  AZTREONAM: SIGNIFICANT CHANGE UP
-  CEFAZOLIN: SIGNIFICANT CHANGE UP
-  CEFEPIME: SIGNIFICANT CHANGE UP
-  CEFOXITIN: SIGNIFICANT CHANGE UP
-  CEFTAZIDIME: SIGNIFICANT CHANGE UP
-  CEFTRIAXONE: SIGNIFICANT CHANGE UP
-  CIPROFLOXACIN: SIGNIFICANT CHANGE UP
-  ERTAPENEM: SIGNIFICANT CHANGE UP
-  GENTAMICIN: SIGNIFICANT CHANGE UP
-  IMIPENEM: SIGNIFICANT CHANGE UP
-  LEVOFLOXACIN: SIGNIFICANT CHANGE UP
-  MEROPENEM: SIGNIFICANT CHANGE UP
-  NITROFURANTOIN: SIGNIFICANT CHANGE UP
-  PIPERACILLIN/TAZOBACTAM: SIGNIFICANT CHANGE UP
-  TOBRAMYCIN: SIGNIFICANT CHANGE UP
-  TRIMETHOPRIM/SULFAMETHOXAZOLE: SIGNIFICANT CHANGE UP
ANION GAP SERPL CALC-SCNC: 9 MMOL/L — SIGNIFICANT CHANGE UP (ref 5–17)
BASOPHILS # BLD AUTO: 0 K/UL — SIGNIFICANT CHANGE UP (ref 0–0.2)
BASOPHILS NFR BLD AUTO: 0.4 % — SIGNIFICANT CHANGE UP (ref 0–2)
BUN SERPL-MCNC: 30 MG/DL — HIGH (ref 7–18)
CALCIUM SERPL-MCNC: 8.1 MG/DL — LOW (ref 8.4–10.5)
CHLORIDE SERPL-SCNC: 112 MMOL/L — HIGH (ref 96–108)
CO2 SERPL-SCNC: 25 MMOL/L — SIGNIFICANT CHANGE UP (ref 22–31)
CREAT SERPL-MCNC: 1.42 MG/DL — HIGH (ref 0.5–1.3)
CULTURE RESULTS: SIGNIFICANT CHANGE UP
EOSINOPHIL # BLD AUTO: 0.1 K/UL — SIGNIFICANT CHANGE UP (ref 0–0.5)
EOSINOPHIL NFR BLD AUTO: 1.5 % — SIGNIFICANT CHANGE UP (ref 0–6)
GLUCOSE BLDC GLUCOMTR-MCNC: 114 MG/DL — HIGH (ref 70–99)
GLUCOSE BLDC GLUCOMTR-MCNC: 141 MG/DL — HIGH (ref 70–99)
GLUCOSE BLDC GLUCOMTR-MCNC: 158 MG/DL — HIGH (ref 70–99)
GLUCOSE BLDC GLUCOMTR-MCNC: 237 MG/DL — HIGH (ref 70–99)
GLUCOSE SERPL-MCNC: 106 MG/DL — HIGH (ref 70–99)
HCT VFR BLD CALC: 32 % — LOW (ref 34.5–45)
HGB BLD-MCNC: 10.1 G/DL — LOW (ref 11.5–15.5)
LYMPHOCYTES # BLD AUTO: 1.9 K/UL — SIGNIFICANT CHANGE UP (ref 1–3.3)
LYMPHOCYTES # BLD AUTO: 22.5 % — SIGNIFICANT CHANGE UP (ref 13–44)
MCHC RBC-ENTMCNC: 28.8 PG — SIGNIFICANT CHANGE UP (ref 27–34)
MCHC RBC-ENTMCNC: 31.6 GM/DL — LOW (ref 32–36)
MCV RBC AUTO: 91.4 FL — SIGNIFICANT CHANGE UP (ref 80–100)
METHOD TYPE: SIGNIFICANT CHANGE UP
MONOCYTES # BLD AUTO: 0.7 K/UL — SIGNIFICANT CHANGE UP (ref 0–0.9)
MONOCYTES NFR BLD AUTO: 8.7 % — SIGNIFICANT CHANGE UP (ref 2–14)
NEUTROPHILS # BLD AUTO: 5.5 K/UL — SIGNIFICANT CHANGE UP (ref 1.8–7.4)
NEUTROPHILS NFR BLD AUTO: 66.8 % — SIGNIFICANT CHANGE UP (ref 43–77)
ORGANISM # SPEC MICROSCOPIC CNT: SIGNIFICANT CHANGE UP
ORGANISM # SPEC MICROSCOPIC CNT: SIGNIFICANT CHANGE UP
PLATELET # BLD AUTO: 281 K/UL — SIGNIFICANT CHANGE UP (ref 150–400)
POTASSIUM SERPL-MCNC: 3.2 MMOL/L — LOW (ref 3.5–5.3)
POTASSIUM SERPL-SCNC: 3.2 MMOL/L — LOW (ref 3.5–5.3)
RBC # BLD: 3.5 M/UL — LOW (ref 3.8–5.2)
RBC # FLD: 13.5 % — SIGNIFICANT CHANGE UP (ref 10.3–14.5)
SODIUM SERPL-SCNC: 146 MMOL/L — HIGH (ref 135–145)
SPECIMEN SOURCE: SIGNIFICANT CHANGE UP
WBC # BLD: 8.3 K/UL — SIGNIFICANT CHANGE UP (ref 3.8–10.5)
WBC # FLD AUTO: 8.3 K/UL — SIGNIFICANT CHANGE UP (ref 3.8–10.5)

## 2018-02-18 RX ORDER — SODIUM CHLORIDE 9 MG/ML
1000 INJECTION, SOLUTION INTRAVENOUS
Qty: 0 | Refills: 0 | Status: DISCONTINUED | OUTPATIENT
Start: 2018-02-18 | End: 2018-02-19

## 2018-02-18 RX ORDER — POTASSIUM CHLORIDE 20 MEQ
40 PACKET (EA) ORAL ONCE
Qty: 0 | Refills: 0 | Status: COMPLETED | OUTPATIENT
Start: 2018-02-18 | End: 2018-02-18

## 2018-02-18 RX ORDER — POTASSIUM CHLORIDE 20 MEQ
40 PACKET (EA) ORAL ONCE
Qty: 0 | Refills: 0 | Status: DISCONTINUED | OUTPATIENT
Start: 2018-02-18 | End: 2018-02-18

## 2018-02-18 RX ADMIN — Medication 81 MILLIGRAM(S): at 12:26

## 2018-02-18 RX ADMIN — CITALOPRAM 5 MILLIGRAM(S): 10 TABLET, FILM COATED ORAL at 21:54

## 2018-02-18 RX ADMIN — DONEPEZIL HYDROCHLORIDE 10 MILLIGRAM(S): 10 TABLET, FILM COATED ORAL at 21:54

## 2018-02-18 RX ADMIN — HEPARIN SODIUM 5000 UNIT(S): 5000 INJECTION INTRAVENOUS; SUBCUTANEOUS at 17:30

## 2018-02-18 RX ADMIN — Medication 10 MILLIGRAM(S): at 13:52

## 2018-02-18 RX ADMIN — RALOXIFENE HYDROCHLORIDE 60 MILLIGRAM(S): 60 TABLET, COATED ORAL at 12:25

## 2018-02-18 RX ADMIN — CARVEDILOL PHOSPHATE 3.12 MILLIGRAM(S): 80 CAPSULE, EXTENDED RELEASE ORAL at 05:36

## 2018-02-18 RX ADMIN — Medication 10 MILLIGRAM(S): at 21:54

## 2018-02-18 RX ADMIN — Medication 25 MICROGRAM(S): at 05:36

## 2018-02-18 RX ADMIN — CHLORHEXIDINE GLUCONATE 1 APPLICATION(S): 213 SOLUTION TOPICAL at 12:27

## 2018-02-18 RX ADMIN — Medication 10 MILLIGRAM(S): at 05:36

## 2018-02-18 RX ADMIN — CARVEDILOL PHOSPHATE 3.12 MILLIGRAM(S): 80 CAPSULE, EXTENDED RELEASE ORAL at 17:30

## 2018-02-18 RX ADMIN — Medication 1 PATCH: at 08:44

## 2018-02-18 RX ADMIN — SODIUM CHLORIDE 4 MILLILITER(S): 9 INJECTION INTRAMUSCULAR; INTRAVENOUS; SUBCUTANEOUS at 09:24

## 2018-02-18 RX ADMIN — ATORVASTATIN CALCIUM 20 MILLIGRAM(S): 80 TABLET, FILM COATED ORAL at 21:54

## 2018-02-18 RX ADMIN — Medication 1: at 17:28

## 2018-02-18 RX ADMIN — NYSTATIN CREAM 1 APPLICATION(S): 100000 CREAM TOPICAL at 12:26

## 2018-02-18 RX ADMIN — SODIUM CHLORIDE 4 MILLILITER(S): 9 INJECTION INTRAMUSCULAR; INTRAVENOUS; SUBCUTANEOUS at 15:16

## 2018-02-18 RX ADMIN — CEFTRIAXONE 100 GRAM(S): 500 INJECTION, POWDER, FOR SOLUTION INTRAMUSCULAR; INTRAVENOUS at 17:30

## 2018-02-18 RX ADMIN — SODIUM CHLORIDE 50 MILLILITER(S): 9 INJECTION, SOLUTION INTRAVENOUS at 21:54

## 2018-02-18 RX ADMIN — Medication 2: at 21:56

## 2018-02-18 RX ADMIN — SODIUM CHLORIDE 4 MILLILITER(S): 9 INJECTION INTRAMUSCULAR; INTRAVENOUS; SUBCUTANEOUS at 02:32

## 2018-02-18 RX ADMIN — SODIUM CHLORIDE 4 MILLILITER(S): 9 INJECTION INTRAMUSCULAR; INTRAVENOUS; SUBCUTANEOUS at 21:05

## 2018-02-18 RX ADMIN — HEPARIN SODIUM 5000 UNIT(S): 5000 INJECTION INTRAVENOUS; SUBCUTANEOUS at 05:37

## 2018-02-18 RX ADMIN — Medication 40 MILLIEQUIVALENT(S): at 21:54

## 2018-02-18 RX ADMIN — LOSARTAN POTASSIUM 50 MILLIGRAM(S): 100 TABLET, FILM COATED ORAL at 05:37

## 2018-02-18 RX ADMIN — PANTOPRAZOLE SODIUM 40 MILLIGRAM(S): 20 TABLET, DELAYED RELEASE ORAL at 12:25

## 2018-02-18 NOTE — CONSULT NOTE ADULT - SUBJECTIVE AND OBJECTIVE BOX
A 77 F from home, lives with friend [facility] of [place], HTN, HLD , MS, CVA with left hemiparesis , was brought in to the ER on 2/12/18,by friend for respiratory distress.  EMS arrived found her to be in respiratory distress with low sPO2 and accessory muscle use , endotracheal intubation was attempted twice with 40 mg of etomidate but failed , oral airway was inserted and ventilation via BMV were continued. She found to have positive Influenza B and Pneumonia. She has completed the course of Influenza B and on Ceftriaxone for Pneumonia. On admission urine culture grew ESBL  E.coli in the setting of negative Urine analysis. Hence, Ceftriaxone change to Levaquin base don C & S, and The ID consult requested to assist with further antibiotic management.        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  Diabetes mellitus  Hypertension  Hypothyroidism  Dyslipidemia          SOCIAL HISTORY  Alcohol: Does not drink  Tobacco: Does not smoke  Illicit substance use: None          FAMILY HISTORY: Non contributory to the present illness          ALLERGIES: NKDA        T(C): 37.1 (02-18-18 @ 15:38), Max: 37.1 (02-18-18 @ 15:38)  HR: 99 (02-18-18 @ 15:38) (94 - 101)  BP: 148/73 (02-18-18 @ 15:38) (138/81 - 182/79)  RR: 18 (02-18-18 @ 15:38) (17 - 18)  SpO2: 100% (02-18-18 @ 15:38) (95% - 100%)  Wt(kg): --  I&O's Summary          PHYSICAL EXAM:  GENERAL:   CVS:  RESP:  GI:  EXT:  CNS:          LABS:                        10.1   8.3   )-----------( 281      ( 18 Feb 2018 05:59 )             32.0     02-18    146<H>  |  112<H>  |  30<H>  ----------------------------<  106<H>  3.2<L>   |  25  |  1.42<H>    Ca    8.1<L>      18 Feb 2018 05:59  Phos  3.0     02-17  Mg     2.1     02-17          CAPILLARY BLOOD GLUCOSE      POCT Blood Glucose.: 158 mg/dL (18 Feb 2018 17:17)  POCT Blood Glucose.: 141 mg/dL (18 Feb 2018 11:46)  POCT Blood Glucose.: 114 mg/dL (18 Feb 2018 08:34)  POCT Blood Glucose.: 119 mg/dL (17 Feb 2018 22:09)            MEDICATIONS  (STANDING):  aspirin  chewable 81 milliGRAM(s) Oral daily  atorvastatin 20 milliGRAM(s) Oral at bedtime  baclofen 10 milliGRAM(s) Oral three times a day  carvedilol 3.125 milliGRAM(s) Oral every 12 hours  cefTRIAXone   IVPB 1 Gram(s) IV Intermittent every 24 hours  chlorhexidine 4% Liquid 1 Application(s) Topical daily  citalopram 5 milliGRAM(s) Oral at bedtime  cloNIDine Patch 0.1 mG/24Hr(s) 1 patch Topical <User Schedule>  donepezil 10 milliGRAM(s) Oral at bedtime  heparin  Injectable 5000 Unit(s) SubCutaneous every 12 hours  insulin lispro (HumaLOG) corrective regimen sliding scale   SubCutaneous Before meals and at bedtime  levoFLOXacin IVPB      levoFLOXacin IVPB 250 milliGRAM(s) IV Intermittent once  levothyroxine 25 MICROGram(s) Oral daily  losartan 50 milliGRAM(s) Oral daily  nystatin Powder 1 Application(s) Topical daily  pantoprazole  Injectable 40 milliGRAM(s) IV Push daily  potassium chloride    Tablet ER 40 milliEquivalent(s) Oral once  raloxifene 60 milliGRAM(s) Oral daily  sodium chloride 0.45%. 1000 milliLiter(s) (50 mL/Hr) IV Continuous <Continuous>  sodium chloride 3%  Inhalation 4 milliLiter(s) Inhalation every 6 hours    MEDICATIONS  (PRN):  acetaminophen    Suspension 650 milliGRAM(s) Oral every 6 hours PRN For Temp greater than 38 C (100.4 F)  glycopyrrolate Injectable 0.2 milliGRAM(s) IV Push every 6 hours PRN Secretions  labetalol Injectable 10 milliGRAM(s) IV Push every 8 hours PRN Systolic/Diastolic >  morphine  - Injectable 0.5 milliGRAM(s) IV Push every 3 hours PRN Dyspnea          RADIOLOGY & ADDITIONAL TESTS:    < from: Xray Chest 1 View- PORTABLE-Routine (02.16.18 @ 07:10) >    Poor inspiration crowds the chest. The heart is magnified by technique.    Endotracheal tube and nasogastric tube seen on February 15 have been   removed.    There are mild basilar effusions possibly with some adjacent atelectasis   but this has shown significant improvementparticularly on the left   compared to February 15.      < end of copied text >      < from: Xray Chest 1 View- PORTABLE-Routine (02.15.18 @ 11:04) >  ET and enteric tubes remain in place. No pneumothorax.    Bibasilar airspace opacities, left worst than right, similar to the prior   study. Possible trace left pleural effusion.     The cardiac silhouette is within normal limits in size.        MICROBIOLOGY DATA:    Culture - Urine (02.15.18 @ 13:37)    -  Amikacin: S <=8    -  Ampicillin: R >16    -  Ampicillin/Sulbactam: R >16/8    -  Aztreonam: R >16    -  Cefazolin: R >16    -  Cefepime: R >16    -  Cefoxitin: R >16    -  Ceftazidime: R >16    -  Ceftriaxone: R >32    -  Ciprofloxacin: S <=0.5    -  Ertapenem: S <=0.5    -  Gentamicin: S 2    -  Imipenem: S <=1    -  Levofloxacin: S <=1    -  Meropenem: S <=1    -  Nitrofurantoin: S <=32    -  Piperacillin/Tazobactam: R <=8    -  Tobramycin: S <=2    -  Trimethoprim/Sulfamethoxazole: R >2/38    Specimen Source: .Urine Clean Catch (Midstream)    Culture Results:   >100,000 CFU/ml Escherichia coli ESBL    Organism Identification: Escherichia coli ESBL    Organism: Escherichia coli ESBL    Method Type: AJ A 77 F from home, lives with friend PM of MS, HTN, HLD , MS, CVA with left hemiparesis , was brought in to the ER on 2/12/18,by friend for respiratory distress.  EMS arrived found her to be in respiratory distress with low sPO2 and accessory muscle use , endotracheal intubation was attempted twice with 40 mg of etomidate but failed , oral airway was inserted and ventilation via BMV were continued. She found to have positive Influenza B and Pneumonia. She has completed the course of Influenza B and on Ceftriaxone for Pneumonia. On admission urine culture grew ESBL  E.coli in the setting of negative Urine analysis. Hence, Ceftriaxone change to Levaquin base don C & S, and The ID consult requested to assist with further antibiotic management.          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  Diabetes mellitus  Hypertension  Hypothyroidism  Dyslipidemia          SOCIAL HISTORY  Alcohol: Does not drink  Tobacco: Does not smoke  Illicit substance use: None          FAMILY HISTORY: Non contributory to the present illness          ALLERGIES: NKDA        T(C): 37.1 (02-18-18 @ 15:38), Max: 37.1 (02-18-18 @ 15:38)  HR: 99 (02-18-18 @ 15:38) (94 - 101)  BP: 148/73 (02-18-18 @ 15:38) (138/81 - 182/79)  RR: 18 (02-18-18 @ 15:38) (17 - 18)  SpO2: 100% (02-18-18 @ 15:38) (95% - 100%)  Wt(kg): --  I&O's Summary          PHYSICAL EXAM:  GENERAL: Not in distress  CVS: s1 and s2 present   RESP: Air entry B/L  GI: Abdomen soft and nontender  EXT: No pedal edema  CNS:  Awake and alert          LABS:                        10.1   8.3   )-----------( 281      ( 18 Feb 2018 05:59 )             32.0         02-18    146<H>  |  112<H>  |  30<H>  ----------------------------<  106<H>  3.2<L>   |  25  |  1.42<H>    Ca    8.1<L>      18 Feb 2018 05:59  Phos  3.0     02-17  Mg     2.1     02-17          CAPILLARY BLOOD GLUCOSE      POCT Blood Glucose.: 158 mg/dL (18 Feb 2018 17:17)  POCT Blood Glucose.: 141 mg/dL (18 Feb 2018 11:46)  POCT Blood Glucose.: 114 mg/dL (18 Feb 2018 08:34)  POCT Blood Glucose.: 119 mg/dL (17 Feb 2018 22:09)            MEDICATIONS  (STANDING):  aspirin  chewable 81 milliGRAM(s) Oral daily  atorvastatin 20 milliGRAM(s) Oral at bedtime  baclofen 10 milliGRAM(s) Oral three times a day  carvedilol 3.125 milliGRAM(s) Oral every 12 hours  cefTRIAXone   IVPB 1 Gram(s) IV Intermittent every 24 hours  chlorhexidine 4% Liquid 1 Application(s) Topical daily  citalopram 5 milliGRAM(s) Oral at bedtime  cloNIDine Patch 0.1 mG/24Hr(s) 1 patch Topical <User Schedule>  donepezil 10 milliGRAM(s) Oral at bedtime  heparin  Injectable 5000 Unit(s) SubCutaneous every 12 hours  insulin lispro (HumaLOG) corrective regimen sliding scale   SubCutaneous Before meals and at bedtime  levoFLOXacin IVPB      levoFLOXacin IVPB 250 milliGRAM(s) IV Intermittent once  levothyroxine 25 MICROGram(s) Oral daily  losartan 50 milliGRAM(s) Oral daily  nystatin Powder 1 Application(s) Topical daily  pantoprazole  Injectable 40 milliGRAM(s) IV Push daily  potassium chloride    Tablet ER 40 milliEquivalent(s) Oral once  raloxifene 60 milliGRAM(s) Oral daily  sodium chloride 0.45%. 1000 milliLiter(s) (50 mL/Hr) IV Continuous <Continuous>  sodium chloride 3%  Inhalation 4 milliLiter(s) Inhalation every 6 hours    MEDICATIONS  (PRN):  acetaminophen    Suspension 650 milliGRAM(s) Oral every 6 hours PRN For Temp greater than 38 C (100.4 F)  glycopyrrolate Injectable 0.2 milliGRAM(s) IV Push every 6 hours PRN Secretions  labetalol Injectable 10 milliGRAM(s) IV Push every 8 hours PRN Systolic/Diastolic >  morphine  - Injectable 0.5 milliGRAM(s) IV Push every 3 hours PRN Dyspnea          RADIOLOGY & ADDITIONAL TESTS:    2/16/18: Xray Chest 1 View- PORTABLE-Routine (02.16.18 @ 07:10) : Poor inspiration crowds the chest. The heart is magnified by technique. Endotracheal tube and nasogastric tube seen on February 15 have been   removed. There are mild basilar effusions possibly with some adjacent atelectasis but this has shown significant improvementparticularly on the left compared to February 15.        2/15/18: Xray Chest 1 View- PORTABLE-Routine (02.15.18 @ 11:04) : ET and enteric tubes remain in place. No pneumothorax. Bibasilar airspace opacities, left worst than right, similar to the prior  study. Possible trace left pleural effusion. The cardiac silhouette is within normal limits in size.          MICROBIOLOGY DATA:    Culture - Urine (02.15.18 @ 13:37)    -  Amikacin: S <=8    -  Ampicillin: R >16    -  Ampicillin/Sulbactam: R >16/8    -  Aztreonam: R >16    -  Cefazolin: R >16    -  Cefepime: R >16    -  Cefoxitin: R >16    -  Ceftazidime: R >16    -  Ceftriaxone: R >32    -  Ciprofloxacin: S <=0.5    -  Ertapenem: S <=0.5    -  Gentamicin: S 2    -  Imipenem: S <=1    -  Levofloxacin: S <=1    -  Meropenem: S <=1    -  Nitrofurantoin: S <=32    -  Piperacillin/Tazobactam: R <=8    -  Tobramycin: S <=2    -  Trimethoprim/Sulfamethoxazole: R >2/38    Specimen Source: .Urine Clean Catch (Midstream)    Culture Results:   >100,000 CFU/ml Escherichia coli ESBL    Organism Identification: Escherichia coli ESBL    Organism: Escherichia coli ESBL    Method Type: AJ

## 2018-02-18 NOTE — CONSULT NOTE ADULT - SUBJECTIVE AND OBJECTIVE BOX
IM Attending Covering Medicine For    **  will resume care tmrw 2/19/18 as IM Attending     77y old  Female who presents with a chief complaint of Respiratory failure (12 Feb 2018 10:41)    downgraded from the icu yesterday 2/17/18         PAST MEDICAL & SURGICAL HISTORY:  MS (multiple sclerosis)  CVA (cerebral vascular accident)  Diabetes mellitus  Hypertension  Hypothyroidism      MEDICATIONS  (STANDING):  aspirin  chewable 81 milliGRAM(s) Oral daily  atorvastatin 20 milliGRAM(s) Oral at bedtime  baclofen 10 milliGRAM(s) Oral three times a day  carvedilol 3.125 milliGRAM(s) Oral every 12 hours  cefTRIAXone   IVPB 1 Gram(s) IV Intermittent every 24 hours  chlorhexidine 4% Liquid 1 Application(s) Topical daily  citalopram 5 milliGRAM(s) Oral at bedtime  cloNIDine Patch 0.1 mG/24Hr(s) 1 patch Topical <User Schedule>  donepezil 10 milliGRAM(s) Oral at bedtime  heparin  Injectable 5000 Unit(s) SubCutaneous every 12 hours  insulin lispro (HumaLOG) corrective regimen sliding scale   SubCutaneous Before meals and at bedtime  levoFLOXacin IVPB      levoFLOXacin IVPB 250 milliGRAM(s) IV Intermittent once  levothyroxine 25 MICROGram(s) Oral daily  losartan 50 milliGRAM(s) Oral daily  nystatin Powder 1 Application(s) Topical daily  pantoprazole  Injectable 40 milliGRAM(s) IV Push daily  potassium chloride    Tablet ER 40 milliEquivalent(s) Oral once  raloxifene 60 milliGRAM(s) Oral daily  sodium chloride 0.45%. 1000 milliLiter(s) (50 mL/Hr) IV Continuous <Continuous>  sodium chloride 3%  Inhalation 4 milliLiter(s) Inhalation every 6 hours    MEDICATIONS  (PRN):  acetaminophen    Suspension 650 milliGRAM(s) Oral every 6 hours PRN For Temp greater than 38 C (100.4 F)  glycopyrrolate Injectable 0.2 milliGRAM(s) IV Push every 6 hours PRN Secretions  labetalol Injectable 10 milliGRAM(s) IV Push every 8 hours PRN Systolic/Diastolic >  morphine  - Injectable 0.5 milliGRAM(s) IV Push every 3 hours PRN Dyspnea        REVIEW OF SYSTEMS: limited  CONSTITUTIONAL: (+) Malaise  RESPIRATORY: No hemoptysis  CARDIOVASCULAR: No chest pain  GASTROINTESTINAL: No hematemesis, diarrhea,  GENITOURINARY: No  hematuria  NEUROLOGICAL: No headaches  LYMPH NODES: No enlarged glands    Vital Signs Last 24 Hrs  T(C): 37.1 (18 Feb 2018 15:38), Max: 37.1 (18 Feb 2018 15:38)  T(F): 98.8 (18 Feb 2018 15:38), Max: 98.8 (18 Feb 2018 15:38)  HR: 99 (18 Feb 2018 15:38) (94 - 101)  BP: 148/73 (18 Feb 2018 15:38) (138/81 - 182/79)  BP(mean): --  RR: 18 (18 Feb 2018 15:38) (17 - 18)  SpO2: 100% (18 Feb 2018 15:38) (95% - 100%)    Constitutional: NAD  HEENT: NC, AT  Neck:  Supple.   Respiratory:  adequate airflow b/l. Not using accessory muscles of respiration.   Cardiovascular: s1 and s2 intact. No R/G. 2+ radial pulses b/l.   Gastrointestinal:  No organomegaly, rigidity, or RT.  Extremities: WWP.  Neurological:  Crude sensation intact.      LABS:                        10.1   8.3   )-----------( 281      ( 18 Feb 2018 05:59 )             32.0     02-18    146<H>  |  112<H>  |  30<H>  ----------------------------<  106<H>  3.2<L>   |  25  |  1.42<H>    Ca    8.1<L>      18 Feb 2018 05:59  Phos  3.0     02-17  Mg     2.1     02-17      CAPILLARY BLOOD GLUCOSE  POCT Blood Glucose.: 158 mg/dL (18 Feb 2018 17:17)  POCT Blood Glucose.: 141 mg/dL (18 Feb 2018 11:46)  POCT Blood Glucose.: 114 mg/dL (18 Feb 2018 08:34)  POCT Blood Glucose.: 119 mg/dL (17 Feb 2018 22:09)    RADIOLOGY & ADDITIONAL STUDIES:  < from: Xray Chest 1 View- PORTABLE-Routine (02.16.18 @ 07:10) >    EXAM:  XR CHEST PORTABLE ROUTINE 1V                            PROCEDURE DATE:  02/16/2018          INTERPRETATION:  AP chest on February 16, 2018 at 6:29 AM. Patient is   extubated after respiratory failure and hypoxia.    Poor inspiration crowds the chest. The heart is magnified by technique.    Endotracheal tube and nasogastric tube seen on February 15 have been   removed.    There are mild basilar effusions possibly with some adjacent atelectasis   but this has shown significant improvementparticularly on the left   compared to February 15.    IMPRESSION: Improved basilar findings. Tubes removed.      < end of copied text >

## 2018-02-18 NOTE — CONSULT NOTE ADULT - ASSESSMENT
A 77 F from home, lives with friend PMH of MS, HTN, HLD , MS, CVA with left hemiparesis , was brought in to the ER on 2/12/18,by friend for respiratory distress.  EMS arrived found her to be in respiratory distress with low sPO2 and accessory muscle use , endotracheal intubation was attempted twice with 40 mg of etomidate but failed , oral airway was inserted and ventilation via BMV were continued. She found to have positive Influenza B and Pneumonia. She has completed the course of Influenza B and on Ceftriaxone for Pneumonia. On admission urine culture grew ESBL  E.coli in the setting of negative Urine analysis. Hence, Ceftriaxone change to Levaquin base don C & S, and The ID consult requested to assist with further antibiotic management.    # Influenza B - s/p tamiflu  # s/p Respiratory failure- s/p extubation  # Pneumonia- resolving  # ESBL  E.coli in Urine culture- In the setting of Negative Urine analysis    Would recommend:  1. Continue Levaquin until 2/22/18 to complete the course of Pneumonia  2. Urine culture most likely a contaminant in the setting of Negative Urine analysis  3. Repeat Urine analysis  4. Aspiration precaution    will follow the patient with you and make further recommendation based on the clinical course and Lab results  Thank you for the opportunity to participate in Ms. Aylin powell A 77 F from home, lives with friend PMH of MS, HTN, HLD , MS, CVA with left hemiparesis , was brought in to the ER on 2/12/18,by friend for respiratory distress.  EMS arrived found her to be in respiratory distress with low sPO2 and accessory muscle use , endotracheal intubation was attempted twice with 40 mg of etomidate but failed , oral airway was inserted and ventilation via BMV were continued. She found to have positive Influenza B and Pneumonia. She has completed the course of Influenza B and on Ceftriaxone for Pneumonia. On admission urine culture grew ESBL  E.coli in the setting of negative Urine analysis. Hence, Ceftriaxone change to Levaquin base don C & S, and The ID consult requested to assist with further antibiotic management.    # Influenza B - s/p tamiflu  # s/p Respiratory failure- s/p extubation  # Pneumonia- resolving  # ESBL  E.coli in Urine culture- In the setting of Negative Urine analysis    Would recommend:  1. Continue Levaquin until 2/22/18 to complete the course of Pneumonia  2. Urine culture most likely a contaminant in the setting of Negative Urine analysis  3. Repeat Urine analysis  4. Aspiration precaution    d/w Patient and Nursing  staff    will follow the patient with you and make further recommendation based on the clinical course and Lab results  Thank you for the opportunity to participate in Ms. Willis's care

## 2018-02-19 DIAGNOSIS — Z51.5 ENCOUNTER FOR PALLIATIVE CARE: ICD-10-CM

## 2018-02-19 DIAGNOSIS — I10 ESSENTIAL (PRIMARY) HYPERTENSION: ICD-10-CM

## 2018-02-19 LAB
24R-OH-CALCIDIOL SERPL-MCNC: 36.3 NG/ML — SIGNIFICANT CHANGE UP (ref 30–80)
ANION GAP SERPL CALC-SCNC: 6 MMOL/L — SIGNIFICANT CHANGE UP (ref 5–17)
APPEARANCE UR: CLEAR — SIGNIFICANT CHANGE UP
BILIRUB UR-MCNC: NEGATIVE — SIGNIFICANT CHANGE UP
BUN SERPL-MCNC: 31 MG/DL — HIGH (ref 7–18)
CALCIUM SERPL-MCNC: 7.7 MG/DL — LOW (ref 8.4–10.5)
CHLORIDE SERPL-SCNC: 116 MMOL/L — HIGH (ref 96–108)
CO2 SERPL-SCNC: 28 MMOL/L — SIGNIFICANT CHANGE UP (ref 22–31)
COLOR SPEC: YELLOW — SIGNIFICANT CHANGE UP
CREAT SERPL-MCNC: 1.55 MG/DL — HIGH (ref 0.5–1.3)
DIFF PNL FLD: ABNORMAL
GLUCOSE BLDC GLUCOMTR-MCNC: 183 MG/DL — HIGH (ref 70–99)
GLUCOSE BLDC GLUCOMTR-MCNC: 190 MG/DL — HIGH (ref 70–99)
GLUCOSE BLDC GLUCOMTR-MCNC: 207 MG/DL — HIGH (ref 70–99)
GLUCOSE BLDC GLUCOMTR-MCNC: 211 MG/DL — HIGH (ref 70–99)
GLUCOSE BLDC GLUCOMTR-MCNC: 263 MG/DL — HIGH (ref 70–99)
GLUCOSE SERPL-MCNC: 248 MG/DL — HIGH (ref 70–99)
GLUCOSE UR QL: 100 MG/DL
HCT VFR BLD CALC: 30.2 % — LOW (ref 34.5–45)
HGB BLD-MCNC: 9.5 G/DL — LOW (ref 11.5–15.5)
KETONES UR-MCNC: NEGATIVE — SIGNIFICANT CHANGE UP
LEUKOCYTE ESTERASE UR-ACNC: ABNORMAL
MAGNESIUM SERPL-MCNC: 1.8 MG/DL — SIGNIFICANT CHANGE UP (ref 1.6–2.6)
MCHC RBC-ENTMCNC: 28.2 PG — SIGNIFICANT CHANGE UP (ref 27–34)
MCHC RBC-ENTMCNC: 31.4 GM/DL — LOW (ref 32–36)
MCV RBC AUTO: 89.6 FL — SIGNIFICANT CHANGE UP (ref 80–100)
NITRITE UR-MCNC: NEGATIVE — SIGNIFICANT CHANGE UP
PH UR: 6 — SIGNIFICANT CHANGE UP (ref 5–8)
PHOSPHATE SERPL-MCNC: 2 MG/DL — LOW (ref 2.5–4.5)
PLATELET # BLD AUTO: 300 K/UL — SIGNIFICANT CHANGE UP (ref 150–400)
POTASSIUM SERPL-MCNC: 3.7 MMOL/L — SIGNIFICANT CHANGE UP (ref 3.5–5.3)
POTASSIUM SERPL-SCNC: 3.7 MMOL/L — SIGNIFICANT CHANGE UP (ref 3.5–5.3)
PROT UR-MCNC: 30 MG/DL
RBC # BLD: 3.37 M/UL — LOW (ref 3.8–5.2)
RBC # FLD: 13.6 % — SIGNIFICANT CHANGE UP (ref 10.3–14.5)
SODIUM SERPL-SCNC: 150 MMOL/L — HIGH (ref 135–145)
SP GR SPEC: 1.01 — SIGNIFICANT CHANGE UP (ref 1.01–1.02)
UROBILINOGEN FLD QL: NEGATIVE — SIGNIFICANT CHANGE UP
WBC # BLD: 8 K/UL — SIGNIFICANT CHANGE UP (ref 3.8–10.5)
WBC # FLD AUTO: 8 K/UL — SIGNIFICANT CHANGE UP (ref 3.8–10.5)

## 2018-02-19 RX ORDER — SODIUM CHLORIDE 9 MG/ML
1000 INJECTION, SOLUTION INTRAVENOUS
Qty: 0 | Refills: 0 | Status: DISCONTINUED | OUTPATIENT
Start: 2018-02-19 | End: 2018-02-22

## 2018-02-19 RX ADMIN — Medication 10 MILLIGRAM(S): at 22:17

## 2018-02-19 RX ADMIN — CEFTRIAXONE 100 GRAM(S): 500 INJECTION, POWDER, FOR SOLUTION INTRAMUSCULAR; INTRAVENOUS at 17:10

## 2018-02-19 RX ADMIN — SODIUM CHLORIDE 4 MILLILITER(S): 9 INJECTION INTRAMUSCULAR; INTRAVENOUS; SUBCUTANEOUS at 09:37

## 2018-02-19 RX ADMIN — Medication 10 MILLIGRAM(S): at 05:55

## 2018-02-19 RX ADMIN — Medication 2: at 08:43

## 2018-02-19 RX ADMIN — Medication 1 PATCH: at 11:58

## 2018-02-19 RX ADMIN — Medication 25 MICROGRAM(S): at 05:55

## 2018-02-19 RX ADMIN — RALOXIFENE HYDROCHLORIDE 60 MILLIGRAM(S): 60 TABLET, COATED ORAL at 11:58

## 2018-02-19 RX ADMIN — PANTOPRAZOLE SODIUM 40 MILLIGRAM(S): 20 TABLET, DELAYED RELEASE ORAL at 17:06

## 2018-02-19 RX ADMIN — Medication 1: at 12:01

## 2018-02-19 RX ADMIN — Medication 10 MILLIGRAM(S): at 12:01

## 2018-02-19 RX ADMIN — Medication 3: at 17:08

## 2018-02-19 RX ADMIN — ATORVASTATIN CALCIUM 20 MILLIGRAM(S): 80 TABLET, FILM COATED ORAL at 22:15

## 2018-02-19 RX ADMIN — CARVEDILOL PHOSPHATE 3.12 MILLIGRAM(S): 80 CAPSULE, EXTENDED RELEASE ORAL at 17:08

## 2018-02-19 RX ADMIN — CITALOPRAM 5 MILLIGRAM(S): 10 TABLET, FILM COATED ORAL at 22:16

## 2018-02-19 RX ADMIN — DONEPEZIL HYDROCHLORIDE 10 MILLIGRAM(S): 10 TABLET, FILM COATED ORAL at 22:17

## 2018-02-19 RX ADMIN — Medication 1: at 22:12

## 2018-02-19 RX ADMIN — NYSTATIN CREAM 1 APPLICATION(S): 100000 CREAM TOPICAL at 11:58

## 2018-02-19 RX ADMIN — Medication 81 MILLIGRAM(S): at 11:58

## 2018-02-19 RX ADMIN — HEPARIN SODIUM 5000 UNIT(S): 5000 INJECTION INTRAVENOUS; SUBCUTANEOUS at 17:06

## 2018-02-19 RX ADMIN — LOSARTAN POTASSIUM 50 MILLIGRAM(S): 100 TABLET, FILM COATED ORAL at 05:55

## 2018-02-19 RX ADMIN — CARVEDILOL PHOSPHATE 3.12 MILLIGRAM(S): 80 CAPSULE, EXTENDED RELEASE ORAL at 05:55

## 2018-02-19 RX ADMIN — HEPARIN SODIUM 5000 UNIT(S): 5000 INJECTION INTRAVENOUS; SUBCUTANEOUS at 05:55

## 2018-02-19 RX ADMIN — SODIUM CHLORIDE 4 MILLILITER(S): 9 INJECTION INTRAMUSCULAR; INTRAVENOUS; SUBCUTANEOUS at 20:26

## 2018-02-19 NOTE — PROGRESS NOTE ADULT - ASSESSMENT
77 F from home, lives with friend PMH of MS, HTN, HLD , MS, CVA with left hemiparesis , bed bound at baseline was brought in by friend for respiratory distress. As per friend, she (herself) had a flu recently and she noticed pt doesn't look right on Friday night, called the PCP , recommended to gave cold medication. Pt did not c/o of any pain or SOB but noticed coughing. This morning she was found o be making gurgling noises and secretion/mucus coming out of her mouth. EMS arrived found her to be in respiratory distress with low sPO2 and accessory muscle use , endotracheal intubation was attempted twice with 40 mg of etomidate but failed , oral airway was inserted and ventilation via BMV were continued.    Patient was admitted to ICU intubated for hypoxic reps failure secondary to influenza and downgraded after Extubation  (2/15/18)

## 2018-02-19 NOTE — PROGRESS NOTE ADULT - ASSESSMENT
A 77 F from home, lives with friend PMH of MS, HTN, HLD , MS, CVA with left hemiparesis , was brought in to the ER on 2/12/18,by friend for respiratory distress.  EMS arrived found her to be in respiratory distress with low sPO2 and accessory muscle use , endotracheal intubation was attempted twice with 40 mg of etomidate but failed , oral airway was inserted and ventilation via BMV were continued. She found to have positive Influenza B and Pneumonia. She has completed the course of Influenza B and on Ceftriaxone for Pneumonia. On admission urine culture grew ESBL  E.coli in the setting of negative Urine analysis. Hence, Ceftriaxone change to Levaquin base don C & S, and The ID consult requested to assist with further antibiotic management.    # Influenza B - s/p tamiflu  # s/p Respiratory failure- s/p extubation  # Pneumonia- resolving  # ESBL  E.coli in Urine culture- Most likely a contaminant In the setting of Negative Urine analysis    Would recommend:  1. Reevaluate the need of mcfadden catheter  2. Continue Levaquin until 2/22/18 to complete the course of Pneumonia  3. Repeat Urine analysis  4. Discontinue Ceftriaxone   4. Aspiration precaution    d/w Patient and Nursing  staff    will follow the patient with you

## 2018-02-19 NOTE — PROGRESS NOTE ADULT - PROBLEM SELECTOR PLAN 5
PABLO hemodynamically mediated in the setting of infection/ hypotension. n  Renal function over all improving.

## 2018-02-19 NOTE — CONSULT NOTE ADULT - SUBJECTIVE AND OBJECTIVE BOX
[facility] NEPHROLOGY- CONSULTATION NOTE    Patient is a 78 yo F with MS, HTN, HLD , MS, CVA with left hemiparesis, bed bound at baseline admitted to MICU with acute respiratory failure s/p trach 2/2 Influenza B/ Acute bronchitis, PABLO, Elevated troponins 2/2 demand Ischemia and ESBL UTI. Renal consulted for elevated SCr.     Pt a/w SCr 2.77 which is improving s/p Tamiflu and currently on antibiotics. Renal function improved to SCr 1.55. Unknown baseline SCr. Pt denies any current SOB, chest pain or dysuria. s/p extubation 2/15.   Pt denies any previous h/o kidney disease.       PAST MEDICAL & SURGICAL HISTORY:  MS (multiple sclerosis)  CVA (cerebral vascular accident)  Diabetes mellitus  Hypertension  Hypothyroidism    Allergy Status Unknown    Home Medications Reviewed  Hospital Medications:   MEDICATIONS  (STANDING):  aspirin  chewable 81 milliGRAM(s) Oral daily  atorvastatin 20 milliGRAM(s) Oral at bedtime  baclofen 10 milliGRAM(s) Oral three times a day  carvedilol 3.125 milliGRAM(s) Oral every 12 hours  cefTRIAXone   IVPB 1 Gram(s) IV Intermittent every 24 hours  chlorhexidine 4% Liquid 1 Application(s) Topical daily  citalopram 5 milliGRAM(s) Oral at bedtime  cloNIDine Patch 0.1 mG/24Hr(s) 1 patch Topical <User Schedule>  dextrose 5%. 1000 milliLiter(s) (50 mL/Hr) IV Continuous <Continuous>  donepezil 10 milliGRAM(s) Oral at bedtime  heparin  Injectable 5000 Unit(s) SubCutaneous every 12 hours  insulin lispro (HumaLOG) corrective regimen sliding scale   SubCutaneous Before meals and at bedtime  levoFLOXacin IVPB      levoFLOXacin IVPB 250 milliGRAM(s) IV Intermittent every 24 hours  levothyroxine 25 MICROGram(s) Oral daily  losartan 50 milliGRAM(s) Oral daily  nystatin Powder 1 Application(s) Topical daily  pantoprazole  Injectable 40 milliGRAM(s) IV Push daily  raloxifene 60 milliGRAM(s) Oral daily  sodium chloride 3%  Inhalation 4 milliLiter(s) Inhalation every 6 hours        REVIEW OF SYSTEMS:  Gen: no changes in weight  HEENT: no rhinorrhea  Neck: no sore throat  Cards: no chest pain  Resp: no dyspnea  GI: no nausea or vomiting or diarrhea  : no dysuria or hematuria  Vascular: no LE edema  Derm: no rashes  Neuro: no numbness/tingling  All other review of systems is negative unless indicated above.    VITALS:  T(F): 99.3 (02-19-18 @ 07:51), Max: 99.3 (02-19-18 @ 07:51)  HR: 96 (02-19-18 @ 07:51)  BP: 159/85 (02-19-18 @ 07:51)  RR: 18 (02-19-18 @ 07:51)  SpO2: 93% (02-19-18 @ 07:51)  Wt(kg): --    02-18 @ 07:01  -  02-19 @ 07:00  --------------------------------------------------------  IN: 0 mL / OUT: 800 mL / NET: -800 mL    PHYSICAL EXAM:  Gen: NAD, calm  HEENT: MMM  Neck: no JVD  Cards: RRR, +S1/S2, no M/G/R  Resp: CTA B/L  GI: soft, NT/ND, NABS  : no CVA tenderness  Extremities: no LE edema B/L  Left hemiparesis    LABS:  02-19    150<H>  |  116<H>  |  31<H>  ----------------------------<  248<H>  3.7   |  28  |  1.55<H>    Ca    7.7<L>      19 Feb 2018 05:54  Phos  2.0     02-19  Mg     1.8     02-19      Creatinine Trend: 1.55 <--, 1.42 <--, 1.61 <--, 1.98 <--, 1.93 <--, 1.74 <--, 1.78 <--, 2.45 <--, 2.65 <--                        9.5    8.0   )-----------( 300      ( 19 Feb 2018 05:54 )             30.2     Urine Studies:      RADIOLOGY & ADDITIONAL STUDIES:

## 2018-02-19 NOTE — PROGRESS NOTE ADULT - PROBLEM SELECTOR PLAN 8
C/w home baclofen, Citalopram and donepezil   Pt needs MRI gadolinium scan when stable  Dr Yan consulted.

## 2018-02-19 NOTE — PROGRESS NOTE ADULT - SUBJECTIVE AND OBJECTIVE BOX
Patient seen and examined.   Time of visit:    MEDICATIONS  (STANDING):  aspirin  chewable 81 milliGRAM(s) Oral daily  atorvastatin 20 milliGRAM(s) Oral at bedtime  baclofen 10 milliGRAM(s) Oral three times a day  carvedilol 3.125 milliGRAM(s) Oral every 12 hours  cefTRIAXone   IVPB 1 Gram(s) IV Intermittent every 24 hours  chlorhexidine 4% Liquid 1 Application(s) Topical daily  citalopram 5 milliGRAM(s) Oral at bedtime  cloNIDine Patch 0.1 mG/24Hr(s) 1 patch Topical <User Schedule>  dextrose 5%. 1000 milliLiter(s) (50 mL/Hr) IV Continuous <Continuous>  donepezil 10 milliGRAM(s) Oral at bedtime  heparin  Injectable 5000 Unit(s) SubCutaneous every 12 hours  insulin lispro (HumaLOG) corrective regimen sliding scale   SubCutaneous Before meals and at bedtime  levoFLOXacin IVPB      levoFLOXacin IVPB 250 milliGRAM(s) IV Intermittent every 24 hours  levothyroxine 25 MICROGram(s) Oral daily  losartan 50 milliGRAM(s) Oral daily  nystatin Powder 1 Application(s) Topical daily  pantoprazole  Injectable 40 milliGRAM(s) IV Push daily  raloxifene 60 milliGRAM(s) Oral daily  sodium chloride 3%  Inhalation 4 milliLiter(s) Inhalation every 6 hours      MEDICATIONS  (PRN):  acetaminophen    Suspension 650 milliGRAM(s) Oral every 6 hours PRN For Temp greater than 38 C (100.4 F)  glycopyrrolate Injectable 0.2 milliGRAM(s) IV Push every 6 hours PRN Secretions  labetalol Injectable 10 milliGRAM(s) IV Push every 8 hours PRN Systolic/Diastolic >  morphine  - Injectable 0.5 milliGRAM(s) IV Push every 3 hours PRN Dyspnea   Medications up to date at time of exam.      PHYSICAL EXAMINATION:  Patient has no new complaints.  GENERAL: The patient is a well-developed, well-nourished, in no apparent distress.     Vital Signs Last 24 Hrs  T(C): 36.8 (19 Feb 2018 15:44), Max: 37.4 (19 Feb 2018 07:51)  T(F): 98.3 (19 Feb 2018 15:44), Max: 99.3 (19 Feb 2018 07:51)  HR: 108 (19 Feb 2018 15:44) (96 - 108)  BP: 154/86 (19 Feb 2018 15:44) (153/74 - 165/82)  BP(mean): --  RR: 18 (19 Feb 2018 15:44) (18 - 18)  SpO2: 91% (19 Feb 2018 15:44) (91% - 98%)   (if applicable)      HEENT: Head is normocephalic and atraumatic. Extraocular muscles are intact. Mucous membranes are moist.     NECK: Supple, no palpable adenopathy.    LUNGS: Clear to auscultation, no wheezing, rales, or rhonchi.    HEART: Regular rate and rhythm without murmur.    ABDOMEN: Soft, nontender, and nondistended.  No hepatosplenomegaly is noted.    EXTREMITIES: Without any cyanosis, clubbing, rash, lesions or edema.    NEUROLOGIC: Awake, alert, oriented.     SKIN: Warm, dry, good turgor.      LABS:                        9.5    8.0   )-----------( 300      ( 19 Feb 2018 05:54 )             30.2     02-19    150<H>  |  116<H>  |  31<H>  ----------------------------<  248<H>  3.7   |  28  |  1.55<H>    Ca    7.7<L>      19 Feb 2018 05:54  Phos  2.0     02-19  Mg     1.8     02-19        MICROBIOLOGY: (if applicable)    RADIOLOGY & ADDITIONAL STUDIES:  EKG:   CXR:  ECHO:    IMPRESSION: 77y Female PAST MEDICAL & SURGICAL HISTORY:  MS (multiple sclerosis)  CVA (cerebral vascular accident)  Diabetes mellitus  Hypertension  Hypothyroidism   p/w     RECOMMENDATIONS:    Patient presents with SOB, respiratory failure due to flu, increased troponins due to demand ischemia.   Patient with intermittent tachycardia, in setting of infection, improving  2D echo shows grossly normal EF.  Patient clinically improved  BP stable, con't w/ supportive care.

## 2018-02-19 NOTE — PROGRESS NOTE ADULT - SUBJECTIVE AND OBJECTIVE BOX
Patient is seen and examined at the bed side, is afebrile. She has no complaints, mcfadden catheter in placed.        REVIEW OF SYSTEMS: All other review systems are negative        Vital Signs Last 24 Hrs  T(C): 36.8 (19 Feb 2018 15:44), Max: 37.4 (19 Feb 2018 07:51)  T(F): 98.3 (19 Feb 2018 15:44), Max: 99.3 (19 Feb 2018 07:51)  HR: 108 (19 Feb 2018 15:44) (96 - 108)  BP: 154/86 (19 Feb 2018 15:44) (153/74 - 165/82)  BP(mean): --  RR: 18 (19 Feb 2018 15:44) (18 - 18)  SpO2: 91% (19 Feb 2018 15:44) (91% - 98%)          PHYSICAL EXAM:  GENERAL: Not in distress  CVS: s1 and s2 present   RESP: Air entry B/L  GI: Abdomen soft and nontender  : Mcfadden catheter in palced  EXT: No pedal edema  CNS:  Awake and alert        ALLERGIES: NKDA          LABS:                        9.5    8.0   )-----------( 300      ( 19 Feb 2018 05:54 )             30.2                           10.1   8.3   )-----------( 281      ( 18 Feb 2018 05:59 )             32.0         02-19    150<H>  |  116<H>  |  31<H>  ----------------------------<  248<H>  3.7   |  28  |  1.55<H>    Ca    7.7<L>      19 Feb 2018 05:54  Phos  2.0     02-19  Mg     1.8     02-19      02-18    146<H>  |  112<H>  |  30<H>  ----------------------------<  106<H>  3.2<L>   |  25  |  1.42<H>    Ca    8.1<L>      18 Feb 2018 05:59  Phos  3.0     02-17  Mg     2.1     02-17          CAPILLARY BLOOD GLUCOSE      POCT Blood Glucose.: 158 mg/dL (18 Feb 2018 17:17)  POCT Blood Glucose.: 141 mg/dL (18 Feb 2018 11:46)  POCT Blood Glucose.: 114 mg/dL (18 Feb 2018 08:34)  POCT Blood Glucose.: 119 mg/dL (17 Feb 2018 22:09)          MEDICATIONS  (STANDING):  aspirin  chewable 81 milliGRAM(s) Oral daily  atorvastatin 20 milliGRAM(s) Oral at bedtime  baclofen 10 milliGRAM(s) Oral three times a day  carvedilol 3.125 milliGRAM(s) Oral every 12 hours  cefTRIAXone   IVPB 1 Gram(s) IV Intermittent every 24 hours  chlorhexidine 4% Liquid 1 Application(s) Topical daily  citalopram 5 milliGRAM(s) Oral at bedtime  cloNIDine Patch 0.1 mG/24Hr(s) 1 patch Topical <User Schedule>  dextrose 5%. 1000 milliLiter(s) (50 mL/Hr) IV Continuous <Continuous>  donepezil 10 milliGRAM(s) Oral at bedtime  heparin  Injectable 5000 Unit(s) SubCutaneous every 12 hours  insulin lispro (HumaLOG) corrective regimen sliding scale   SubCutaneous Before meals and at bedtime  levoFLOXacin IVPB      levoFLOXacin IVPB 250 milliGRAM(s) IV Intermittent every 24 hours  levothyroxine 25 MICROGram(s) Oral daily  losartan 50 milliGRAM(s) Oral daily  nystatin Powder 1 Application(s) Topical daily  pantoprazole  Injectable 40 milliGRAM(s) IV Push daily  raloxifene 60 milliGRAM(s) Oral daily  sodium chloride 3%  Inhalation 4 milliLiter(s) Inhalation every 6 hours    MEDICATIONS  (PRN):  acetaminophen    Suspension 650 milliGRAM(s) Oral every 6 hours PRN For Temp greater than 38 C (100.4 F)  glycopyrrolate Injectable 0.2 milliGRAM(s) IV Push every 6 hours PRN Secretions  labetalol Injectable 10 milliGRAM(s) IV Push every 8 hours PRN Systolic/Diastolic >  morphine  - Injectable 0.5 milliGRAM(s) IV Push every 3 hours PRN Dyspnea          RADIOLOGY & ADDITIONAL TESTS:    2/16/18: Xray Chest 1 View- PORTABLE-Routine (02.16.18 @ 07:10) : Poor inspiration crowds the chest. The heart is magnified by technique. Endotracheal tube and nasogastric tube seen on February 15 have been removed. There are mild basilar effusions possibly with some adjacent atelectasis but this has shown significant improvement particularly on the left compared to February 15.        2/15/18: Xray Chest 1 View- PORTABLE-Routine (02.15.18 @ 11:04) : ET and enteric tubes remain in place. No pneumothorax. Bibasilar airspace opacities, left worst than right, similar to the prior  study. Possible trace left pleural effusion. The cardiac silhouette is within normal limits in size.          MICROBIOLOGY DATA:      Culture - Urine (02.15.18 @ 13:37)    -  Amikacin: S <=8    -  Ampicillin: R >16    -  Ampicillin/Sulbactam: R >16/8    -  Aztreonam: R >16    -  Cefazolin: R >16    -  Cefepime: R >16    -  Cefoxitin: R >16    -  Ceftazidime: R >16    -  Ceftriaxone: R >32    -  Ciprofloxacin: S <=0.5    -  Ertapenem: S <=0.5    -  Gentamicin: S 2    -  Imipenem: S <=1    -  Levofloxacin: S <=1    -  Meropenem: S <=1    -  Nitrofurantoin: S <=32    -  Piperacillin/Tazobactam: R <=8    -  Tobramycin: S <=2    -  Trimethoprim/Sulfamethoxazole: R >2/38    Specimen Source: .Urine Clean Catch (Midstream)    Culture Results:   >100,000 CFU/ml Escherichia coli ESBL    Organism Identification: Escherichia coli ESBL    Organism: Escherichia coli ESBL    Method Type: AJ

## 2018-02-19 NOTE — PROGRESS NOTE ADULT - SUBJECTIVE AND OBJECTIVE BOX
==================PGY 1 Note===================   Discussed with supervising resident and primary attending    ================CHIEF COMPLAINT===============  Patient is a 77y old  Female who presents with a chief complaint of Respiratory failure (12 Feb 2018 10:41)        =========INTERVAL HPI/OVERNIGHT EVENTS=========  Offers no new complaints; current symptoms resolving      ============CURRENT MEDICATIONS===============    MEDICATIONS  (STANDING):  aspirin  chewable 81 milliGRAM(s) Oral daily  atorvastatin 20 milliGRAM(s) Oral at bedtime  baclofen 10 milliGRAM(s) Oral three times a day  carvedilol 3.125 milliGRAM(s) Oral every 12 hours  cefTRIAXone   IVPB 1 Gram(s) IV Intermittent every 24 hours  chlorhexidine 4% Liquid 1 Application(s) Topical daily  citalopram 5 milliGRAM(s) Oral at bedtime  cloNIDine Patch 0.1 mG/24Hr(s) 1 patch Topical <User Schedule>  dextrose 5%. 1000 milliLiter(s) (50 mL/Hr) IV Continuous <Continuous>  donepezil 10 milliGRAM(s) Oral at bedtime  heparin  Injectable 5000 Unit(s) SubCutaneous every 12 hours  insulin lispro (HumaLOG) corrective regimen sliding scale   SubCutaneous Before meals and at bedtime  levoFLOXacin IVPB      levoFLOXacin IVPB 250 milliGRAM(s) IV Intermittent every 24 hours  levothyroxine 25 MICROGram(s) Oral daily  losartan 50 milliGRAM(s) Oral daily  nystatin Powder 1 Application(s) Topical daily  pantoprazole  Injectable 40 milliGRAM(s) IV Push daily  raloxifene 60 milliGRAM(s) Oral daily  sodium chloride 3%  Inhalation 4 milliLiter(s) Inhalation every 6 hours    MEDICATIONS  (PRN):  acetaminophen    Suspension 650 milliGRAM(s) Oral every 6 hours PRN For Temp greater than 38 C (100.4 F)  glycopyrrolate Injectable 0.2 milliGRAM(s) IV Push every 6 hours PRN Secretions  labetalol Injectable 10 milliGRAM(s) IV Push every 8 hours PRN Systolic/Diastolic >  morphine  - Injectable 0.5 milliGRAM(s) IV Push every 3 hours PRN Dyspnea        ============REVIEW OF SYSTEMS==================    CONSTITUTIONAL: No fever  EYES: no acute visual disturbances  NECK: No pain or stiffness  RESPIRATORY: No cough; No shortness of breath  CARDIOVASCULAR: No chest pain, no palpitations  GASTROINTESTINAL: No pain. No nausea or vomiting; No diarrhea   NEUROLOGICAL: No headache or numbness, no tremors  MUSCULOSKELETAL: No joint pain, no muscle pain  GENITOURINARY: no dysuria, no frequency, no hesitancy  PSYCHIATRY: no depression , no anxiety  ALL OTHER  ROS negative      ================VITALS SIGNS=====================  Vital Signs Last 24 Hrs  T(C): 37.4 (19 Feb 2018 07:51), Max: 37.4 (19 Feb 2018 07:51)  T(F): 99.3 (19 Feb 2018 07:51), Max: 99.3 (19 Feb 2018 07:51)  HR: 96 (19 Feb 2018 07:51) (96 - 105)  BP: 159/85 (19 Feb 2018 07:51) (148/73 - 165/82)  BP(mean): --  RR: 18 (19 Feb 2018 07:51) (18 - 18)  SpO2: 93% (19 Feb 2018 07:51) (93% - 100%)    ===============PHYSICAL EXAM====================    GENERAL: NAD  HEENT: Normocephalic;  conjunctivae and sclerae clear; moist mucous membranes;   NECK : supple  CHEST/LUNG: Clear to auscultation bilaterally with good air entry   HEART: S1 S2  regular; no murmurs, gallops or rubs  ABDOMEN: Soft, Nontender, Nondistended; Bowel sounds present  EXTREMITIES: no cyanosis; no edema; no calf tenderness  SKIN: warm and dry; no rash  NERVOUS SYSTEM:  Awake and alert; Oriented  to place, person and time ; no new deficits    ==============LABORATORIES======================  LABS:                        9.5    8.0   )-----------( 300      ( 19 Feb 2018 05:54 )             30.2     02-19    150<H>  |  116<H>  |  31<H>  ----------------------------<  248<H>  3.7   |  28  |  1.55<H>    Ca    7.7<L>      19 Feb 2018 05:54  Phos  2.0     02-19  Mg     1.8     02-19          CAPILLARY BLOOD GLUCOSE      POCT Blood Glucose.: 183 mg/dL (19 Feb 2018 11:55)  POCT Blood Glucose.: 211 mg/dL (19 Feb 2018 08:30)  POCT Blood Glucose.: 237 mg/dL (18 Feb 2018 21:31)  POCT Blood Glucose.: 158 mg/dL (18 Feb 2018 17:17)      =============INPUTS/OUPUTS=====================    02-18-18 @ 07:01  -  02-19-18 @ 07:00  --------------------------------------------------------  IN: 0 mL / OUT: 800 mL / NET: -800 mL          RADIOLOGY & ADDITIONAL TESTS:    Imaging Personally Reviewed:  YES    Consultant(s) Notes Reviewed:   YES    Care Discussed with Consultants : YES    Plan of care was discussed with patient  and /or primary care giver; all questions and concerns were addressed and care was aligned with patient's wishes. Time was allowed for questions that were answered to the best of my abilities

## 2018-02-19 NOTE — CONSULT NOTE ADULT - ATTENDING COMMENTS
[facility] NEPHROLOGY  Rogelio Freire M.D.  Villa Blakely D.O.  Erika Fuentes M.D.  Nadine Diaz, MSN, ANP-C  [phone]    [address] [place] [address]
IM Attending Covering Medicine For    **  will resume care tomorrow 2/19/18 as IM Attending

## 2018-02-19 NOTE — CONSULT NOTE ADULT - ASSESSMENT
Patient is a 78 yo F with MS, HTN, HLD , MS, CVA with left hemiparesis, bed bound at baseline admitted to MICU with acute respiratory failure s/p trach 2/2 Influenza B/ Acute bronchitis, PABLO, Elevated troponins 2/2 demand Ischemia and ESBL UTI. Renal consulted for elevated SCr.

## 2018-02-19 NOTE — CONSULT NOTE ADULT - SUBJECTIVE AND OBJECTIVE BOX
Time of visit:    CHIEF COMPLAINT: Patient is a 77y old  Female who presents with a chief complaint of Respiratory failure (12 Feb 2018 10:41)      HPI:  77 F from home, lives with friend PMH of MS, HTN, HLD , MS, CVA with left hemiparesis , bed bound at baseline was brought in by friend for respiratory distress. As per friend, she (herself) had a flu recently and she noticed pt doesn't look right on friday night , called the PCP , recommended to gave cold medication. Pt did not c/o of any pain or SOB but noticed coughing. This morning she was found o be making gurgling noises and secretion/mucus coming out of her mouth. EMS arrived found her to be in respiratory distress with low sPO2 and accessory muscle use , endotracheal intubation was attempted twice with 40 mg of etomidate but failed , oral airway was inserted and ventilation via BMV were continued.   As per friend she has no other family member and she takes care of her , reported having HCP at home and does not want her to be resuscitated if her heart stops; stated "if she goes let her go". (12 Feb 2018 10:41)   Patient seen and examined.     PAST MEDICAL & SURGICAL HISTORY:  MS (multiple sclerosis)  CVA (cerebral vascular accident)  Diabetes mellitus  Hypertension  Hypothyroidism      Allergies    Allergy Status Unknown    Intolerances        MEDICATIONS  (STANDING):  aspirin  chewable 81 milliGRAM(s) Oral daily  atorvastatin 20 milliGRAM(s) Oral at bedtime  baclofen 10 milliGRAM(s) Oral three times a day  carvedilol 3.125 milliGRAM(s) Oral every 12 hours  cefTRIAXone   IVPB 1 Gram(s) IV Intermittent every 24 hours  chlorhexidine 4% Liquid 1 Application(s) Topical daily  citalopram 5 milliGRAM(s) Oral at bedtime  cloNIDine Patch 0.1 mG/24Hr(s) 1 patch Topical <User Schedule>  dextrose 5%. 1000 milliLiter(s) (50 mL/Hr) IV Continuous <Continuous>  donepezil 10 milliGRAM(s) Oral at bedtime  heparin  Injectable 5000 Unit(s) SubCutaneous every 12 hours  insulin lispro (HumaLOG) corrective regimen sliding scale   SubCutaneous Before meals and at bedtime  levoFLOXacin IVPB      levoFLOXacin IVPB 250 milliGRAM(s) IV Intermittent every 24 hours  levothyroxine 25 MICROGram(s) Oral daily  losartan 50 milliGRAM(s) Oral daily  nystatin Powder 1 Application(s) Topical daily  pantoprazole  Injectable 40 milliGRAM(s) IV Push daily  raloxifene 60 milliGRAM(s) Oral daily  sodium chloride 3%  Inhalation 4 milliLiter(s) Inhalation every 6 hours      MEDICATIONS  (PRN):  acetaminophen    Suspension 650 milliGRAM(s) Oral every 6 hours PRN For Temp greater than 38 C (100.4 F)  glycopyrrolate Injectable 0.2 milliGRAM(s) IV Push every 6 hours PRN Secretions  labetalol Injectable 10 milliGRAM(s) IV Push every 8 hours PRN Systolic/Diastolic >  morphine  - Injectable 0.5 milliGRAM(s) IV Push every 3 hours PRN Dyspnea   Medications up to date at time of exam.    Medications up to date at time of exam.    FAMILY HISTORY:      SOCIAL HISTORY  Smoking History: [   ] smoking/smoke exposure, [   ] former smoker  Living Condition: [   ] apartment, [   ] private house  Work History:   Travel History: denies recent travel  Illicit Substance Use: denies  Alcohol Use: denies    REVIEW OF SYSTEMS: as per EMR    CONSTITUTIONAL:  denies fevers, chills, sweats, weight loss    HEENT:  denies diplopia or blurred vision, sore throat or runny nose.    CARDIOVASCULAR:  denies pressure, squeezing, tightness, or heaviness about the chest; no palpitations.    RESPIRATORY:  denies SOB, cough, DURÁN, wheezing.    GASTROINTESTINAL:  denies abdominal pain, nausea, vomiting or diarrhea.    GENITOURINARY: denies dysuria, frequency or urgency.    NEUROLOGIC:  awake and alert. muscle weakness    PSYCHIATRIC:  denies disorder of thought or mood.    MSK: denies swelling, redness      PHYSICAL EXAMINATION:    GENERAL: The patient is a well-developed, well-nourished, in no apparent distress.     Vital Signs Last 24 Hrs  T(C): 36.8 (19 Feb 2018 15:44), Max: 37.4 (19 Feb 2018 07:51)  T(F): 98.3 (19 Feb 2018 15:44), Max: 99.3 (19 Feb 2018 07:51)  HR: 108 (19 Feb 2018 15:44) (96 - 108)  BP: 154/86 (19 Feb 2018 15:44) (153/74 - 165/82)  BP(mean): --  RR: 18 (19 Feb 2018 15:44) (18 - 18)  SpO2: 91% (19 Feb 2018 15:44) (91% - 98%)   (if applicable)    Chest Tube (if applicable)    HEENT: Head is normocephalic and atraumatic. Extraocular muscles are intact. Mucous membranes are moist.     NECK: Supple, no palpable adenopathy.    LUNGS: Clear to auscultation, no wheezing, rales, or rhonchi.    HEART: Regular rate and rhythm without murmur.    ABDOMEN: Soft, nontender, and nondistended.  No hepatosplenomegaly is noted.    EXTREMITIES: Without any cyanosis, clubbing, rash, lesions or edema.    NEUROLOGIC: Awake, alert,  weakness 2/5 all ext    SKIN: Warm, dry, good turgor.      LABS:                        9.5    8.0   )-----------( 300      ( 19 Feb 2018 05:54 )             30.2     02-19    150<H>  |  116<H>  |  31<H>  ----------------------------<  248<H>  3.7   |  28  |  1.55<H>    Ca    7.7<L>      19 Feb 2018 05:54  Phos  2.0     02-19  Mg     1.8     02-19                          MICROBIOLOGY: (if applicable)    RADIOLOGY & ADDITIONAL STUDIES:  EKG:   CXR:< from: Xray Chest 1 View- PORTABLE-Routine (02.16.18 @ 07:10) >  INTERPRETATION:  AP chest on February 16, 2018 at 6:29 AM. Patient is   extubated after respiratory failure and hypoxia.    Poor inspiration crowds the chest. The heart is magnified by technique.    Endotracheal tube and nasogastric tube seen on February 15 have been   removed.    There are mild basilar effusions possibly with some adjacent atelectasis   but this has shown significant improvementparticularly on the left   compared to February 15.    IMPRESSION: Improved basilar findings. Tubes removed.      < end of copied text >    ECHO:    IMPRESSION: 77y Female PAST MEDICAL & SURGICAL HISTORY:  MS (multiple sclerosis)  CVA (cerebral vascular accident)  Diabetes mellitus  Hypertension  Hypothyroidism   p/w     with acute resp failure due to influenza requiring oral intubation. She was extubated and completed course of tamiflu.    plan  -continue antibx  -asp precaution  -continue meds

## 2018-02-19 NOTE — PROGRESS NOTE ADULT - PROBLEM SELECTOR PLAN 7
Patient has hx of CVA with left hemiparesis but only takes Lipitor 20 mg (not on aspirin)   C/w ASA, Statin.

## 2018-02-19 NOTE — PROGRESS NOTE ADULT - PROBLEM SELECTOR PLAN 1
S/P Acute respiratory failure  Secondary to Influenza and Pneumonia  C/W Antiobitics  Completed Tamiflu

## 2018-02-19 NOTE — DIETITIAN INITIAL EVALUATION ADULT. - PROBLEM SELECTOR PLAN 7
"              After Visit Summary   2/19/2018    Theo Veloz    MRN: [id number]           Patient Information     Date Of Birth          2018        Visit Information        Provider Department      2/19/2018 5:50 PM Efra Royal MD [facility]        Today's Diagnoses     Encounter for routine child health examination w/o abnormal findings    -  1      Care Instructions        Preventive Care at the 18 Month Visit  Growth Measurements & Percentiles  Head Circumference: 19\" (48.3 cm) (60 %, Source: WHO (Boys, 0-2 years)) 60 %ile based on WHO (Boys, 0-2 years) head circumference-for-age data using vitals from 2/19/2018.   Weight: 21 lbs 8 oz / 9.75 kg (actual weight) / 6 %ile based on WHO (Boys, 0-2 years) weight-for-age data using vitals from 2/19/2018.   Length: 2' 7.25\" / 79.4 cm 1 %ile based on WHO (Boys, 0-2 years) length-for-age data using vitals from 2/19/2018.   Weight for length: 24 %ile based on WHO (Boys, 0-2 years) weight-for-recumbent length data using vitals from 2/19/2018.    Your toddler s next Preventive Check-up will be at 1 years of age    Development  At this age, most children will:    Walk fast, run stiffly, walk backwards and walk up stairs with one hand held.    Sit in a small chair and climb into an adult chair.    Kick and throw a ball.    Stack three or four blocks and put rings on a cone.    Turn single pages in a book or magazine, look at pictures and name some objects    Speak four to 10 words, combine two-word phrases, understand and follow simple directions, and point to a body part when asked.    Imitate a crayon stroke on paper.    Feed himself, use a spoon and hold and drink from a sippy cup fairly well.    Use a household toy (like a toy telephone) well.    Feeding Tips    Your toddler's food likes and dislikes may change.  Do not make mealtimes a saleh.  Your toddler may be stubborn, but he often copies your eating habits.  This is not done on purpose.  " Give your toddler a good example and eat healthy every day.    Offer your toddler a variety of foods.    The amount of food your toddler should eat should average one  good  meal each day.    To see if your toddler has a healthy diet, look at a four or five day span to see if he is eating a good balance of foods from the food groups.    Your toddler may have an interest in sweets.  Try to offer nutritional, naturally sweet foods such as fruit or dried fruits.  Offer sweets no more than once each day.  Avoid offering sweets as a reward for completing a meal.    Teach your toddler to wash his or her hands and face often.  This is important before eating and drinking.    Toilet Training    Your toddler may show interest in potty training.  Signs he may be ready include dry naps, use of words like  pee pee,   wee wee  or  poo,  grunting and straining after meals, wanting to be changed when they are dirty, realizing the need to go, going to the potty alone and undressing.  For most children, this interest in toilet training happens between the ages of 2 and 3.    Sleep    Most children this age take one nap a day.  If your toddler does not nap, you may want to start a  quiet time.     Your toddler may have night fears.  Using a night light or opening the bedroom door may help calm fears.    Choose calm activities before bedtime.    Continue your regular nighttime routine: bath, brushing teeth and reading.    Safety    Use an approved toddler car seat every time your child rides in the car.  Make sure to install it in the back seat.  Your toddler should remain rear-facing until 2 years of age.    Protect your toddler from falls, burns, drowning, choking and other accidents.    Keep all medicines, cleaning supplies and poisons out of your toddler s reach. Call the poison control center or your health care provider for directions in case your toddler swallows poison.    Put the poison control number on all phones:   1-174.383.5714.    Use sunscreen with a SPF of more than 15 when your toddler is outside.    Never leave your child alone in the bathtub or near water.    Do not leave your child alone in the car, even if he or she is asleep.    What Your Toddler Needs    Your toddler may become stubborn and possessive.  Do not expect him or her to share toys with other children.  Give your toddler strong toys that can pull apart, be put together or be used to build.  Stay away from toys with small or sharp parts.    Your toddler may become interested in what s in drawers, cabinets and wastebaskets.  If possible, let him look through (unload and re-load) some drawers or cupboards.    Make sure your toddler is getting consistent discipline at home and at day care. Talk with your  provider if this isn t the case.    Praise your toddler for positive, appropriate behavior.  Your toddler does not understand danger or remember the word  no.     Read to your toddler often.    Dental Care    Brush your toddler s teeth one to two times each day with a soft-bristled toothbrush.    Use a small amount (smaller than pea size) of fluoridated toothpaste once daily.    Let your toddler play with the toothbrush after brushing    Your pediatric provider will speak with you regarding the need for regular dental appointments for cleanings and check-ups starting when your child s first tooth appears. (Your child may need fluoride supplements if you have well water.)                  Follow-ups after your visit        Who to contact     If you have questions or need follow up information about today's clinic visit or your schedule please contact [facility] directly at [phone].  Normal or non-critical lab and imaging results will be communicated to you by MyChart, letter or phone within 4 business days after the clinic has received the results. If you do not hear from us within 7 days, please contact the clinic through [employer]  "or phone. If you have a critical or abnormal lab result, we will notify you by phone as soon as possible.  Submit refill requests through [employer] or call your pharmacy and they will forward the refill request to us. Please allow 3 business days for your refill to be completed.          Additional Information About Your Visit        Access Information Managementhart Information     [employer] lets you send messages to your doctor, view your test results, renew your prescriptions, schedule appointments and more. To sign up, go to [email or web address], contact your [facility] clinic or call [phone] during business hours.            Care EveryWhere ID     This is your Care EveryWhere ID. This could be used by other organizations to access your [facility] medical records  [id number]        Your Vitals Were     Pulse Temperature Respirations Height Head Circumference Pulse Oximetry    121 97.6  F (36.4  C) (Tympanic) 22 2' 7.25\" (0.794 m) 19\" (48.3 cm) 99%    BMI (Body Mass Index)                   15.48 kg/m2            Blood Pressure from Last 3 Encounters:   No data found for BP    Weight from Last 3 Encounters:   02/19/18 21 lb 8 oz (9.752 kg) (6 %)*   02/13/18 22 lb (9.979 kg) (9 %)*   08/01/17 20 lb 3 oz (9.157 kg) (14 %)*     * Growth percentiles are based on WHO (Boys, 0-2 years) data.              Today, you had the following     No orders found for display       Primary Care Provider Office Phone # Fax #    Efra Royal [phone] [address] [place] [address]        Equal Access to Services     : Hadii maria del carmen to [place], waaxda luqadaha, qaybta kaalvicky kimball. So [facility] [phone].    ATENCIÓN: Si habla español, tiene a silver disposición servicios gratuitos de asistencia lingüística. Llame al [phone].    We comply with applicable federal civil rights laws and [place] laws. We do not discriminate on the basis of race, color, " national origin, age, disability, sex, sexual orientation, or gender identity.            Thank you!     Thank you for choosing [facility]  for your care. Our goal is always to provide you with excellent care. Hearing back from our patients is one way we can continue to improve our services. Please take a few minutes to complete the written survey that you may receive in the mail after your visit with us. Thank you!             Your Updated Medication List - Protect others around you: Learn how to safely use, store and throw away your medicines at www.disposemymeds.org.          This list is accurate as of 2/19/18  5:54 PM.  Always use your most recent med list.                   Brand Name Dispense Instructions for use Diagnosis    amoxicillin 400 MG/5ML suspension    AMOXIL    100 mL    Take 5 mLs (400 mg) by mouth 2 times daily for 10 days    Bilateral acute serous otitis media, recurrence not specified          Will start DVT ppx if CT head negative   C/w protonix for GI ppx

## 2018-02-20 DIAGNOSIS — R53.2 FUNCTIONAL QUADRIPLEGIA: ICD-10-CM

## 2018-02-20 DIAGNOSIS — J96.90 RESPIRATORY FAILURE, UNSPECIFIED, UNSPECIFIED WHETHER WITH HYPOXIA OR HYPERCAPNIA: ICD-10-CM

## 2018-02-20 LAB
ANION GAP SERPL CALC-SCNC: 7 MMOL/L — SIGNIFICANT CHANGE UP (ref 5–17)
APPEARANCE UR: CLEAR — SIGNIFICANT CHANGE UP
BASOPHILS # BLD AUTO: 0 K/UL — SIGNIFICANT CHANGE UP (ref 0–0.2)
BASOPHILS NFR BLD AUTO: 0.2 % — SIGNIFICANT CHANGE UP (ref 0–2)
BILIRUB UR-MCNC: NEGATIVE — SIGNIFICANT CHANGE UP
BUN SERPL-MCNC: 23 MG/DL — HIGH (ref 7–18)
CALCIUM SERPL-MCNC: 7.9 MG/DL — LOW (ref 8.4–10.5)
CHLORIDE SERPL-SCNC: 113 MMOL/L — HIGH (ref 96–108)
CO2 SERPL-SCNC: 30 MMOL/L — SIGNIFICANT CHANGE UP (ref 22–31)
COLOR SPEC: YELLOW — SIGNIFICANT CHANGE UP
CREAT SERPL-MCNC: 1.23 MG/DL — SIGNIFICANT CHANGE UP (ref 0.5–1.3)
CULTURE RESULTS: SIGNIFICANT CHANGE UP
DIFF PNL FLD: ABNORMAL
EOSINOPHIL # BLD AUTO: 0.1 K/UL — SIGNIFICANT CHANGE UP (ref 0–0.5)
EOSINOPHIL NFR BLD AUTO: 1.5 % — SIGNIFICANT CHANGE UP (ref 0–6)
GLUCOSE BLDC GLUCOMTR-MCNC: 175 MG/DL — HIGH (ref 70–99)
GLUCOSE BLDC GLUCOMTR-MCNC: 190 MG/DL — HIGH (ref 70–99)
GLUCOSE BLDC GLUCOMTR-MCNC: 191 MG/DL — HIGH (ref 70–99)
GLUCOSE BLDC GLUCOMTR-MCNC: 210 MG/DL — HIGH (ref 70–99)
GLUCOSE SERPL-MCNC: 193 MG/DL — HIGH (ref 70–99)
GLUCOSE UR QL: NEGATIVE — SIGNIFICANT CHANGE UP
HCT VFR BLD CALC: 29.6 % — LOW (ref 34.5–45)
HGB BLD-MCNC: 9.2 G/DL — LOW (ref 11.5–15.5)
KETONES UR-MCNC: NEGATIVE — SIGNIFICANT CHANGE UP
LEUKOCYTE ESTERASE UR-ACNC: ABNORMAL
LYMPHOCYTES # BLD AUTO: 1.4 K/UL — SIGNIFICANT CHANGE UP (ref 1–3.3)
LYMPHOCYTES # BLD AUTO: 16.3 % — SIGNIFICANT CHANGE UP (ref 13–44)
MCHC RBC-ENTMCNC: 27.7 PG — SIGNIFICANT CHANGE UP (ref 27–34)
MCHC RBC-ENTMCNC: 31.1 GM/DL — LOW (ref 32–36)
MCV RBC AUTO: 89.3 FL — SIGNIFICANT CHANGE UP (ref 80–100)
MONOCYTES # BLD AUTO: 0.5 K/UL — SIGNIFICANT CHANGE UP (ref 0–0.9)
MONOCYTES NFR BLD AUTO: 5.9 % — SIGNIFICANT CHANGE UP (ref 2–14)
NEUTROPHILS # BLD AUTO: 6.6 K/UL — SIGNIFICANT CHANGE UP (ref 1.8–7.4)
NEUTROPHILS NFR BLD AUTO: 76 % — SIGNIFICANT CHANGE UP (ref 43–77)
NITRITE UR-MCNC: NEGATIVE — SIGNIFICANT CHANGE UP
PH UR: 6.5 — SIGNIFICANT CHANGE UP (ref 5–8)
PLATELET # BLD AUTO: 341 K/UL — SIGNIFICANT CHANGE UP (ref 150–400)
POTASSIUM SERPL-MCNC: 3.3 MMOL/L — LOW (ref 3.5–5.3)
POTASSIUM SERPL-SCNC: 3.3 MMOL/L — LOW (ref 3.5–5.3)
PROT UR-MCNC: 30 MG/DL
RBC # BLD: 3.32 M/UL — LOW (ref 3.8–5.2)
RBC # FLD: 13.5 % — SIGNIFICANT CHANGE UP (ref 10.3–14.5)
SODIUM SERPL-SCNC: 150 MMOL/L — HIGH (ref 135–145)
SP GR SPEC: 1.01 — SIGNIFICANT CHANGE UP (ref 1.01–1.02)
SPECIMEN SOURCE: SIGNIFICANT CHANGE UP
UROBILINOGEN FLD QL: NEGATIVE — SIGNIFICANT CHANGE UP
WBC # BLD: 8.7 K/UL — SIGNIFICANT CHANGE UP (ref 3.8–10.5)
WBC # FLD AUTO: 8.7 K/UL — SIGNIFICANT CHANGE UP (ref 3.8–10.5)

## 2018-02-20 RX ORDER — POTASSIUM CHLORIDE 20 MEQ
40 PACKET (EA) ORAL EVERY 4 HOURS
Qty: 0 | Refills: 0 | Status: COMPLETED | OUTPATIENT
Start: 2018-02-20 | End: 2018-02-20

## 2018-02-20 RX ADMIN — Medication 10 MILLIGRAM(S): at 07:53

## 2018-02-20 RX ADMIN — SODIUM CHLORIDE 4 MILLILITER(S): 9 INJECTION INTRAMUSCULAR; INTRAVENOUS; SUBCUTANEOUS at 14:31

## 2018-02-20 RX ADMIN — NYSTATIN CREAM 1 APPLICATION(S): 100000 CREAM TOPICAL at 11:54

## 2018-02-20 RX ADMIN — Medication 1: at 22:48

## 2018-02-20 RX ADMIN — Medication 10 MILLIGRAM(S): at 22:48

## 2018-02-20 RX ADMIN — DONEPEZIL HYDROCHLORIDE 10 MILLIGRAM(S): 10 TABLET, FILM COATED ORAL at 22:48

## 2018-02-20 RX ADMIN — Medication 40 MILLIEQUIVALENT(S): at 17:47

## 2018-02-20 RX ADMIN — HEPARIN SODIUM 5000 UNIT(S): 5000 INJECTION INTRAVENOUS; SUBCUTANEOUS at 17:48

## 2018-02-20 RX ADMIN — RALOXIFENE HYDROCHLORIDE 60 MILLIGRAM(S): 60 TABLET, COATED ORAL at 13:03

## 2018-02-20 RX ADMIN — HEPARIN SODIUM 5000 UNIT(S): 5000 INJECTION INTRAVENOUS; SUBCUTANEOUS at 05:39

## 2018-02-20 RX ADMIN — Medication 2: at 17:47

## 2018-02-20 RX ADMIN — SODIUM CHLORIDE 4 MILLILITER(S): 9 INJECTION INTRAMUSCULAR; INTRAVENOUS; SUBCUTANEOUS at 20:18

## 2018-02-20 RX ADMIN — ROBINUL 0.2 MILLIGRAM(S): 0.2 INJECTION INTRAMUSCULAR; INTRAVENOUS at 05:39

## 2018-02-20 RX ADMIN — SODIUM CHLORIDE 75 MILLILITER(S): 9 INJECTION, SOLUTION INTRAVENOUS at 14:24

## 2018-02-20 RX ADMIN — Medication 1 PATCH: at 11:55

## 2018-02-20 RX ADMIN — SODIUM CHLORIDE 4 MILLILITER(S): 9 INJECTION INTRAMUSCULAR; INTRAVENOUS; SUBCUTANEOUS at 09:32

## 2018-02-20 RX ADMIN — Medication 10 MILLIGRAM(S): at 13:03

## 2018-02-20 RX ADMIN — CITALOPRAM 5 MILLIGRAM(S): 10 TABLET, FILM COATED ORAL at 22:54

## 2018-02-20 RX ADMIN — CARVEDILOL PHOSPHATE 3.12 MILLIGRAM(S): 80 CAPSULE, EXTENDED RELEASE ORAL at 17:48

## 2018-02-20 RX ADMIN — Medication 40 MILLIEQUIVALENT(S): at 11:55

## 2018-02-20 RX ADMIN — LOSARTAN POTASSIUM 50 MILLIGRAM(S): 100 TABLET, FILM COATED ORAL at 05:39

## 2018-02-20 RX ADMIN — Medication 25 MICROGRAM(S): at 05:39

## 2018-02-20 RX ADMIN — Medication 1: at 08:57

## 2018-02-20 RX ADMIN — Medication 1: at 11:58

## 2018-02-20 RX ADMIN — SODIUM CHLORIDE 75 MILLILITER(S): 9 INJECTION, SOLUTION INTRAVENOUS at 22:58

## 2018-02-20 RX ADMIN — PANTOPRAZOLE SODIUM 40 MILLIGRAM(S): 20 TABLET, DELAYED RELEASE ORAL at 11:56

## 2018-02-20 RX ADMIN — Medication 81 MILLIGRAM(S): at 11:55

## 2018-02-20 RX ADMIN — CARVEDILOL PHOSPHATE 3.12 MILLIGRAM(S): 80 CAPSULE, EXTENDED RELEASE ORAL at 05:39

## 2018-02-20 RX ADMIN — SODIUM CHLORIDE 4 MILLILITER(S): 9 INJECTION INTRAMUSCULAR; INTRAVENOUS; SUBCUTANEOUS at 02:37

## 2018-02-20 RX ADMIN — CHLORHEXIDINE GLUCONATE 1 APPLICATION(S): 213 SOLUTION TOPICAL at 11:55

## 2018-02-20 RX ADMIN — SODIUM CHLORIDE 50 MILLILITER(S): 9 INJECTION, SOLUTION INTRAVENOUS at 05:39

## 2018-02-20 RX ADMIN — ATORVASTATIN CALCIUM 20 MILLIGRAM(S): 80 TABLET, FILM COATED ORAL at 22:47

## 2018-02-20 NOTE — CONSULT NOTE ADULT - CONSULT REQUESTED BY NAME
Brandi Ruiz
Dr Paz .. [facility] medicine attending
Dr. Cid
Dr. Sharif
ICU
Dr. Paz ( Covering Dr. Sharif)
[place]

## 2018-02-20 NOTE — PROGRESS NOTE ADULT - PROBLEM SELECTOR PLAN 1
PABLO hemodynamically mediated in the setting of infection/ hypotension. n  Renal function over all improving. Unknown baseline SCr.  Defer to primary team to obtain baseline renal function from PMD.  Strict I/Os. Avoid nephrotoxins/ NSAIDs/ RCA. Monitor BMP. PABLO hemodynamically mediated in the setting of infection/ hypotension. PABLO resolving. Unknown baseline SCr.  Defer to primary team to obtain baseline renal function from PMD.  Strict I/Os. Avoid nephrotoxins/ NSAIDs/ RCA. Monitor BMP.

## 2018-02-20 NOTE — SWALLOW BEDSIDE ASSESSMENT ADULT - COMMENTS
Pt awake & alert. Followed some simple directions w/max cues. Confused. Verbal, but responded w/ "I'm ok," and "no" to simple questions. Vocal quality hoarse at baseline. Needed extra time to swallow each bolus. Vocal quality post swallow strained 1/4 times.

## 2018-02-20 NOTE — CONSULT NOTE ADULT - SUBJECTIVE AND OBJECTIVE BOX
77 F from home, lives with friend PMH of MS, HTN, HLD , MS, CVA with left hemiparesis, bed bound at baseline was brought in by friend for respiratory distress, admitted for respiratory failure secondary to flu.  Intubated initially, now extubated and downgraded to the regular medical floor. She is DNR/DNI and HCP is interested in hospice services, per primary team.    PAST MEDICAL & SURGICAL HISTORY:  MS (multiple sclerosis)  CVA (cerebral vascular accident)  Diabetes mellitus  Hypertension  Hypothyroidism    SOCIAL HISTORY:    Admitted from:  home, lives with friend   Substance abuse history:              Tobacco hx: Unknown                 Alcohol hx:   None           Home Opioid hx: None  Sabianism: Unknown                                   Preferred Language: English    Surrogate/HCP/Guardian: Paulo Willams (HCP) [phone]             FAMILY HISTORY: Unknown    Baseline ADLs (prior to admission): Bedbound    Allergies:  Allergy Status Unknown          Review of Systems: Unable to obtain due to poor mentation    MEDICATIONS  (STANDING):  aspirin  chewable 81 milliGRAM(s) Oral daily  atorvastatin 20 milliGRAM(s) Oral at bedtime  baclofen 10 milliGRAM(s) Oral three times a day  carvedilol 3.125 milliGRAM(s) Oral every 12 hours  chlorhexidine 4% Liquid 1 Application(s) Topical daily  citalopram 5 milliGRAM(s) Oral at bedtime  cloNIDine Patch 0.1 mG/24Hr(s) 1 patch Topical <User Schedule>  dextrose 5%. 1000 milliLiter(s) (50 mL/Hr) IV Continuous <Continuous>  donepezil 10 milliGRAM(s) Oral at bedtime  heparin  Injectable 5000 Unit(s) SubCutaneous every 12 hours  insulin lispro (HumaLOG) corrective regimen sliding scale   SubCutaneous Before meals and at bedtime  levoFLOXacin IVPB 250 milliGRAM(s) IV Intermittent every 24 hours  levoFLOXacin IVPB      levothyroxine 25 MICROGram(s) Oral daily  losartan 50 milliGRAM(s) Oral daily  nystatin Powder 1 Application(s) Topical daily  pantoprazole  Injectable 40 milliGRAM(s) IV Push daily  potassium chloride   Powder 40 milliEquivalent(s) Oral every 4 hours  raloxifene 60 milliGRAM(s) Oral daily  sodium chloride 3%  Inhalation 4 milliLiter(s) Inhalation every 6 hours    MEDICATIONS  (PRN):  acetaminophen    Suspension 650 milliGRAM(s) Oral every 6 hours PRN For Temp greater than 38 C (100.4 F)  glycopyrrolate Injectable 0.2 milliGRAM(s) IV Push every 6 hours PRN Secretions  labetalol Injectable 10 milliGRAM(s) IV Push every 8 hours PRN Systolic/Diastolic >  morphine  - Injectable 0.5 milliGRAM(s) IV Push every 3 hours PRN Dyspnea    PHYSICAL EXAM:    Vital Signs Last 24 Hrs  T(C): 36.8 (2018 07:46), Max: 37 (2018 23:22)  T(F): 98.3 (2018 07:46), Max: 98.6 (2018 23:22)  HR: 110 (2018 07:46) (102 - 110)  BP: 136/95 (2018 07:46) (136/95 - 154/86)  RR: 16 (2018 07:46) (16 - 18)  SpO2: 94% (2018 07:46) (91% - 97%)    General: alert  oriented x __1-2__    lethargic, minimally verbal  Karnofsky Performance Score/Palliative Performance Status Version2: 30%    HEENT: Dry mouth   Lungs: comfortable   CV: Tachycardia  GI: Incontinent   : Incontinent  Musculoskeletal: Weakness, bedbound/wheelchair bound  Skin: normal    Neuro: Cognitive impairment   Oral intake ability: Minimal   Diet: Dysphagia    LABS:                        9.2    8.7   )-----------( 341      ( 2018 08:53 )             29.6     02-20    150<H>  |  113<H>  |  23<H>  ----------------------------<  193<H>  3.3<L>   |  30  |  1.23    Ca    7.9<L>      2018 08:53  Phos  2.0     02-19  Mg     1.8     02-19    Urinalysis Basic - ( 2018 09:11 )    Color: Yellow / Appearance: Clear / S.010 / pH: x  Gluc: x / Ketone: Negative  / Bili: Negative / Urobili: Negative   Blood: x / Protein: 30 mg/dL / Nitrite: Negative   Leuk Esterase: Trace / RBC: 5-10 /HPF / WBC 3-5 /HPF   Sq Epi: x / Non Sq Epi: Few /HPF / Bacteria: Moderate /HPF    ADVANCE DIRECTIVES: DNR/DNI

## 2018-02-20 NOTE — PROGRESS NOTE ADULT - PROBLEM SELECTOR PLAN 1
S/P Acute respiratory failure  Secondary to Influenza and Pneumonia  C/W Antibiotics  Completed Tamiflu

## 2018-02-20 NOTE — CONSULT NOTE ADULT - PROBLEM SELECTOR RECOMMENDATION 3
Patient is bedbound, completely dependent on ADLs, requires total care
BP elevated, Consider increasing Coreg. Monitor BP.
improving renal function  c/w abx and fluid  avoid nephrotoxic rx  monitor renal function  f/u nephro recs -

## 2018-02-20 NOTE — PROGRESS NOTE ADULT - SUBJECTIVE AND OBJECTIVE BOX
Patient seen and examined.       MEDICATIONS  (STANDING):  aspirin  chewable 81 milliGRAM(s) Oral daily  atorvastatin 20 milliGRAM(s) Oral at bedtime  baclofen 10 milliGRAM(s) Oral three times a day  carvedilol 3.125 milliGRAM(s) Oral every 12 hours  chlorhexidine 4% Liquid 1 Application(s) Topical daily  citalopram 5 milliGRAM(s) Oral at bedtime  cloNIDine Patch 0.1 mG/24Hr(s) 1 patch Topical <User Schedule>  dextrose 5%. 1000 milliLiter(s) (75 mL/Hr) IV Continuous <Continuous>  donepezil 10 milliGRAM(s) Oral at bedtime  heparin  Injectable 5000 Unit(s) SubCutaneous every 12 hours  insulin lispro (HumaLOG) corrective regimen sliding scale   SubCutaneous Before meals and at bedtime  levoFLOXacin IVPB 250 milliGRAM(s) IV Intermittent every 24 hours  levoFLOXacin IVPB      levothyroxine 25 MICROGram(s) Oral daily  losartan 50 milliGRAM(s) Oral daily  nystatin Powder 1 Application(s) Topical daily  pantoprazole  Injectable 40 milliGRAM(s) IV Push daily  raloxifene 60 milliGRAM(s) Oral daily  sodium chloride 3%  Inhalation 4 milliLiter(s) Inhalation every 6 hours      MEDICATIONS  (PRN):  acetaminophen    Suspension 650 milliGRAM(s) Oral every 6 hours PRN For Temp greater than 38 C (100.4 F)  glycopyrrolate Injectable 0.2 milliGRAM(s) IV Push every 6 hours PRN Secretions  labetalol Injectable 10 milliGRAM(s) IV Push every 8 hours PRN Systolic/Diastolic >  morphine  - Injectable 0.5 milliGRAM(s) IV Push every 3 hours PRN Dyspnea   Medications up to date at time of exam.      PHYSICAL EXAMINATION:  Patient has no new complaints.  GENERAL: The patient is a well-developed, well-nourished, in no apparent distress.     Vital Signs Last 24 Hrs  T(C): 37.2 (2018 15:40), Max: 37.2 (2018 15:40)  T(F): 98.9 (2018 15:40), Max: 98.9 (2018 15:40)  HR: 83 (2018 15:40) (83 - 110)  BP: 176/73 (2018 15:40) (136/95 - 176/73)  BP(mean): --  RR: 16 (2018 15:40) (16 - 18)  SpO2: 94% (2018 15:40) (94% - 97%)   (if applicable)      HEENT: Head is normocephalic and atraumatic.     NECK: Supple, no palpable adenopathy.    LUNGS: Clear to auscultation, no wheezing, rales, or rhonchi.    HEART: Regular rate and rhythm without murmur.    ABDOMEN: Soft, nontender, and nondistended.  No hepatosplenomegaly is noted.    EXTREMITIES: Without any cyanosis, clubbing, rash, lesions or edema.    NEUROLOGIC: Awake, alert.    SKIN: Warm, dry, good turgor.      LABS:                        9.2    8.7   )-----------( 341      ( 2018 08:53 )             29.6     02-20    150<H>  |  113<H>  |  23<H>  ----------------------------<  193<H>  3.3<L>   |  30  |  1.23    Ca    7.9<L>      2018 08:53  Phos  2.0     02-19  Mg     1.8     02-19        Urinalysis Basic - ( 2018 09:11 )    Color: Yellow / Appearance: Clear / S.010 / pH: x  Gluc: x / Ketone: Negative  / Bili: Negative / Urobili: Negative   Blood: x / Protein: 30 mg/dL / Nitrite: Negative   Leuk Esterase: Trace / RBC: 5-10 /HPF / WBC 3-5 /HPF   Sq Epi: x / Non Sq Epi: Few /HPF / Bacteria: Moderate /HPF        MICROBIOLOGY: (if applicable)    RADIOLOGY & ADDITIONAL STUDIES:  EKG:   CXR:  ECHO:    IMPRESSION: 77y Female PAST MEDICAL & SURGICAL HISTORY:  MS (multiple sclerosis)  CVA (cerebral vascular accident)  Diabetes mellitus  Hypertension  Hypothyroidism       Patient presents with SOB, respiratory failure due to flu, increased troponins due to demand ischemia.   Patient with intermittent tachycardia, in setting of infection, improving  2D echo shows grossly normal EF.  Can titrate coreg if BP continues to increase.

## 2018-02-20 NOTE — SWALLOW BEDSIDE ASSESSMENT ADULT - SWALLOW EVAL: RECOMMENDED FEEDING/EATING TECHNIQUES
position upright (90 degrees)/small sips/bites/alternate food with liquid/allow for swallow between intakes/check mouth frequently for oral residue/pocketing/crush medication (when feasible)/maintain upright posture during/after eating for 30 mins/no straws/oral hygiene

## 2018-02-20 NOTE — PROGRESS NOTE ADULT - SUBJECTIVE AND OBJECTIVE BOX
Time of Visit:  Patient seen and examined.     MEDICATIONS  (STANDING):  aspirin  chewable 81 milliGRAM(s) Oral daily  atorvastatin 20 milliGRAM(s) Oral at bedtime  baclofen 10 milliGRAM(s) Oral three times a day  carvedilol 3.125 milliGRAM(s) Oral every 12 hours  chlorhexidine 4% Liquid 1 Application(s) Topical daily  citalopram 5 milliGRAM(s) Oral at bedtime  cloNIDine Patch 0.1 mG/24Hr(s) 1 patch Topical <User Schedule>  dextrose 5%. 1000 milliLiter(s) (75 mL/Hr) IV Continuous <Continuous>  donepezil 10 milliGRAM(s) Oral at bedtime  heparin  Injectable 5000 Unit(s) SubCutaneous every 12 hours  insulin lispro (HumaLOG) corrective regimen sliding scale   SubCutaneous Before meals and at bedtime  levoFLOXacin IVPB 250 milliGRAM(s) IV Intermittent every 24 hours  levoFLOXacin IVPB      levothyroxine 25 MICROGram(s) Oral daily  losartan 50 milliGRAM(s) Oral daily  nystatin Powder 1 Application(s) Topical daily  pantoprazole  Injectable 40 milliGRAM(s) IV Push daily  potassium chloride   Powder 40 milliEquivalent(s) Oral every 4 hours  raloxifene 60 milliGRAM(s) Oral daily  sodium chloride 3%  Inhalation 4 milliLiter(s) Inhalation every 6 hours      MEDICATIONS  (PRN):  acetaminophen    Suspension 650 milliGRAM(s) Oral every 6 hours PRN For Temp greater than 38 C (100.4 F)  glycopyrrolate Injectable 0.2 milliGRAM(s) IV Push every 6 hours PRN Secretions  labetalol Injectable 10 milliGRAM(s) IV Push every 8 hours PRN Systolic/Diastolic >  morphine  - Injectable 0.5 milliGRAM(s) IV Push every 3 hours PRN Dyspnea       Medications up to date at time of exam.    ROS; No fever, chills, congestion, SOB. Has non productive cough.   PHYSICAL EXAMINATION:      Vital Signs Last 24 Hrs  T(C): 36.8 (2018 07:46), Max: 37 (2018 23:22)  T(F): 98.3 (2018 07:46), Max: 98.6 (2018 23:22)  HR: 110 (2018 07:46) (102 - 110)  BP: 136/95 (2018 07:46) (136/95 - 154/86)  BP(mean): --  RR: 16 (2018 07:46) (16 - 18)  SpO2: 94% (2018 07:46) (91% - 97%)   (if applicable)    General; Alert and oriented. No acute distress.    HEENT: Normocephalic and atraumatic. No nasal tenderness. Moist mucosa.     NECK: Supple, no palpable adenopathy.    LUNGS: Clear to auscultation, no wheezing, rales, or rhonchi. No use of accessory muscle.    HEART:  S1 S2 Regular rate and no click/ rub.    ABDOMEN: Soft, nontender, and nondistended.  No hepatosplenomegaly is noted. Active bowel sounds.    EXTREMITIES: Without any cyanosis, clubbing, rash, lesions or edema.    NEUROLOGIC: Awake, alert, oriented.     SKIN: Warm and moist. Non diaphoretic.       LABS:                        9.2    8.7   )-----------( 341      ( 2018 08:53 )             29.6     02-20    150<H>  |  113<H>  |  23<H>  ----------------------------<  193<H>  3.3<L>   |  30  |  1.23    Ca    7.9<L>      2018 08:53  Phos  2.0     02-19  Mg     1.8     -19        Urinalysis Basic - ( 2018 09:11 )    Color: Yellow / Appearance: Clear / S.010 / pH: x  Gluc: x / Ketone: Negative  / Bili: Negative / Urobili: Negative   Blood: x / Protein: 30 mg/dL / Nitrite: Negative   Leuk Esterase: Trace / RBC: 5-10 /HPF / WBC 3-5 /HPF   Sq Epi: x / Non Sq Epi: Few /HPF / Bacteria: Moderate /HPF    RADIOLOGY & ADDITIONAL STUDIES:  EKG:   CXR: < from: Xray Chest 1 View- PORTABLE-Routine (18 @ 07:10) >  NTERPRETATION:  AP chest on 2018 at 6:29 AM. Patient is   extubated after respiratory failure and hypoxia.    Poor inspiration crowds the chest. The heart is magnified by technique.    Endotracheal tube and nasogastric tube seen on February 15 have been   removed.    There are mild basilar effusions possibly with some adjacent atelectasis   but this has shown significant improvementparticularly on the left   compared to February 15.    IMPRESSION: Improved basilar findings. Tubes removed.       PAST MEDICAL & SURGICAL HISTORY:  MS (multiple sclerosis)  CVA (cerebral vascular accident)  Diabetes mellitus  Hypertension  Hypothyroidism     Impression; 78 Y/O Female with Acute Respiratory failure due to Influenza requiring intubation. Now extubated from ICU and on Medicine Unit. Has + RVP and completed Tamiflu.     Suggestion;    Oxygen supplementation to keep O2 saturation >90%, O 2saturation 96%.   Continue antibiotic.  Aspiration precautions.   DVT/ GI prophylaxis.

## 2018-02-20 NOTE — PROGRESS NOTE ADULT - ASSESSMENT
A 77 F from home, lives with friend PMH of MS, HTN, HLD , MS, CVA with left hemiparesis , was brought in to the ER on 2/12/18,by friend for respiratory distress.  EMS arrived found her to be in respiratory distress with low sPO2 and accessory muscle use , endotracheal intubation was attempted twice with 40 mg of etomidate but failed , oral airway was inserted and ventilation via BMV were continued. She found to have positive Influenza B and Pneumonia. She has completed the course of Influenza B and on Ceftriaxone for Pneumonia. On admission urine culture grew ESBL  E.coli in the setting of negative Urine analysis. Hence, Ceftriaxone change to Levaquin base don C & S, and The ID consult requested to assist with further antibiotic management.    # Influenza B - s/p tamiflu  # s/p Respiratory failure- s/p extubation  # Pneumonia- resolving  # ESBL  E.coli in Urine culture- Most likely a contaminant In the setting of Negative Urine analysis    Would recommend:  1. Reevaluate the need of mcfadden catheter  2. Continue Levaquin until 2/22/18 to complete the course of Pneumonia  3. Repeat Urine analysis  4. Discontinue Ceftriaxone   4. Aspiration precaution    d/w Patient and Nursing  staff    will follow the patient with you A 77 F from home, lives with friend PMH of MS, HTN, HLD , MS, CVA with left hemiparesis , was brought in to the ER on 2/12/18,by friend for respiratory distress.  EMS arrived found her to be in respiratory distress with low sPO2 and accessory muscle use , endotracheal intubation was attempted twice with 40 mg of etomidate but failed , oral airway was inserted and ventilation via BMV were continued. She found to have positive Influenza B and Pneumonia. She has completed the course of Influenza B and on Ceftriaxone for Pneumonia. On admission urine culture grew ESBL  E.coli in the setting of negative Urine analysis. Hence, Ceftriaxone change to Levaquin base don C & S, and The ID consult requested to assist with further antibiotic management.    # Influenza B - s/p tamiflu  # s/p Respiratory failure- s/p extubation  # Pneumonia- resolving  # ESBL  E.coli in Urine culture- Most likely a contaminant In the setting of Negative Urine analysis    Would recommend:  1. Reevaluate the need of mcfadden catheter  2. Continue Levaquin until 2/22/18 to complete the course of Pneumonia  3. Aspiration precaution    d/w Patient and Nursing  staff    will follow the patient with you

## 2018-02-20 NOTE — SWALLOW BEDSIDE ASSESSMENT ADULT - SLP PERTINENT HISTORY OF CURRENT PROBLEM
78 yo F with MS, HTN, HLD , MS, CVA with left hemiparesis, bed bound at baseline admitted to MICU with acute respiratory failure s/p trach 2/2 Influenza B/ Acute bronchitis, PABLO, Elevated troponins 2/2 demand Ischemia and ESBL UTI. Renal consulted for elevated SCr.

## 2018-02-20 NOTE — SWALLOW BEDSIDE ASSESSMENT ADULT - ASR SWALLOW ASPIRATION MONITOR
oral hygiene/gurgly voice/change of breathing pattern/position upright (90Y)/fever/throat clearing/pneumonia/upper respiratory infection/cough

## 2018-02-20 NOTE — SWALLOW BEDSIDE ASSESSMENT ADULT - PHARYNGEAL PHASE
Audible swallows/Delayed pharyngeal swallow/Decreased laryngeal elevation Audible swallows/Decreased laryngeal elevation/Delayed pharyngeal swallow Delayed pharyngeal swallow/Multiple swallows/Audible swallows/Decreased laryngeal elevation Delayed pharyngeal swallow/Cough post oral intake/Decreased laryngeal elevation Delayed pharyngeal swallow/Decreased laryngeal elevation/Cough post oral intake

## 2018-02-20 NOTE — CONSULT NOTE ADULT - PROBLEM SELECTOR RECOMMENDATION 4
Patient would qualify for home/residential hospice services. Left message for HCP Paulo Willams.  Need to complete MOLST prior to discharge.  Patient's HCP and living will are noted in the chart. Patient would qualify for home/residential hospice services. Left message for HCP Paulo Willams.  Need to complete MOLST prior to discharge.  Patient's HCP and living will are noted in the chart. Patient is DNR/DNI

## 2018-02-20 NOTE — PROGRESS NOTE ADULT - PROBLEM SELECTOR PLAN 2
in the setting of poor PO intake. Water deficit 2L.   Recc to increase D5 to 75 cc/hr.   Monitor serum Na.

## 2018-02-20 NOTE — CONSULT NOTE ADULT - PROBLEM SELECTOR PROBLEM 2
Respiratory failure
CVA (cerebral vascular accident)
Hypernatremia
UTI due to extended-spectrum beta lactamase (ESBL) producing Escherichia coli

## 2018-02-20 NOTE — CONSULT NOTE ADULT - PROBLEM SELECTOR PROBLEM 3
Functional quadriplegia
MS (multiple sclerosis)
ARF (acute renal failure)
Essential hypertension, benign

## 2018-02-20 NOTE — PROGRESS NOTE ADULT - SUBJECTIVE AND OBJECTIVE BOX
==================PGY 1 Note===================   Discussed with supervising resident and primary attending    ================CHIEF COMPLAINT===============  Patient is a 77y old  Female who presents with a chief complaint of Respiratory failure (2018 10:41)        =========INTERVAL HPI/OVERNIGHT EVENTS=========  Offers no new complaints      ============CURRENT MEDICATIONS===============    MEDICATIONS  (STANDING):  aspirin  chewable 81 milliGRAM(s) Oral daily  atorvastatin 20 milliGRAM(s) Oral at bedtime  baclofen 10 milliGRAM(s) Oral three times a day  carvedilol 3.125 milliGRAM(s) Oral every 12 hours  chlorhexidine 4% Liquid 1 Application(s) Topical daily  citalopram 5 milliGRAM(s) Oral at bedtime  cloNIDine Patch 0.1 mG/24Hr(s) 1 patch Topical <User Schedule>  dextrose 5%. 1000 milliLiter(s) (50 mL/Hr) IV Continuous <Continuous>  donepezil 10 milliGRAM(s) Oral at bedtime  heparin  Injectable 5000 Unit(s) SubCutaneous every 12 hours  insulin lispro (HumaLOG) corrective regimen sliding scale   SubCutaneous Before meals and at bedtime  levoFLOXacin IVPB      levoFLOXacin IVPB 250 milliGRAM(s) IV Intermittent every 24 hours  levothyroxine 25 MICROGram(s) Oral daily  losartan 50 milliGRAM(s) Oral daily  nystatin Powder 1 Application(s) Topical daily  pantoprazole  Injectable 40 milliGRAM(s) IV Push daily  raloxifene 60 milliGRAM(s) Oral daily  sodium chloride 3%  Inhalation 4 milliLiter(s) Inhalation every 6 hours    MEDICATIONS  (PRN):  acetaminophen    Suspension 650 milliGRAM(s) Oral every 6 hours PRN For Temp greater than 38 C (100.4 F)  glycopyrrolate Injectable 0.2 milliGRAM(s) IV Push every 6 hours PRN Secretions  labetalol Injectable 10 milliGRAM(s) IV Push every 8 hours PRN Systolic/Diastolic >  morphine  - Injectable 0.5 milliGRAM(s) IV Push every 3 hours PRN Dyspnea        ============REVIEW OF SYSTEMS==================    CONSTITUTIONAL: No fever  EYES: no acute visual disturbances  NECK: No pain or stiffness  RESPIRATORY: No cough; No shortness of breath  CARDIOVASCULAR: No chest pain, no palpitations  GASTROINTESTINAL: No pain. No nausea or vomiting; No diarrhea   NEUROLOGICAL: No headache or numbness, no tremors  MUSCULOSKELETAL: No joint pain, no muscle pain  GENITOURINARY: no dysuria, no frequency, no hesitancy  PSYCHIATRY: no depression , no anxiety  ALL OTHER  ROS negative      ================VITALS SIGNS=====================  Vital Signs Last 24 Hrs  T(C): 36.8 (2018 07:46), Max: 37 (2018 23:22)  T(F): 98.3 (2018 07:46), Max: 98.6 (2018 23:22)  HR: 110 (2018 07:46) (102 - 110)  BP: 136/95 (2018 07:46) (136/95 - 154/86)  BP(mean): --  RR: 16 (2018 07:46) (16 - 18)  SpO2: 94% (2018 07:46) (91% - 97%)    ===============PHYSICAL EXAM====================    GENERAL: NAD  HEENT: Normocephalic;  conjunctivae and sclerae clear; moist mucous membranes;   NECK : supple  CHEST/LUNG: Clear to auscultation bilaterally with good air entry   HEART: S1 S2  regular; no murmurs, gallops or rubs  ABDOMEN: Soft, Nontender, Nondistended; Bowel sounds present  EXTREMITIES: no cyanosis; no edema; no calf tenderness  SKIN: warm and dry; no rash  NERVOUS SYSTEM:  Awake and alert. Oriented to person    ==============LABORATORIES======================  LABS:                        9.5    8.0   )-----------( 300      ( 2018 05:54 )             30.2         150<H>  |  116<H>  |  31<H>  ----------------------------<  248<H>  3.7   |  28  |  1.55<H>    Ca    7.7<L>      2018 05:54  Phos  2.0       Mg     1.8             Urinalysis Basic - ( 2018 09:11 )    Color: Yellow / Appearance: Clear / S.010 / pH: x  Gluc: x / Ketone: Negative  / Bili: Negative / Urobili: Negative   Blood: x / Protein: 30 mg/dL / Nitrite: Negative   Leuk Esterase: Trace / RBC: x / WBC x   Sq Epi: x / Non Sq Epi: x / Bacteria: x      CAPILLARY BLOOD GLUCOSE      POCT Blood Glucose.: 191 mg/dL (2018 08:39)  POCT Blood Glucose.: 190 mg/dL (2018 22:08)  POCT Blood Glucose.: 207 mg/dL (2018 21:02)  POCT Blood Glucose.: 263 mg/dL (2018 16:38)  POCT Blood Glucose.: 183 mg/dL (2018 11:55)      =============INPUTS/OUPUTS=====================    18 @ 07:01  -  18 @ 07:00  --------------------------------------------------------  IN: 0 mL / OUT: 1500 mL / NET: -1500 mL          RADIOLOGY & ADDITIONAL TESTS:    Imaging Personally Reviewed:  YES    Consultant(s) Notes Reviewed:   YES    Care Discussed with Consultants : YES    Plan of care was discussed with patient  and /or primary care giver; all questions and concerns were addressed and care was aligned with patient's wishes. Time was allowed for questions that were answered to the best of my abilities ==================PGY 1 Note===================   Discussed with supervising resident and primary attending    ================CHIEF COMPLAINT===============  Patient is a 77y old  Female who presents with a chief complaint of Respiratory failure (2018 10:41)        =========INTERVAL HPI/OVERNIGHT EVENTS=========  Offers no new complaints      ============CURRENT MEDICATIONS===============    MEDICATIONS  (STANDING):  aspirin  chewable 81 milliGRAM(s) Oral daily  atorvastatin 20 milliGRAM(s) Oral at bedtime  baclofen 10 milliGRAM(s) Oral three times a day  carvedilol 3.125 milliGRAM(s) Oral every 12 hours  chlorhexidine 4% Liquid 1 Application(s) Topical daily  citalopram 5 milliGRAM(s) Oral at bedtime  cloNIDine Patch 0.1 mG/24Hr(s) 1 patch Topical <User Schedule>  dextrose 5%. 1000 milliLiter(s) (50 mL/Hr) IV Continuous <Continuous>  donepezil 10 milliGRAM(s) Oral at bedtime  heparin  Injectable 5000 Unit(s) SubCutaneous every 12 hours  insulin lispro (HumaLOG) corrective regimen sliding scale   SubCutaneous Before meals and at bedtime  levoFLOXacin IVPB      levoFLOXacin IVPB 250 milliGRAM(s) IV Intermittent every 24 hours  levothyroxine 25 MICROGram(s) Oral daily  losartan 50 milliGRAM(s) Oral daily  nystatin Powder 1 Application(s) Topical daily  pantoprazole  Injectable 40 milliGRAM(s) IV Push daily  raloxifene 60 milliGRAM(s) Oral daily  sodium chloride 3%  Inhalation 4 milliLiter(s) Inhalation every 6 hours    MEDICATIONS  (PRN):  acetaminophen    Suspension 650 milliGRAM(s) Oral every 6 hours PRN For Temp greater than 38 C (100.4 F)  glycopyrrolate Injectable 0.2 milliGRAM(s) IV Push every 6 hours PRN Secretions  labetalol Injectable 10 milliGRAM(s) IV Push every 8 hours PRN Systolic/Diastolic >  morphine  - Injectable 0.5 milliGRAM(s) IV Push every 3 hours PRN Dyspnea        ============REVIEW OF SYSTEMS==================    CONSTITUTIONAL: No fever  EYES: no acute visual disturbances  NECK: No pain or stiffness  RESPIRATORY: No cough; No shortness of breath  CARDIOVASCULAR: No chest pain, no palpitations  GASTROINTESTINAL: No pain. No nausea or vomiting; No diarrhea   NEUROLOGICAL: No headache or numbness, no tremors  MUSCULOSKELETAL: No joint pain, no muscle pain  GENITOURINARY: no dysuria, no frequency, no hesitancy  PSYCHIATRY: no depression , no anxiety  ALL OTHER  ROS negative      ================VITALS SIGNS=====================  Vital Signs Last 24 Hrs  T(C): 36.8 (2018 07:46), Max: 37 (2018 23:22)  T(F): 98.3 (2018 07:46), Max: 98.6 (2018 23:22)  HR: 110 (2018 07:46) (102 - 110)  BP: 136/95 (2018 07:46) (136/95 - 154/86)  BP(mean): --  RR: 16 (2018 07:46) (16 - 18)  SpO2: 94% (2018 07:46) (91% - 97%)    ===============PHYSICAL EXAM====================    GENERAL: NAD  HEENT: Normocephalic;  conjunctivae and sclerae clear; moist mucous membranes;   NECK : supple  CHEST/LUNG: Clear to auscultation bilaterally with good air entry   HEART: S1 S2  regular; no murmurs, gallops or rubs  ABDOMEN: Soft, Nontender, Nondistended; Bowel sounds present  EXTREMITIES: no cyanosis; no edema; no calf tenderness  SKIN: warm and dry; no rash  NERVOUS SYSTEM:  Awake and alert. Oriented to person only    ==============LABORATORIES======================  LABS:                        9.5    8.0   )-----------( 300      ( 2018 05:54 )             30.2         150<H>  |  116<H>  |  31<H>  ----------------------------<  248<H>  3.7   |  28  |  1.55<H>    Ca    7.7<L>      2018 05:54  Phos  2.0       Mg     1.8             Urinalysis Basic - ( 2018 09:11 )    Color: Yellow / Appearance: Clear / S.010 / pH: x  Gluc: x / Ketone: Negative  / Bili: Negative / Urobili: Negative   Blood: x / Protein: 30 mg/dL / Nitrite: Negative   Leuk Esterase: Trace / RBC: x / WBC x   Sq Epi: x / Non Sq Epi: x / Bacteria: x      CAPILLARY BLOOD GLUCOSE      POCT Blood Glucose.: 191 mg/dL (2018 08:39)  POCT Blood Glucose.: 190 mg/dL (2018 22:08)  POCT Blood Glucose.: 207 mg/dL (2018 21:02)  POCT Blood Glucose.: 263 mg/dL (2018 16:38)  POCT Blood Glucose.: 183 mg/dL (2018 11:55)      =============INPUTS/OUPUTS=====================    18 @ 07:01  -  18 @ 07:00  --------------------------------------------------------  IN: 0 mL / OUT: 1500 mL / NET: -1500 mL          RADIOLOGY & ADDITIONAL TESTS:    Imaging Personally Reviewed:  YES    Consultant(s) Notes Reviewed:   YES    Care Discussed with Consultants : YES    Plan of care was discussed with patient  and /or primary care giver; all questions and concerns were addressed and care was aligned with patient's wishes. Time was allowed for questions that were answered to the best of my abilities

## 2018-02-20 NOTE — CONSULT NOTE ADULT - PROBLEM SELECTOR PROBLEM 4
Palliative care encounter
Hypernatremia
UTI due to extended-spectrum beta lactamase (ESBL) producing Escherichia coli

## 2018-02-20 NOTE — CONSULT NOTE ADULT - PROBLEM SELECTOR RECOMMENDATION 9
Patient is bedbound,  very debilitated, completely dependent on ADLs
PABLO hemodynamically mediated in the setting of infection/ hypotension. n  Renal function over all improving. Unknown baseline SCr.  Defer to primary team to obtain baseline renal function from PMD.  Strict I/Os. Avoid nephrotoxins/ NSAIDs/ RCA. Monitor BMP.
c/w supplemental oxygen prn to maintain spo2 > 90  nebs  supportive care  chest pt  f/u pulm recs -   f/u cardio recs -   f/u palliative/hospice recs -

## 2018-02-20 NOTE — CONSULT NOTE ADULT - PROBLEM SELECTOR RECOMMENDATION 2
Secondary to influenza, now extubated, downgraded from medical floor; per HCP, she is now DNR/DNI; breathing well, PRN Robinul for secretions
(+) UCx  d/c rocephin  start levaquin  isolation precautions  f/u id recs - dr.amin
in the setting of poor PO intake. Water deficit 2L.   Recc to increase D5 to 75 cc/hr.   Monitor serum Na.

## 2018-02-20 NOTE — PROGRESS NOTE ADULT - ASSESSMENT
Patient is a 76 yo F with MS, HTN, HLD , MS, CVA with left hemiparesis, bed bound at baseline admitted to MICU with acute respiratory failure s/p trach 2/2 Influenza B/ Acute bronchitis, PABLO, Elevated troponins 2/2 demand Ischemia and ESBL UTI. Renal consulted for elevated SCr.

## 2018-02-20 NOTE — PROGRESS NOTE ADULT - SUBJECTIVE AND OBJECTIVE BOX
Patient is seen and examined at the bed side, is afebrile. She has no complaints, mcfadden catheter in placed.        REVIEW OF SYSTEMS: All other review systems are negative          Vital Signs Last 24 Hrs  T(C): 37.2 (20 Feb 2018 15:40), Max: 37.2 (20 Feb 2018 15:40)  T(F): 98.9 (20 Feb 2018 15:40), Max: 98.9 (20 Feb 2018 15:40)  HR: 82 (20 Feb 2018 18:53) (82 - 110)  BP: 158/62 (20 Feb 2018 18:53) (136/95 - 176/73)  BP(mean): --  RR: 16 (20 Feb 2018 18:53) (16 - 18)  SpO2: 94% (20 Feb 2018 18:53) (94% - 97%)        PHYSICAL EXAM:  GENERAL: Not in distress  CVS: s1 and s2 present   RESP: Air entry B/L  GI: Abdomen soft and nontender  : Mcfadden catheter in palced  EXT: No pedal edema  CNS:  Awake and alert        ALLERGIES: NKDA          LABS:                        9.2    8.7   )-----------( 341      ( 20 Feb 2018 08:53 )             29.6                           9.5    8.0   )-----------( 300      ( 19 Feb 2018 05:54 )             30.2                 02-20    150<H>  |  113<H>  |  23<H>  ----------------------------<  193<H>  3.3<L>   |  30  |  1.23    Ca    7.9<L>      20 Feb 2018 08:53  Phos  2.0     02-19  Mg     1.8     02-19      02-19    150<H>  |  116<H>  |  31<H>  ----------------------------<  248<H>  3.7   |  28  |  1.55<H>    Ca    7.7<L>      19 Feb 2018 05:54  Phos  2.0     02-19  Mg     1.8     02-19      CAPILLARY BLOOD GLUCOSE      POCT Blood Glucose.: 158 mg/dL (18 Feb 2018 17:17)  POCT Blood Glucose.: 141 mg/dL (18 Feb 2018 11:46)  POCT Blood Glucose.: 114 mg/dL (18 Feb 2018 08:34)  POCT Blood Glucose.: 119 mg/dL (17 Feb 2018 22:09)        MEDICATIONS  (STANDING):  aspirin  chewable 81 milliGRAM(s) Oral daily  atorvastatin 20 milliGRAM(s) Oral at bedtime  baclofen 10 milliGRAM(s) Oral three times a day  carvedilol 3.125 milliGRAM(s) Oral every 12 hours  chlorhexidine 4% Liquid 1 Application(s) Topical daily  citalopram 5 milliGRAM(s) Oral at bedtime  cloNIDine Patch 0.1 mG/24Hr(s) 1 patch Topical <User Schedule>  dextrose 5%. 1000 milliLiter(s) (75 mL/Hr) IV Continuous <Continuous>  donepezil 10 milliGRAM(s) Oral at bedtime  heparin  Injectable 5000 Unit(s) SubCutaneous every 12 hours  insulin lispro (HumaLOG) corrective regimen sliding scale   SubCutaneous Before meals and at bedtime  levoFLOXacin IVPB 250 milliGRAM(s) IV Intermittent every 24 hours  levoFLOXacin IVPB      levothyroxine 25 MICROGram(s) Oral daily  losartan 50 milliGRAM(s) Oral daily  nystatin Powder 1 Application(s) Topical daily  pantoprazole  Injectable 40 milliGRAM(s) IV Push daily  raloxifene 60 milliGRAM(s) Oral daily  sodium chloride 3%  Inhalation 4 milliLiter(s) Inhalation every 6 hours    MEDICATIONS  (PRN):  acetaminophen    Suspension 650 milliGRAM(s) Oral every 6 hours PRN For Temp greater than 38 C (100.4 F)  glycopyrrolate Injectable 0.2 milliGRAM(s) IV Push every 6 hours PRN Secretions  labetalol Injectable 10 milliGRAM(s) IV Push every 8 hours PRN Systolic/Diastolic >  morphine  - Injectable 0.5 milliGRAM(s) IV Push every 3 hours PRN Dyspnea          RADIOLOGY & ADDITIONAL TESTS:    2/16/18: Xray Chest 1 View- PORTABLE-Routine (02.16.18 @ 07:10) : Poor inspiration crowds the chest. The heart is magnified by technique. Endotracheal tube and nasogastric tube seen on February 15 have been removed. There are mild basilar effusions possibly with some adjacent atelectasis but this has shown significant improvement particularly on the left compared to February 15.        2/15/18: Xray Chest 1 View- PORTABLE-Routine (02.15.18 @ 11:04) : ET and enteric tubes remain in place. No pneumothorax. Bibasilar airspace opacities, left worst than right, similar to the prior  study. Possible trace left pleural effusion. The cardiac silhouette is within normal limits in size.          MICROBIOLOGY DATA:      Culture - Urine (02.15.18 @ 13:37)    -  Amikacin: S <=8    -  Ampicillin: R >16    -  Ampicillin/Sulbactam: R >16/8    -  Aztreonam: R >16    -  Cefazolin: R >16    -  Cefepime: R >16    -  Cefoxitin: R >16    -  Ceftazidime: R >16    -  Ceftriaxone: R >32    -  Ciprofloxacin: S <=0.5    -  Ertapenem: S <=0.5    -  Gentamicin: S 2    -  Imipenem: S <=1    -  Levofloxacin: S <=1    -  Meropenem: S <=1    -  Nitrofurantoin: S <=32    -  Piperacillin/Tazobactam: R <=8    -  Tobramycin: S <=2    -  Trimethoprim/Sulfamethoxazole: R >2/38    Specimen Source: .Urine Clean Catch (Midstream)    Culture Results:   >100,000 CFU/ml Escherichia coli ESBL    Organism Identification: Escherichia coli ESBL    Organism: Escherichia coli ESBL    Method Type: AJ Patient is seen and examined at the bed side, is afebrile. She has no complaints, mcfadden catheter in placed.  The creatinine is trending down.  The repeat Urine analysis is negative.        REVIEW OF SYSTEMS: All other review systems are negative          Vital Signs Last 24 Hrs  T(C): 37.2 (20 Feb 2018 15:40), Max: 37.2 (20 Feb 2018 15:40)  T(F): 98.9 (20 Feb 2018 15:40), Max: 98.9 (20 Feb 2018 15:40)  HR: 82 (20 Feb 2018 18:53) (82 - 110)  BP: 158/62 (20 Feb 2018 18:53) (136/95 - 176/73)  BP(mean): --  RR: 16 (20 Feb 2018 18:53) (16 - 18)  SpO2: 94% (20 Feb 2018 18:53) (94% - 97%)          PHYSICAL EXAM:  GENERAL: Not in distress  CVS: s1 and s2 present   RESP: Air entry B/L  GI: Abdomen soft and nontender  : Mcfadden catheter in placed   EXT: No pedal edema  CNS:  Awake and alert        ALLERGIES: NKDA          LABS:                        9.2    8.7   )-----------( 341      ( 20 Feb 2018 08:53 )             29.6                           9.5    8.0   )-----------( 300      ( 19 Feb 2018 05:54 )             30.2                 02-20    150<H>  |  113<H>  |  23<H>  ----------------------------<  193<H>  3.3<L>   |  30  |  1.23    Ca    7.9<L>      20 Feb 2018 08:53  Phos  2.0     02-19  Mg     1.8     02-19      02-19    150<H>  |  116<H>  |  31<H>  ----------------------------<  248<H>  3.7   |  28  |  1.55<H>    Ca    7.7<L>      19 Feb 2018 05:54  Phos  2.0     02-19  Mg     1.8     02-19      CAPILLARY BLOOD GLUCOSE      POCT Blood Glucose.: 158 mg/dL (18 Feb 2018 17:17)  POCT Blood Glucose.: 141 mg/dL (18 Feb 2018 11:46)  POCT Blood Glucose.: 114 mg/dL (18 Feb 2018 08:34)  POCT Blood Glucose.: 119 mg/dL (17 Feb 2018 22:09)        MEDICATIONS  (STANDING):  aspirin  chewable 81 milliGRAM(s) Oral daily  atorvastatin 20 milliGRAM(s) Oral at bedtime  baclofen 10 milliGRAM(s) Oral three times a day  carvedilol 3.125 milliGRAM(s) Oral every 12 hours  chlorhexidine 4% Liquid 1 Application(s) Topical daily  citalopram 5 milliGRAM(s) Oral at bedtime  cloNIDine Patch 0.1 mG/24Hr(s) 1 patch Topical <User Schedule>  dextrose 5%. 1000 milliLiter(s) (75 mL/Hr) IV Continuous <Continuous>  donepezil 10 milliGRAM(s) Oral at bedtime  heparin  Injectable 5000 Unit(s) SubCutaneous every 12 hours  insulin lispro (HumaLOG) corrective regimen sliding scale   SubCutaneous Before meals and at bedtime  levoFLOXacin IVPB 250 milliGRAM(s) IV Intermittent every 24 hours  levoFLOXacin IVPB      levothyroxine 25 MICROGram(s) Oral daily  losartan 50 milliGRAM(s) Oral daily  nystatin Powder 1 Application(s) Topical daily  pantoprazole  Injectable 40 milliGRAM(s) IV Push daily  raloxifene 60 milliGRAM(s) Oral daily  sodium chloride 3%  Inhalation 4 milliLiter(s) Inhalation every 6 hours    MEDICATIONS  (PRN):  acetaminophen    Suspension 650 milliGRAM(s) Oral every 6 hours PRN For Temp greater than 38 C (100.4 F)  glycopyrrolate Injectable 0.2 milliGRAM(s) IV Push every 6 hours PRN Secretions  labetalol Injectable 10 milliGRAM(s) IV Push every 8 hours PRN Systolic/Diastolic >  morphine  - Injectable 0.5 milliGRAM(s) IV Push every 3 hours PRN Dyspnea          RADIOLOGY & ADDITIONAL TESTS:    2/16/18: Xray Chest 1 View- PORTABLE-Routine (02.16.18 @ 07:10) : Poor inspiration crowds the chest. The heart is magnified by technique. Endotracheal tube and nasogastric tube seen on February 15 have been removed. There are mild basilar effusions possibly with some adjacent atelectasis but this has shown significant improvement particularly on the left compared to February 15.        2/15/18: Xray Chest 1 View- PORTABLE-Routine (02.15.18 @ 11:04) : ET and enteric tubes remain in place. No pneumothorax. Bibasilar airspace opacities, left worst than right, similar to the prior  study. Possible trace left pleural effusion. The cardiac silhouette is within normal limits in size.          MICROBIOLOGY DATA:      Culture - Urine (02.15.18 @ 13:37)    -  Amikacin: S <=8    -  Ampicillin: R >16    -  Ampicillin/Sulbactam: R >16/8    -  Aztreonam: R >16    -  Cefazolin: R >16    -  Cefepime: R >16    -  Cefoxitin: R >16    -  Ceftazidime: R >16    -  Ceftriaxone: R >32    -  Ciprofloxacin: S <=0.5    -  Ertapenem: S <=0.5    -  Gentamicin: S 2    -  Imipenem: S <=1    -  Levofloxacin: S <=1    -  Meropenem: S <=1    -  Nitrofurantoin: S <=32    -  Piperacillin/Tazobactam: R <=8    -  Tobramycin: S <=2    -  Trimethoprim/Sulfamethoxazole: R >2/38    Specimen Source: .Urine Clean Catch (Midstream)    Culture Results:   >100,000 CFU/ml Escherichia coli ESBL    Organism Identification: Escherichia coli ESBL    Organism: Escherichia coli ESBL    Method Type: AJ

## 2018-02-20 NOTE — PROGRESS NOTE ADULT - ATTENDING COMMENTS
[facility] NEPHROLOGY  Rogelio Freire M.D.  Villa Blakely D.O.  Erika Fuentes M.D.  Nadine Diaz, MSN, ANP-C  [phone]    [address] [place] [address]

## 2018-02-20 NOTE — PROGRESS NOTE ADULT - SUBJECTIVE AND OBJECTIVE BOX
[facility] NEPHROLOGY- PROGRESS NOTE    Patient is a 76 yo F with MS, HTN, HLD , MS, CVA with left hemiparesis, bed bound at baseline admitted to MICU with acute respiratory failure s/p trach 2/2 Influenza B/ Acute bronchitis, PABLO, Elevated troponins 2/2 demand Ischemia and ESBL UTI. Renal consulted for elevated SCr.     Hospital Medications: Medications reviewed.  REVIEW OF SYSTEMS: Limited ROS- no complaints.     VITALS:  T(F): 98.9 (18 @ 15:40), Max: 98.9 (18 @ 15:40)  HR: 83 (18 @ 15:40)  BP: 176/73 (18 @ 15:40)  RR: 16 (18 @ 15:40)  SpO2: 94% (18 @ 15:40)  Wt(kg): --  Height (cm): 160.02 ( 08:58)  Weight (kg): 63.5 (:58)  BMI (kg/m2): 24.8 ( 08:58)  BSA (m2): 1.66 ( 08:58)     @ 07:01  -   @ 07:00  --------------------------------------------------------  IN: 0 mL / OUT: 1500 mL / NET: -1500 mL      PHYSICAL EXAM:  Gen: NAD, calm  Cards: RRR, +S1/S2, no M/G/R  Resp: CTA B/L  GI: soft, NT/ND, NABS  Extremities: no LE edema B/L  Left hemiparesis      LABS:      150<H>  |  113<H>  |  23<H>  ----------------------------<  193<H>  3.3<L>   |  30  |  1.23    Ca    7.9<L>      2018 08:53  Phos  2.0       Mg     1.8           Creatinine Trend: 1.23 <--, 1.55 <--, 1.42 <--, 1.61 <--, 1.98 <--, 1.93 <--, 1.74 <--                        9.2    8.7   )-----------( 341      ( 2018 08:53 )             29.6     Urine Studies:  Urinalysis Basic - ( 2018 09:11 )    Color: Yellow / Appearance: Clear / S.010 / pH:   Gluc:  / Ketone: Negative  / Bili: Negative / Urobili: Negative   Blood:  / Protein: 30 mg/dL / Nitrite: Negative   Leuk Esterase: Trace / RBC: 5-10 /HPF / WBC 3-5 /HPF   Sq Epi:  / Non Sq Epi: Few /HPF / Bacteria: Moderate /HPF        RADIOLOGY & ADDITIONAL STUDIES:

## 2018-02-20 NOTE — CONSULT NOTE ADULT - CONSULT REQUESTED DATE/TIME
12-Feb-2018 16:00
18-Feb-2018 19:52
19-Feb-2018 11:01
19-Feb-2018 15:50
20-Feb-2018 11:59
12-Feb-2018 16:45
18-Feb-2018 20:18

## 2018-02-21 LAB
ANION GAP SERPL CALC-SCNC: 7 MMOL/L — SIGNIFICANT CHANGE UP (ref 5–17)
BASOPHILS # BLD AUTO: 0 K/UL — SIGNIFICANT CHANGE UP (ref 0–0.2)
BASOPHILS NFR BLD AUTO: 0.4 % — SIGNIFICANT CHANGE UP (ref 0–2)
BUN SERPL-MCNC: 19 MG/DL — HIGH (ref 7–18)
CALCIUM SERPL-MCNC: 8.4 MG/DL — SIGNIFICANT CHANGE UP (ref 8.4–10.5)
CHLORIDE SERPL-SCNC: 110 MMOL/L — HIGH (ref 96–108)
CO2 SERPL-SCNC: 30 MMOL/L — SIGNIFICANT CHANGE UP (ref 22–31)
CREAT SERPL-MCNC: 1.22 MG/DL — SIGNIFICANT CHANGE UP (ref 0.5–1.3)
EOSINOPHIL # BLD AUTO: 0.3 K/UL — SIGNIFICANT CHANGE UP (ref 0–0.5)
EOSINOPHIL NFR BLD AUTO: 2.9 % — SIGNIFICANT CHANGE UP (ref 0–6)
GLUCOSE BLDC GLUCOMTR-MCNC: 177 MG/DL — HIGH (ref 70–99)
GLUCOSE BLDC GLUCOMTR-MCNC: 184 MG/DL — HIGH (ref 70–99)
GLUCOSE BLDC GLUCOMTR-MCNC: 212 MG/DL — HIGH (ref 70–99)
GLUCOSE BLDC GLUCOMTR-MCNC: 247 MG/DL — HIGH (ref 70–99)
GLUCOSE SERPL-MCNC: 196 MG/DL — HIGH (ref 70–99)
HCT VFR BLD CALC: 31.4 % — LOW (ref 34.5–45)
HGB BLD-MCNC: 9.8 G/DL — LOW (ref 11.5–15.5)
LYMPHOCYTES # BLD AUTO: 1.6 K/UL — SIGNIFICANT CHANGE UP (ref 1–3.3)
LYMPHOCYTES # BLD AUTO: 15.4 % — SIGNIFICANT CHANGE UP (ref 13–44)
MCHC RBC-ENTMCNC: 27.9 PG — SIGNIFICANT CHANGE UP (ref 27–34)
MCHC RBC-ENTMCNC: 31.2 GM/DL — LOW (ref 32–36)
MCV RBC AUTO: 89.7 FL — SIGNIFICANT CHANGE UP (ref 80–100)
MONOCYTES # BLD AUTO: 0.6 K/UL — SIGNIFICANT CHANGE UP (ref 0–0.9)
MONOCYTES NFR BLD AUTO: 5.2 % — SIGNIFICANT CHANGE UP (ref 2–14)
NEUTROPHILS # BLD AUTO: 8.2 K/UL — HIGH (ref 1.8–7.4)
NEUTROPHILS NFR BLD AUTO: 76.1 % — SIGNIFICANT CHANGE UP (ref 43–77)
PLATELET # BLD AUTO: 358 K/UL — SIGNIFICANT CHANGE UP (ref 150–400)
POTASSIUM SERPL-MCNC: 4.2 MMOL/L — SIGNIFICANT CHANGE UP (ref 3.5–5.3)
POTASSIUM SERPL-SCNC: 4.2 MMOL/L — SIGNIFICANT CHANGE UP (ref 3.5–5.3)
RBC # BLD: 3.5 M/UL — LOW (ref 3.8–5.2)
RBC # FLD: 13.8 % — SIGNIFICANT CHANGE UP (ref 10.3–14.5)
SODIUM SERPL-SCNC: 147 MMOL/L — HIGH (ref 135–145)
WBC # BLD: 10.7 K/UL — HIGH (ref 3.8–10.5)
WBC # FLD AUTO: 10.7 K/UL — HIGH (ref 3.8–10.5)

## 2018-02-21 RX ADMIN — Medication 2: at 09:04

## 2018-02-21 RX ADMIN — ATORVASTATIN CALCIUM 20 MILLIGRAM(S): 80 TABLET, FILM COATED ORAL at 22:37

## 2018-02-21 RX ADMIN — NYSTATIN CREAM 1 APPLICATION(S): 100000 CREAM TOPICAL at 12:23

## 2018-02-21 RX ADMIN — SODIUM CHLORIDE 75 MILLILITER(S): 9 INJECTION, SOLUTION INTRAVENOUS at 05:32

## 2018-02-21 RX ADMIN — PANTOPRAZOLE SODIUM 40 MILLIGRAM(S): 20 TABLET, DELAYED RELEASE ORAL at 12:25

## 2018-02-21 RX ADMIN — Medication 81 MILLIGRAM(S): at 12:24

## 2018-02-21 RX ADMIN — Medication 10 MILLIGRAM(S): at 05:31

## 2018-02-21 RX ADMIN — DONEPEZIL HYDROCHLORIDE 10 MILLIGRAM(S): 10 TABLET, FILM COATED ORAL at 22:37

## 2018-02-21 RX ADMIN — Medication 10 MILLIGRAM(S): at 22:37

## 2018-02-21 RX ADMIN — Medication 2: at 12:24

## 2018-02-21 RX ADMIN — RALOXIFENE HYDROCHLORIDE 60 MILLIGRAM(S): 60 TABLET, COATED ORAL at 12:27

## 2018-02-21 RX ADMIN — SODIUM CHLORIDE 75 MILLILITER(S): 9 INJECTION, SOLUTION INTRAVENOUS at 22:38

## 2018-02-21 RX ADMIN — Medication 1: at 22:37

## 2018-02-21 RX ADMIN — CITALOPRAM 5 MILLIGRAM(S): 10 TABLET, FILM COATED ORAL at 22:37

## 2018-02-21 RX ADMIN — SODIUM CHLORIDE 75 MILLILITER(S): 9 INJECTION, SOLUTION INTRAVENOUS at 19:40

## 2018-02-21 RX ADMIN — CARVEDILOL PHOSPHATE 3.12 MILLIGRAM(S): 80 CAPSULE, EXTENDED RELEASE ORAL at 18:11

## 2018-02-21 RX ADMIN — Medication 10 MILLIGRAM(S): at 09:41

## 2018-02-21 RX ADMIN — LOSARTAN POTASSIUM 50 MILLIGRAM(S): 100 TABLET, FILM COATED ORAL at 05:31

## 2018-02-21 RX ADMIN — Medication 1 PATCH: at 09:39

## 2018-02-21 RX ADMIN — CHLORHEXIDINE GLUCONATE 1 APPLICATION(S): 213 SOLUTION TOPICAL at 09:52

## 2018-02-21 RX ADMIN — Medication 25 MICROGRAM(S): at 05:31

## 2018-02-21 RX ADMIN — Medication 10 MILLIGRAM(S): at 16:30

## 2018-02-21 RX ADMIN — SODIUM CHLORIDE 4 MILLILITER(S): 9 INJECTION INTRAMUSCULAR; INTRAVENOUS; SUBCUTANEOUS at 09:51

## 2018-02-21 RX ADMIN — CARVEDILOL PHOSPHATE 3.12 MILLIGRAM(S): 80 CAPSULE, EXTENDED RELEASE ORAL at 05:31

## 2018-02-21 RX ADMIN — HEPARIN SODIUM 5000 UNIT(S): 5000 INJECTION INTRAVENOUS; SUBCUTANEOUS at 05:31

## 2018-02-21 NOTE — PROGRESS NOTE ADULT - PROBLEM SELECTOR PLAN 2
in the setting of poor PO intake. Water deficit 2L.   Serum sodium improving; c/w D5@ 75 cc/hr. Encourage PO fluid intake.   Monitor serum Na.

## 2018-02-21 NOTE — PROGRESS NOTE ADULT - ATTENDING COMMENTS
[facility] NEPHROLOGY  Rogelio Freire M.D.  Villa Blakely D.O.  Erika Fuentes M.D.  Nadine Diaz, MSN, ANP-C  [phone]    [address] [place] [address]

## 2018-02-21 NOTE — PROGRESS NOTE ADULT - PROBLEM SELECTOR PROBLEM 10
Palliative care encounter
Prophylactic measure

## 2018-02-21 NOTE — PROGRESS NOTE ADULT - ASSESSMENT
A 77 F from home, lives with friend PMH of MS, HTN, HLD , MS, CVA with left hemiparesis , was brought in to the ER on 2/12/18,by friend for respiratory distress.  EMS arrived found her to be in respiratory distress with low sPO2 and accessory muscle use , endotracheal intubation was attempted twice with 40 mg of etomidate but failed , oral airway was inserted and ventilation via BMV were continued. She found to have positive Influenza B and Pneumonia. She has completed the course of Influenza B and on Ceftriaxone for Pneumonia. On admission urine culture grew ESBL  E.coli in the setting of negative Urine analysis. Hence, Ceftriaxone change to Levaquin base don C & S, and The ID consult requested to assist with further antibiotic management.    # Influenza B - s/p Tamiflu  # s/p Respiratory failure- s/p extubation  # Pneumonia- resolving  # ESBL  E.coli in Urine culture- Most likely a contaminant In the setting of Negative Urine analysis    Would recommend:  1. Reevaluate the need of mcfadden catheter  2. Continue Levaquin until 2/22/18 to complete the course of Pneumonia  3. Aspiration precaution    d/w Patient and Nursing  staff    will follow the patient with you

## 2018-02-21 NOTE — PROGRESS NOTE ADULT - SUBJECTIVE AND OBJECTIVE BOX
[facility] NEPHROLOGY- PROGRESS NOTE    Patient is a 78 yo F with MS, HTN, HLD , MS, CVA with left hemiparesis, bed bound at baseline admitted to MICU with acute respiratory failure s/p trach 2/2 Influenza B/ Acute bronchitis, PABLO, Elevated troponins 2/2 demand Ischemia and ESBL UTI. Renal consulted for elevated SCr.     Hospital Medications: Medications reviewed.  REVIEW OF SYSTEMS: Limited ROS- no complaints.     VITALS:  T(F): 97.3 (18 @ 08:00), Max: 98.9 (18 @ 15:40)  HR: 75 (18 @ 10:00)  BP: 185/55 (18 @ 10:00)  RR: 16 (18 @ 08:00)  SpO2: 95% (18 @ 08:00)  Wt(kg): --     @ 07:01  -   @ 07:00  --------------------------------------------------------  IN: 0 mL / OUT: 1600 mL / NET: -1600 mL      PHYSICAL EXAM:  Gen: NAD, calm  Cards: RRR, +S1/S2, no M/G/R  Resp: CTA B/L  GI: soft, NT/ND, NABS  Extremities: no LE edema B/L  Left hemiparesis    LABS:    147 <--, 150 <--, 150 <--, 146 <--, 146 <--, 143 <--, 141 <--  147<H>  |  110<H>  |  19<H>  ----------------------------<  196<H>  4.2   |  30  |  1.22    Ca    8.4      2018 07:23      Creatinine Trend: 1.22 <--, 1.23 <--, 1.55 <--, 1.42 <--, 1.61 <--, 1.98 <--, 1.93 <--                        9.8    10.7  )-----------( 358      ( 2018 07:23 )             31.4     Urine Studies:  Urinalysis Basic - ( 2018 09:11 )    Color: Yellow / Appearance: Clear / S.010 / pH:   Gluc:  / Ketone: Negative  / Bili: Negative / Urobili: Negative   Blood:  / Protein: 30 mg/dL / Nitrite: Negative   Leuk Esterase: Trace / RBC: 5-10 /HPF / WBC 3-5 /HPF   Sq Epi:  / Non Sq Epi: Few /HPF / Bacteria: Moderate /HPF                RADIOLOGY & ADDITIONAL STUDIES:

## 2018-02-21 NOTE — PROGRESS NOTE ADULT - SUBJECTIVE AND OBJECTIVE BOX
Patient seen and examined.   Time of visit:    MEDICATIONS  (STANDING):  aspirin  chewable 81 milliGRAM(s) Oral daily  atorvastatin 20 milliGRAM(s) Oral at bedtime  baclofen 10 milliGRAM(s) Oral three times a day  carvedilol 3.125 milliGRAM(s) Oral every 12 hours  chlorhexidine 4% Liquid 1 Application(s) Topical daily  citalopram 5 milliGRAM(s) Oral at bedtime  cloNIDine Patch 0.1 mG/24Hr(s) 1 patch Topical <User Schedule>  dextrose 5%. 1000 milliLiter(s) (75 mL/Hr) IV Continuous <Continuous>  donepezil 10 milliGRAM(s) Oral at bedtime  heparin  Injectable 5000 Unit(s) SubCutaneous every 12 hours  insulin lispro (HumaLOG) corrective regimen sliding scale   SubCutaneous Before meals and at bedtime  levoFLOXacin IVPB 250 milliGRAM(s) IV Intermittent every 24 hours  levoFLOXacin IVPB      levothyroxine 25 MICROGram(s) Oral daily  losartan 50 milliGRAM(s) Oral daily  nystatin Powder 1 Application(s) Topical daily  pantoprazole  Injectable 40 milliGRAM(s) IV Push daily  raloxifene 60 milliGRAM(s) Oral daily      MEDICATIONS  (PRN):  acetaminophen    Suspension 650 milliGRAM(s) Oral every 6 hours PRN For Temp greater than 38 C (100.4 F)  glycopyrrolate Injectable 0.2 milliGRAM(s) IV Push every 6 hours PRN Secretions  labetalol Injectable 10 milliGRAM(s) IV Push every 8 hours PRN Systolic/Diastolic >  morphine  - Injectable 0.5 milliGRAM(s) IV Push every 3 hours PRN Dyspnea   Medications up to date at time of exam.      PHYSICAL EXAMINATION:  Patient has no new complaints.  GENERAL: The patient is a well-developed, well-nourished, in no apparent distress.     Vital Signs Last 24 Hrs  T(C): 36.3 (2018 08:00), Max: 37.2 (2018 15:40)  T(F): 97.3 (2018 08:00), Max: 98.9 (2018 15:40)  HR: 75 (2018 10:00) (75 - 91)  BP: 185/55 (2018 10:00) (158/62 - 198/71)  BP(mean): --  RR: 16 (2018 08:00) (16 - 18)  SpO2: 95% (2018 08:00) (94% - 95%)   (if applicable)      HEENT: Head is normocephalic and atraumatic. Extraocular muscles are intact. Mucous membranes are moist.     NECK: Supple, no palpable adenopathy.    LUNGS: Clear to auscultation, no wheezing, rales, or rhonchi.    HEART: Regular rate and rhythm without murmur.    ABDOMEN: Soft, nontender, and nondistended.  No hepatosplenomegaly is noted.    EXTREMITIES: Without any cyanosis, clubbing, rash, lesions or edema.    NEUROLOGIC: Awake, alert    SKIN: Warm, dry, good turgor.      LABS:                        9.8    10.7  )-----------( 358      ( 2018 07:23 )             31.4     02-21    147<H>  |  110<H>  |  19<H>  ----------------------------<  196<H>  4.2   |  30  |  1.22    Ca    8.4      2018 07:23        Urinalysis Basic - ( 2018 09:11 )    Color: Yellow / Appearance: Clear / S.010 / pH: x  Gluc: x / Ketone: Negative  / Bili: Negative / Urobili: Negative   Blood: x / Protein: 30 mg/dL / Nitrite: Negative   Leuk Esterase: Trace / RBC: 5-10 /HPF / WBC 3-5 /HPF   Sq Epi: x / Non Sq Epi: Few /HPF / Bacteria: Moderate /HPF        MICROBIOLOGY: (if applicable)    RADIOLOGY & ADDITIONAL STUDIES:  EKG:   CXR:  ECHO:    IMPRESSION: 77y Female PAST MEDICAL & SURGICAL HISTORY:  MS (multiple sclerosis)  CVA (cerebral vascular accident)  Diabetes mellitus  Hypertension  Hypothyroidism   p/w     RECOMMENDATIONS:    Patient presents with SOB, respiratory failure due to flu, increased troponins due to demand ischemia.   Patient with intermittent tachycardia, in setting of infection, improving  2D echo shows grossly normal EF.  Can titrate coreg if BP continues to increase.

## 2018-02-21 NOTE — PROGRESS NOTE ADULT - PROBLEM SELECTOR PLAN 1
PABLO hemodynamically mediated in the setting of infection/ hypotension. PABLO resolving. Unknown baseline SCr.  Defer to primary team to obtain baseline renal function from PMD.  Strict I/Os. Avoid nephrotoxins/ NSAIDs/ RCA. Monitor BMP.

## 2018-02-21 NOTE — PROGRESS NOTE ADULT - SUBJECTIVE AND OBJECTIVE BOX
==================PGY 1 Note===================   Discussed with supervising resident and primary attending    ================CHIEF COMPLAINT===============  Patient is a 77y old  Female who presents with a chief complaint of Respiratory failure (2018 10:41)        =========INTERVAL HPI/OVERNIGHT EVENTS=========  Offers no new complaints    ============CURRENT MEDICATIONS===============    MEDICATIONS  (STANDING):  aspirin  chewable 81 milliGRAM(s) Oral daily  atorvastatin 20 milliGRAM(s) Oral at bedtime  baclofen 10 milliGRAM(s) Oral three times a day  carvedilol 3.125 milliGRAM(s) Oral every 12 hours  chlorhexidine 4% Liquid 1 Application(s) Topical daily  citalopram 5 milliGRAM(s) Oral at bedtime  cloNIDine Patch 0.1 mG/24Hr(s) 1 patch Topical <User Schedule>  dextrose 5%. 1000 milliLiter(s) (75 mL/Hr) IV Continuous <Continuous>  donepezil 10 milliGRAM(s) Oral at bedtime  heparin  Injectable 5000 Unit(s) SubCutaneous every 12 hours  insulin lispro (HumaLOG) corrective regimen sliding scale   SubCutaneous Before meals and at bedtime  levoFLOXacin IVPB 250 milliGRAM(s) IV Intermittent every 24 hours  levoFLOXacin IVPB      levothyroxine 25 MICROGram(s) Oral daily  losartan 50 milliGRAM(s) Oral daily  nystatin Powder 1 Application(s) Topical daily  pantoprazole  Injectable 40 milliGRAM(s) IV Push daily  raloxifene 60 milliGRAM(s) Oral daily    MEDICATIONS  (PRN):  acetaminophen    Suspension 650 milliGRAM(s) Oral every 6 hours PRN For Temp greater than 38 C (100.4 F)  glycopyrrolate Injectable 0.2 milliGRAM(s) IV Push every 6 hours PRN Secretions  labetalol Injectable 10 milliGRAM(s) IV Push every 8 hours PRN Systolic/Diastolic >  morphine  - Injectable 0.5 milliGRAM(s) IV Push every 3 hours PRN Dyspnea        ============REVIEW OF SYSTEMS==================    CONSTITUTIONAL: No fever  EYES: no acute visual disturbances  NECK: No pain or stiffness  RESPIRATORY: No cough; No shortness of breath  CARDIOVASCULAR: No chest pain, no palpitations  GASTROINTESTINAL: No pain. No nausea or vomiting; No diarrhea   NEUROLOGICAL: No headache or numbness, no tremors  MUSCULOSKELETAL: No joint pain, no muscle pain  GENITOURINARY: no dysuria, no frequency, no hesitancy  PSYCHIATRY: no depression , no anxiety  ALL OTHER  ROS negative      ================VITALS SIGNS=====================  Vital Signs Last 24 Hrs  T(C): 36.3 (2018 08:00), Max: 37.2 (2018 15:40)  T(F): 97.3 (2018 08:00), Max: 98.9 (2018 15:40)  HR: 75 (2018 10:00) (75 - 91)  BP: 185/55 (2018 10:00) (158/62 - 198/71)  BP(mean): --  RR: 16 (2018 08:00) (16 - 18)  SpO2: 95% (2018 08:00) (94% - 95%)    ===============PHYSICAL EXAM====================    GENERAL: NAD  HEENT: Normocephalic;  conjunctivae and sclerae clear; moist mucous membranes;   NECK : supple  CHEST/LUNG: Clear to auscultation bilaterally with good air entry   HEART: S1 S2  regular; no murmurs, gallops or rubs  ABDOMEN: Soft, Nontender, Nondistended; Bowel sounds present  EXTREMITIES: no cyanosis  NERVOUS SYSTEM:  Awake and alert; Oriented  to person    ==============LABORATORIES======================  LABS:                        9.8    10.7  )-----------( 358      ( 2018 07:23 )             31.4     02-    147<H>  |  110<H>  |  19<H>  ----------------------------<  196<H>  4.2   |  30  |  1.22    Ca    8.4      2018 07:23        Urinalysis Basic - ( 2018 09:11 )    Color: Yellow / Appearance: Clear / S.010 / pH: x  Gluc: x / Ketone: Negative  / Bili: Negative / Urobili: Negative   Blood: x / Protein: 30 mg/dL / Nitrite: Negative   Leuk Esterase: Trace / RBC: 5-10 /HPF / WBC 3-5 /HPF   Sq Epi: x / Non Sq Epi: Few /HPF / Bacteria: Moderate /HPF      CAPILLARY BLOOD GLUCOSE      POCT Blood Glucose.: 247 mg/dL (2018 11:45)  POCT Blood Glucose.: 212 mg/dL (2018 08:41)  POCT Blood Glucose.: 175 mg/dL (2018 21:53)  POCT Blood Glucose.: 210 mg/dL (2018 16:53)      =============INPUTS/OUPUTS=====================    18 @ 07:01  -  18 @ 07:00  --------------------------------------------------------  IN: 0 mL / OUT: 1600 mL / NET: -1600 mL          RADIOLOGY & ADDITIONAL TESTS:    Imaging Personally Reviewed:  YES    Consultant(s) Notes Reviewed:   YES    Care Discussed with Consultants : YES    Plan of care was discussed with patient  and /or primary care giver; all questions and concerns were addressed and care was aligned with patient's wishes. Time was allowed for questions that were answered to the best of my abilities

## 2018-02-21 NOTE — PROGRESS NOTE ADULT - SUBJECTIVE AND OBJECTIVE BOX
Patient is seen and examined at the bed side, is afebrile. She has no complaints, mcfadden catheter in placed.  The creatinine is trending down.  The repeat Urine analysis is negative.        REVIEW OF SYSTEMS: All other review systems are negative          Vital Signs Last 24 Hrs  T(C): 36.9 (21 Feb 2018 15:37), Max: 36.9 (21 Feb 2018 15:37)  T(F): 98.5 (21 Feb 2018 15:37), Max: 98.5 (21 Feb 2018 15:37)  HR: 70 (21 Feb 2018 15:37) (70 - 91)  BP: 171/58 (21 Feb 2018 15:37) (164/74 - 198/71)  BP(mean): --  RR: 16 (21 Feb 2018 15:37) (16 - 18)  SpO2: 92% (21 Feb 2018 15:37) (92% - 95%)        PHYSICAL EXAM:  GENERAL: Not in distress  CVS: s1 and s2 present   RESP: Air entry B/L  GI: Abdomen soft and nontender  : Mcfadden catheter in placed   EXT: No pedal edema  CNS:  Awake and alert        ALLERGIES: NKDA          LABS:                        9.8    10.7  )-----------( 358      ( 21 Feb 2018 07:23 )             31.4                           9.2    8.7   )-----------( 341      ( 20 Feb 2018 08:53 )             29.6                 02-21    147<H>  |  110<H>  |  19<H>  ----------------------------<  196<H>  4.2   |  30  |  1.22    Ca    8.4      21 Feb 2018 07:23      02-20    150<H>  |  113<H>  |  23<H>  ----------------------------<  193<H>  3.3<L>   |  30  |  1.23    Ca    7.9<L>      20 Feb 2018 08:53  Phos  2.0     02-19  Mg     1.8     02-19      CAPILLARY BLOOD GLUCOSE      POCT Blood Glucose.: 158 mg/dL (18 Feb 2018 17:17)  POCT Blood Glucose.: 141 mg/dL (18 Feb 2018 11:46)  POCT Blood Glucose.: 114 mg/dL (18 Feb 2018 08:34)  POCT Blood Glucose.: 119 mg/dL (17 Feb 2018 22:09)            MEDICATIONS  (STANDING):  aspirin  chewable 81 milliGRAM(s) Oral daily  atorvastatin 20 milliGRAM(s) Oral at bedtime  baclofen 10 milliGRAM(s) Oral three times a day  carvedilol 3.125 milliGRAM(s) Oral every 12 hours  chlorhexidine 4% Liquid 1 Application(s) Topical daily  citalopram 5 milliGRAM(s) Oral at bedtime  cloNIDine Patch 0.1 mG/24Hr(s) 1 patch Topical <User Schedule>  dextrose 5%. 1000 milliLiter(s) (75 mL/Hr) IV Continuous <Continuous>  donepezil 10 milliGRAM(s) Oral at bedtime  heparin  Injectable 5000 Unit(s) SubCutaneous every 12 hours  insulin lispro (HumaLOG) corrective regimen sliding scale   SubCutaneous Before meals and at bedtime  levoFLOXacin IVPB 250 milliGRAM(s) IV Intermittent every 24 hours  levoFLOXacin IVPB      levothyroxine 25 MICROGram(s) Oral daily  losartan 50 milliGRAM(s) Oral daily  nystatin Powder 1 Application(s) Topical daily  pantoprazole  Injectable 40 milliGRAM(s) IV Push daily  raloxifene 60 milliGRAM(s) Oral daily    MEDICATIONS  (PRN):  acetaminophen    Suspension 650 milliGRAM(s) Oral every 6 hours PRN For Temp greater than 38 C (100.4 F)  glycopyrrolate Injectable 0.2 milliGRAM(s) IV Push every 6 hours PRN Secretions  labetalol Injectable 10 milliGRAM(s) IV Push every 8 hours PRN Systolic/Diastolic >  morphine  - Injectable 0.5 milliGRAM(s) IV Push every 3 hours PRN Dyspnea          RADIOLOGY & ADDITIONAL TESTS:    2/16/18: Xray Chest 1 View- PORTABLE-Routine (02.16.18 @ 07:10) : Poor inspiration crowds the chest. The heart is magnified by technique. Endotracheal tube and nasogastric tube seen on February 15 have been removed. There are mild basilar effusions possibly with some adjacent atelectasis but this has shown significant improvement particularly on the left compared to February 15.        2/15/18: Xray Chest 1 View- PORTABLE-Routine (02.15.18 @ 11:04) : ET and enteric tubes remain in place. No pneumothorax. Bibasilar airspace opacities, left worst than right, similar to the prior  study. Possible trace left pleural effusion. The cardiac silhouette is within normal limits in size.          MICROBIOLOGY DATA:      Culture - Urine (02.15.18 @ 13:37)    -  Amikacin: S <=8    -  Ampicillin: R >16    -  Ampicillin/Sulbactam: R >16/8    -  Aztreonam: R >16    -  Cefazolin: R >16    -  Cefepime: R >16    -  Cefoxitin: R >16    -  Ceftazidime: R >16    -  Ceftriaxone: R >32    -  Ciprofloxacin: S <=0.5    -  Ertapenem: S <=0.5    -  Gentamicin: S 2    -  Imipenem: S <=1    -  Levofloxacin: S <=1    -  Meropenem: S <=1    -  Nitrofurantoin: S <=32    -  Piperacillin/Tazobactam: R <=8    -  Tobramycin: S <=2    -  Trimethoprim/Sulfamethoxazole: R >2/38    Specimen Source: .Urine Clean Catch (Midstream)    Culture Results:   >100,000 CFU/ml Escherichia coli ESBL    Organism Identification: Escherichia coli ESBL    Organism: Escherichia coli ESBL    Method Type: AJ Patient is seen and examined at the bed side, is afebrile. She is doing better.        REVIEW OF SYSTEMS: All other review systems are negative          Vital Signs Last 24 Hrs  T(C): 36.9 (21 Feb 2018 15:37), Max: 36.9 (21 Feb 2018 15:37)  T(F): 98.5 (21 Feb 2018 15:37), Max: 98.5 (21 Feb 2018 15:37)  HR: 70 (21 Feb 2018 15:37) (70 - 91)  BP: 171/58 (21 Feb 2018 15:37) (164/74 - 198/71)  BP(mean): --  RR: 16 (21 Feb 2018 15:37) (16 - 18)  SpO2: 92% (21 Feb 2018 15:37) (92% - 95%)        PHYSICAL EXAM:  GENERAL: Not in distress  CVS: s1 and s2 present   RESP: Air entry B/L  GI: Abdomen soft and nontender  : Macedo catheter in placed   EXT: No pedal edema  CNS:  Awake and alert        ALLERGIES: NKDA          LABS:                        9.8    10.7  )-----------( 358      ( 21 Feb 2018 07:23 )             31.4                           9.2    8.7   )-----------( 341      ( 20 Feb 2018 08:53 )             29.6                 02-21    147<H>  |  110<H>  |  19<H>  ----------------------------<  196<H>  4.2   |  30  |  1.22    Ca    8.4      21 Feb 2018 07:23      02-20    150<H>  |  113<H>  |  23<H>  ----------------------------<  193<H>  3.3<L>   |  30  |  1.23    Ca    7.9<L>      20 Feb 2018 08:53  Phos  2.0     02-19  Mg     1.8     02-19      CAPILLARY BLOOD GLUCOSE      POCT Blood Glucose.: 158 mg/dL (18 Feb 2018 17:17)  POCT Blood Glucose.: 141 mg/dL (18 Feb 2018 11:46)  POCT Blood Glucose.: 114 mg/dL (18 Feb 2018 08:34)  POCT Blood Glucose.: 119 mg/dL (17 Feb 2018 22:09)            MEDICATIONS  (STANDING):  aspirin  chewable 81 milliGRAM(s) Oral daily  atorvastatin 20 milliGRAM(s) Oral at bedtime  baclofen 10 milliGRAM(s) Oral three times a day  carvedilol 3.125 milliGRAM(s) Oral every 12 hours  chlorhexidine 4% Liquid 1 Application(s) Topical daily  citalopram 5 milliGRAM(s) Oral at bedtime  cloNIDine Patch 0.1 mG/24Hr(s) 1 patch Topical <User Schedule>  dextrose 5%. 1000 milliLiter(s) (75 mL/Hr) IV Continuous <Continuous>  donepezil 10 milliGRAM(s) Oral at bedtime  heparin  Injectable 5000 Unit(s) SubCutaneous every 12 hours  insulin lispro (HumaLOG) corrective regimen sliding scale   SubCutaneous Before meals and at bedtime  levoFLOXacin IVPB 250 milliGRAM(s) IV Intermittent every 24 hours  levoFLOXacin IVPB      levothyroxine 25 MICROGram(s) Oral daily  losartan 50 milliGRAM(s) Oral daily  nystatin Powder 1 Application(s) Topical daily  pantoprazole  Injectable 40 milliGRAM(s) IV Push daily  raloxifene 60 milliGRAM(s) Oral daily    MEDICATIONS  (PRN):  acetaminophen    Suspension 650 milliGRAM(s) Oral every 6 hours PRN For Temp greater than 38 C (100.4 F)  glycopyrrolate Injectable 0.2 milliGRAM(s) IV Push every 6 hours PRN Secretions  labetalol Injectable 10 milliGRAM(s) IV Push every 8 hours PRN Systolic/Diastolic >  morphine  - Injectable 0.5 milliGRAM(s) IV Push every 3 hours PRN Dyspnea          RADIOLOGY & ADDITIONAL TESTS:    2/16/18: Xray Chest 1 View- PORTABLE-Routine (02.16.18 @ 07:10) : Poor inspiration crowds the chest. The heart is magnified by technique. Endotracheal tube and nasogastric tube seen on February 15 have been removed. There are mild basilar effusions possibly with some adjacent atelectasis but this has shown significant improvement particularly on the left compared to February 15.        2/15/18: Xray Chest 1 View- PORTABLE-Routine (02.15.18 @ 11:04) : ET and enteric tubes remain in place. No pneumothorax. Bibasilar airspace opacities, left worst than right, similar to the prior  study. Possible trace left pleural effusion. The cardiac silhouette is within normal limits in size.          MICROBIOLOGY DATA:      Culture - Urine (02.15.18 @ 13:37)    -  Amikacin: S <=8    -  Ampicillin: R >16    -  Ampicillin/Sulbactam: R >16/8    -  Aztreonam: R >16    -  Cefazolin: R >16    -  Cefepime: R >16    -  Cefoxitin: R >16    -  Ceftazidime: R >16    -  Ceftriaxone: R >32    -  Ciprofloxacin: S <=0.5    -  Ertapenem: S <=0.5    -  Gentamicin: S 2    -  Imipenem: S <=1    -  Levofloxacin: S <=1    -  Meropenem: S <=1    -  Nitrofurantoin: S <=32    -  Piperacillin/Tazobactam: R <=8    -  Tobramycin: S <=2    -  Trimethoprim/Sulfamethoxazole: R >2/38    Specimen Source: .Urine Clean Catch (Midstream)    Culture Results:   >100,000 CFU/ml Escherichia coli ESBL    Organism Identification: Escherichia coli ESBL    Organism: Escherichia coli ESBL    Method Type: AJ

## 2018-02-21 NOTE — PROGRESS NOTE ADULT - SUBJECTIVE AND OBJECTIVE BOX
Patient seen and examined.     MEDICATIONS  (STANDING):  aspirin  chewable 81 milliGRAM(s) Oral daily  atorvastatin 20 milliGRAM(s) Oral at bedtime  baclofen 10 milliGRAM(s) Oral three times a day  carvedilol 3.125 milliGRAM(s) Oral every 12 hours  chlorhexidine 4% Liquid 1 Application(s) Topical daily  citalopram 5 milliGRAM(s) Oral at bedtime  cloNIDine Patch 0.1 mG/24Hr(s) 1 patch Topical <User Schedule>  dextrose 5%. 1000 milliLiter(s) (75 mL/Hr) IV Continuous <Continuous>  donepezil 10 milliGRAM(s) Oral at bedtime  heparin  Injectable 5000 Unit(s) SubCutaneous every 12 hours  insulin lispro (HumaLOG) corrective regimen sliding scale   SubCutaneous Before meals and at bedtime  levoFLOXacin IVPB 250 milliGRAM(s) IV Intermittent every 24 hours  levoFLOXacin IVPB      levothyroxine 25 MICROGram(s) Oral daily  losartan 50 milliGRAM(s) Oral daily  nystatin Powder 1 Application(s) Topical daily  pantoprazole  Injectable 40 milliGRAM(s) IV Push daily  raloxifene 60 milliGRAM(s) Oral daily      MEDICATIONS  (PRN):  acetaminophen    Suspension 650 milliGRAM(s) Oral every 6 hours PRN For Temp greater than 38 C (100.4 F)  glycopyrrolate Injectable 0.2 milliGRAM(s) IV Push every 6 hours PRN Secretions  labetalol Injectable 10 milliGRAM(s) IV Push every 8 hours PRN Systolic/Diastolic >  morphine  - Injectable 0.5 milliGRAM(s) IV Push every 3 hours PRN Dyspnea     Medications up to date at time of exam.    PHYSICAL EXAMINATION:  Patient has no new complaints.  GENERAL: The patient is a well-developed, well-nourished, in no apparent distress.     Vital Signs Last 24 Hrs  T(C): 36.3 (2018 08:00), Max: 37.2 (2018 15:40)  T(F): 97.3 (2018 08:00), Max: 98.9 (2018 15:40)  HR: 75 (2018 10:00) (75 - 91)  BP: 185/55 (2018 10:00) (158/62 - 198/71)  BP(mean): --  RR: 16 (2018 08:00) (16 - 18)  SpO2: 95% (2018 08:00) (94% - 95%)   (if applicable)    Chest Tube (if applicable)    HEENT: Head is normocephalic and atraumatic.     NECK: Supple, no palpable adenopathy.    LUNGS: Clear to auscultation, no wheezing, rales, or rhonchi.    HEART: Regular rate and rhythm without murmur.    ABDOMEN: Soft, nontender, and nondistended.      EXTREMITIES: Without any cyanosis, clubbing, rash, lesions or edema.    NEUROLOGIC: Awake, alert.    SKIN: Warm, dry, good turgor.    LABS:                        9.8    10.7  )-----------( 358      ( 2018 07:23 )             31.4     02-21    147<H>  |  110<H>  |  19<H>  ----------------------------<  196<H>  4.2   |  30  |  1.22    Ca    8.4      2018 07:23        Urinalysis Basic - ( 2018 09:11 )    Color: Yellow / Appearance: Clear / S.010 / pH: x  Gluc: x / Ketone: Negative  / Bili: Negative / Urobili: Negative   Blood: x / Protein: 30 mg/dL / Nitrite: Negative   Leuk Esterase: Trace / RBC: 5-10 /HPF / WBC 3-5 /HPF   Sq Epi: x / Non Sq Epi: Few /HPF / Bacteria: Moderate /HPF      MICROBIOLOGY: (if applicable)    RADIOLOGY & ADDITIONAL STUDIES:  EKG:   CXR:  ECHO:    IMPRESSION: 77y Female PAST MEDICAL & SURGICAL HISTORY:  MS (multiple sclerosis)  CVA (cerebral vascular accident)  Diabetes mellitus  Hypertension  Hypothyroidism       Impression; 78 Y/O Female with Acute Respiratory failure due to Influenza requiring intubation. Now extubated from ICU and on Medicine Unit. Has + RVP and completed Tamiflu.     Suggestion;    Oxygen supplementation to keep O2 saturation >90%, O 2saturation 96%.   Continue antibiotic for ESBL  Aspiration precautions.   DVT/ GI prophylaxis.

## 2018-02-22 ENCOUNTER — TRANSCRIPTION ENCOUNTER (OUTPATIENT)
Age: 78
End: 2018-02-22

## 2018-02-22 VITALS
RESPIRATION RATE: 16 BRPM | HEART RATE: 74 BPM | TEMPERATURE: 97 F | SYSTOLIC BLOOD PRESSURE: 147 MMHG | DIASTOLIC BLOOD PRESSURE: 57 MMHG | OXYGEN SATURATION: 93 %

## 2018-02-22 LAB
GLUCOSE BLDC GLUCOMTR-MCNC: 245 MG/DL — HIGH (ref 70–99)
GLUCOSE BLDC GLUCOMTR-MCNC: 271 MG/DL — HIGH (ref 70–99)

## 2018-02-22 PROCEDURE — 83880 ASSAY OF NATRIURETIC PEPTIDE: CPT

## 2018-02-22 PROCEDURE — 96376 TX/PRO/DX INJ SAME DRUG ADON: CPT

## 2018-02-22 PROCEDURE — 87798 DETECT AGENT NOS DNA AMP: CPT

## 2018-02-22 PROCEDURE — 82553 CREATINE MB FRACTION: CPT

## 2018-02-22 PROCEDURE — 82550 ASSAY OF CK (CPK): CPT

## 2018-02-22 PROCEDURE — 71045 X-RAY EXAM CHEST 1 VIEW: CPT

## 2018-02-22 PROCEDURE — 36415 COLL VENOUS BLD VENIPUNCTURE: CPT

## 2018-02-22 PROCEDURE — 87186 SC STD MICRODIL/AGAR DIL: CPT

## 2018-02-22 PROCEDURE — 87070 CULTURE OTHR SPECIMN AEROBIC: CPT

## 2018-02-22 PROCEDURE — 82803 BLOOD GASES ANY COMBINATION: CPT

## 2018-02-22 PROCEDURE — 87040 BLOOD CULTURE FOR BACTERIA: CPT

## 2018-02-22 PROCEDURE — 84484 ASSAY OF TROPONIN QUANT: CPT

## 2018-02-22 PROCEDURE — 80053 COMPREHEN METABOLIC PANEL: CPT

## 2018-02-22 PROCEDURE — 81001 URINALYSIS AUTO W/SCOPE: CPT

## 2018-02-22 PROCEDURE — 87086 URINE CULTURE/COLONY COUNT: CPT

## 2018-02-22 PROCEDURE — 87486 CHLMYD PNEUM DNA AMP PROBE: CPT

## 2018-02-22 PROCEDURE — 87633 RESP VIRUS 12-25 TARGETS: CPT

## 2018-02-22 PROCEDURE — 85730 THROMBOPLASTIN TIME PARTIAL: CPT

## 2018-02-22 PROCEDURE — 31605 EMER TRACHEOSTOMY CTHYR MEM: CPT

## 2018-02-22 PROCEDURE — 85027 COMPLETE CBC AUTOMATED: CPT

## 2018-02-22 PROCEDURE — 94640 AIRWAY INHALATION TREATMENT: CPT

## 2018-02-22 PROCEDURE — 82962 GLUCOSE BLOOD TEST: CPT

## 2018-02-22 PROCEDURE — 94003 VENT MGMT INPAT SUBQ DAY: CPT

## 2018-02-22 PROCEDURE — 96374 THER/PROPH/DIAG INJ IV PUSH: CPT

## 2018-02-22 PROCEDURE — 83036 HEMOGLOBIN GLYCOSYLATED A1C: CPT

## 2018-02-22 PROCEDURE — 82306 VITAMIN D 25 HYDROXY: CPT

## 2018-02-22 PROCEDURE — 83605 ASSAY OF LACTIC ACID: CPT

## 2018-02-22 PROCEDURE — 93306 TTE W/DOPPLER COMPLETE: CPT

## 2018-02-22 PROCEDURE — 93005 ELECTROCARDIOGRAM TRACING: CPT

## 2018-02-22 PROCEDURE — 99291 CRITICAL CARE FIRST HOUR: CPT | Mod: 25

## 2018-02-22 PROCEDURE — 84100 ASSAY OF PHOSPHORUS: CPT

## 2018-02-22 PROCEDURE — 87581 M.PNEUMON DNA AMP PROBE: CPT

## 2018-02-22 PROCEDURE — 96375 TX/PRO/DX INJ NEW DRUG ADDON: CPT | Mod: 76

## 2018-02-22 PROCEDURE — 84145 PROCALCITONIN (PCT): CPT

## 2018-02-22 PROCEDURE — 94002 VENT MGMT INPAT INIT DAY: CPT

## 2018-02-22 PROCEDURE — 83735 ASSAY OF MAGNESIUM: CPT

## 2018-02-22 PROCEDURE — 92610 EVALUATE SWALLOWING FUNCTION: CPT

## 2018-02-22 PROCEDURE — 80048 BASIC METABOLIC PNL TOTAL CA: CPT

## 2018-02-22 PROCEDURE — 96372 THER/PROPH/DIAG INJ SC/IM: CPT

## 2018-02-22 PROCEDURE — 85610 PROTHROMBIN TIME: CPT

## 2018-02-22 PROCEDURE — 70450 CT HEAD/BRAIN W/O DYE: CPT

## 2018-02-22 PROCEDURE — 84443 ASSAY THYROID STIM HORMONE: CPT

## 2018-02-22 RX ORDER — ATORVASTATIN CALCIUM 80 MG/1
20 TABLET, FILM COATED ORAL
Qty: 0 | Refills: 0 | COMMUNITY

## 2018-02-22 RX ORDER — ASPIRIN/CALCIUM CARB/MAGNESIUM 324 MG
1 TABLET ORAL
Qty: 30 | Refills: 0 | OUTPATIENT
Start: 2018-02-22 | End: 2018-03-23

## 2018-02-22 RX ORDER — ATORVASTATIN CALCIUM 80 MG/1
1 TABLET, FILM COATED ORAL
Qty: 30 | Refills: 0 | OUTPATIENT
Start: 2018-02-22 | End: 2018-03-23

## 2018-02-22 RX ORDER — CARVEDILOL PHOSPHATE 80 MG/1
6.25 CAPSULE, EXTENDED RELEASE ORAL EVERY 12 HOURS
Qty: 0 | Refills: 0 | Status: DISCONTINUED | OUTPATIENT
Start: 2018-02-22 | End: 2018-02-22

## 2018-02-22 RX ORDER — CARVEDILOL PHOSPHATE 80 MG/1
1 CAPSULE, EXTENDED RELEASE ORAL
Qty: 60 | Refills: 0 | OUTPATIENT
Start: 2018-02-22 | End: 2018-03-23

## 2018-02-22 RX ADMIN — Medication 2: at 11:59

## 2018-02-22 RX ADMIN — SODIUM CHLORIDE 75 MILLILITER(S): 9 INJECTION, SOLUTION INTRAVENOUS at 06:09

## 2018-02-22 RX ADMIN — Medication 1 PATCH: at 09:25

## 2018-02-22 RX ADMIN — Medication 3: at 09:24

## 2018-02-22 RX ADMIN — Medication 10 MILLIGRAM(S): at 06:09

## 2018-02-22 RX ADMIN — Medication 25 MICROGRAM(S): at 06:20

## 2018-02-22 RX ADMIN — NYSTATIN CREAM 1 APPLICATION(S): 100000 CREAM TOPICAL at 12:00

## 2018-02-22 RX ADMIN — LOSARTAN POTASSIUM 50 MILLIGRAM(S): 100 TABLET, FILM COATED ORAL at 06:09

## 2018-02-22 RX ADMIN — CARVEDILOL PHOSPHATE 3.12 MILLIGRAM(S): 80 CAPSULE, EXTENDED RELEASE ORAL at 06:09

## 2018-02-22 RX ADMIN — RALOXIFENE HYDROCHLORIDE 60 MILLIGRAM(S): 60 TABLET, COATED ORAL at 11:59

## 2018-02-22 RX ADMIN — Medication 10 MILLIGRAM(S): at 12:15

## 2018-02-22 RX ADMIN — HEPARIN SODIUM 5000 UNIT(S): 5000 INJECTION INTRAVENOUS; SUBCUTANEOUS at 06:11

## 2018-02-22 RX ADMIN — CHLORHEXIDINE GLUCONATE 1 APPLICATION(S): 213 SOLUTION TOPICAL at 11:24

## 2018-02-22 RX ADMIN — Medication 81 MILLIGRAM(S): at 11:59

## 2018-02-22 RX ADMIN — PANTOPRAZOLE SODIUM 40 MILLIGRAM(S): 20 TABLET, DELAYED RELEASE ORAL at 12:00

## 2018-02-22 NOTE — DISCHARGE NOTE ADULT - PLAN OF CARE
Resolved. Secondary to infectious process. Creatinine back to normal C/w home baclofen, Citalopram and donepezil Continue with your synthroid medication. Get constant follow up with TSH levels with you primary care physician for medication adjustment if needed. Resolution Admitted initially to the ICU for Respiratory Failure secondary to influenza and Pneumonia. Completed a course of antibiotics in patient and Tamiflu with significant improvement. Discussion was done with the palliative team. Decision was made to follow up with hospice after discharge due to patient multiple comorbidities that include her History of CVA, bedbound, Multiple sclerosis and fragility. History of CVA: Continue with Aspirin and Statin continue with diabetes medication Continue with blood pressure medication. Maintain a healthy diet that consist of low sugar, low fat, low sodium diet.

## 2018-02-22 NOTE — PROGRESS NOTE ADULT - PROBLEM SELECTOR PROBLEM 1
Acute respiratory failure
PABLO (acute kidney injury)

## 2018-02-22 NOTE — PROGRESS NOTE ADULT - SUBJECTIVE AND OBJECTIVE BOX
[facility] NEPHROLOGY- PROGRESS NOTE, Follow up     Patient is a 77y Female with with MS, HTN, HLD , MS, CVA with left hemiparesis, bed bound at baseline admitted to MICU with acute respiratory failure s/p trach 2/2 Influenza B/ Acute bronchitis, PABLO, Elevated troponins 2/2 demand Ischemia and ESBL UTI. Renal consulted for elevated SCr.       Allergy:  Allergy Status Unknown    Hospital Medications:   MEDICATIONS  (STANDING):  aspirin  chewable 81 milliGRAM(s) Oral daily  atorvastatin 20 milliGRAM(s) Oral at bedtime  baclofen 10 milliGRAM(s) Oral three times a day  carvedilol 6.25 milliGRAM(s) Oral every 12 hours  chlorhexidine 4% Liquid 1 Application(s) Topical daily  citalopram 5 milliGRAM(s) Oral at bedtime  cloNIDine Patch 0.1 mG/24Hr(s) 1 patch Topical <User Schedule>  dextrose 5%. 1000 milliLiter(s) (75 mL/Hr) IV Continuous <Continuous>  donepezil 10 milliGRAM(s) Oral at bedtime  heparin  Injectable 5000 Unit(s) SubCutaneous every 12 hours  insulin lispro (HumaLOG) corrective regimen sliding scale   SubCutaneous Before meals and at bedtime  levoFLOXacin IVPB 250 milliGRAM(s) IV Intermittent every 24 hours  levoFLOXacin IVPB      levothyroxine 25 MICROGram(s) Oral daily  losartan 50 milliGRAM(s) Oral daily  nystatin Powder 1 Application(s) Topical daily  pantoprazole  Injectable 40 milliGRAM(s) IV Push daily  raloxifene 60 milliGRAM(s) Oral daily    REVIEW OF SYSTEMS: Limited 2/2 inconsistent vverbal responses.  Denies any pain and reports that she wants to go home.       VITALS:  T(F): 97.1 (18 @ 08:04), Max: 98.6 (18 @ 23:35)  HR: 74 (18 @ 08:04)  BP: 147/57 (18 @ 08:04)  RR: 16 (18 @ 08:04)  SpO2: 93% (18 @ 08:04)  Wt(kg): --     @ 07:01  -   @ 07:00  --------------------------------------------------------  IN: 0 mL / OUT: 1475 mL / NET: -1475 mL        PHYSICAL EXAM:  Gen: NAD, calm  Cards: RRR, +S1/S2, no M/G/R  Resp: CTA B/L  GI: soft, NT/ND, NABS   + mcfadden   Extremities: no LE edema B/L  Left hemiparesis      LABS:      147<H>  |  110<H>  |  19<H>  ----------------------------<  196<H>  4.2   |  30  |  1.22    Ca    8.4      2018 07:23      Creatinine Trend: 1.22 <--, 1.23 <--, 1.55 <--, 1.42 <--, 1.61 <--, 1.98 <--                        9.8    10.7  )-----------( 358      ( 2018 07:23 )             31.4     Urine Studies:  Urinalysis Basic - ( 2018 09:11 )    Color: Yellow / Appearance: Clear / S.010 / pH:   Gluc:  / Ketone: Negative  / Bili: Negative / Urobili: Negative   Blood:  / Protein: 30 mg/dL / Nitrite: Negative   Leuk Esterase: Trace / RBC: 5-10 /HPF / WBC 3-5 /HPF   Sq Epi:  / Non Sq Epi: Few /HPF / Bacteria: Moderate /HPF        RADIOLOGY & ADDITIONAL STUDIES: [facility] NEPHROLOGY- PROGRESS NOTE, Follow up     Patient is a 77y Female with with MS, HTN, HLD , MS, CVA with left hemiparesis, bed bound at baseline admitted to MICU with acute respiratory failure s/p trach 2/2 Influenza B/ Acute bronchitis, PABLO, Elevated troponins 2/2 demand Ischemia and ESBL UTI. Renal consulted for elevated SCr.       Allergy:  Allergy Status Unknown    Hospital Medications:   MEDICATIONS  (STANDING):  aspirin  chewable 81 milliGRAM(s) Oral daily  atorvastatin 20 milliGRAM(s) Oral at bedtime  baclofen 10 milliGRAM(s) Oral three times a day  carvedilol 6.25 milliGRAM(s) Oral every 12 hours  chlorhexidine 4% Liquid 1 Application(s) Topical daily  citalopram 5 milliGRAM(s) Oral at bedtime  cloNIDine Patch 0.1 mG/24Hr(s) 1 patch Topical <User Schedule>  dextrose 5%. 1000 milliLiter(s) (75 mL/Hr) IV Continuous <Continuous>  donepezil 10 milliGRAM(s) Oral at bedtime  heparin  Injectable 5000 Unit(s) SubCutaneous every 12 hours  insulin lispro (HumaLOG) corrective regimen sliding scale   SubCutaneous Before meals and at bedtime  levoFLOXacin IVPB 250 milliGRAM(s) IV Intermittent every 24 hours  levoFLOXacin IVPB      levothyroxine 25 MICROGram(s) Oral daily  losartan 50 milliGRAM(s) Oral daily  nystatin Powder 1 Application(s) Topical daily  pantoprazole  Injectable 40 milliGRAM(s) IV Push daily  raloxifene 60 milliGRAM(s) Oral daily    REVIEW OF SYSTEMS: Limited 2/2 inconsistent verbal responses.  Denies any pain and reports that she wants to go home.       VITALS:  T(F): 97.1 (18 @ 08:04), Max: 98.6 (18 @ 23:35)  HR: 74 (18 @ 08:04)  BP: 147/57 (18 @ 08:04)  RR: 16 (18 @ 08:04)  SpO2: 93% (18 @ 08:04)  Wt(kg): --     @ 07:01  -   @ 07:00  --------------------------------------------------------  IN: 0 mL / OUT: 1475 mL / NET: -1475 mL        PHYSICAL EXAM:  Gen: NAD, calm  Cards: RRR, +S1/S2, no M/G/R  Resp: CTA B/L  GI: soft, NT/ND, NABS   + mcfadden   Extremities: no LE edema B/L  Left hemiparesis      LABS:      147<H>  |  110<H>  |  19<H>  ----------------------------<  196<H>  4.2   |  30  |  1.22    Ca    8.4      2018 07:23      Creatinine Trend: 1.22 <--, 1.23 <--, 1.55 <--, 1.42 <--, 1.61 <--, 1.98 <--                        9.8    10.7  )-----------( 358      ( 2018 07:23 )             31.4     Urine Studies:  Urinalysis Basic - ( 2018 09:11 )    Color: Yellow / Appearance: Clear / S.010 / pH:   Gluc:  / Ketone: Negative  / Bili: Negative / Urobili: Negative   Blood:  / Protein: 30 mg/dL / Nitrite: Negative   Leuk Esterase: Trace / RBC: 5-10 /HPF / WBC 3-5 /HPF   Sq Epi:  / Non Sq Epi: Few /HPF / Bacteria: Moderate /HPF        RADIOLOGY & ADDITIONAL STUDIES:

## 2018-02-22 NOTE — DISCHARGE NOTE ADULT - PATIENT PORTAL LINK FT
You can access the [facility] Patient Portal, offered by [facility], by registering with the following website: [email or web address]

## 2018-02-22 NOTE — DISCHARGE NOTE ADULT - CARE PLAN
Principal Discharge DX:	Acute respiratory failure  Goal:	Resolution  Assessment and plan of treatment:	Admitted initially to the ICU for Respiratory Failure secondary to influenza and Pneumonia. Completed a course of antibiotics in patient and Tamiflu with significant improvement. Discussion was done with the palliative team. Decision was made to follow up with hospice after discharge due to patient multiple comorbidities that include her History of CVA, bedbound, Multiple sclerosis and fragility.  Secondary Diagnosis:	CVA (cerebral vascular accident)  Assessment and plan of treatment:	History of CVA: Continue with Aspirin and Statin  Secondary Diagnosis:	Diabetes mellitus  Assessment and plan of treatment:	continue with diabetes medication  Secondary Diagnosis:	Essential hypertension, benign  Assessment and plan of treatment:	Continue with blood pressure medication. Maintain a healthy diet that consist of low sugar, low fat, low sodium diet.  Secondary Diagnosis:	ARF (acute renal failure)  Assessment and plan of treatment:	Resolved. Secondary to infectious process. Creatinine back to normal  Secondary Diagnosis:	MS (multiple sclerosis)  Assessment and plan of treatment:	C/w home baclofen, Citalopram and donepezil  Secondary Diagnosis:	Hypothyroidism  Assessment and plan of treatment:	Continue with your synthroid medication. Get constant follow up with TSH levels with you primary care physician for medication adjustment if needed.

## 2018-02-22 NOTE — PROGRESS NOTE ADULT - ATTENDING COMMENTS
[facility] NEPHROLOGY  Rogelio Freire M.D.  Villa Blakely D.O.  Erika Fuentes M.D.  Nadine Diaz, MSN, ANP-C  [phone]    [address] [place] [address] Patient seen and examined. Agree with plan above.  Note edited as necessary.  [facility] NEPHROLOGY  Rogelio Freire M.D.  Villa Blakely D.O.  Erika Fuentes M.D.  Nadine iDaz, MSN, ANP-C  [phone]    [address] [place] [id number]

## 2018-02-22 NOTE — DISCHARGE NOTE ADULT - MEDICATION SUMMARY - MEDICATIONS TO TAKE
I will START or STAY ON the medications listed below when I get home from the hospital:    aspirin 81 mg oral tablet, chewable  -- 1 tab(s) by mouth once a day  -- Indication: For CAD    losartan 50 mg oral tablet  -- 1 tab(s) by mouth once a day  -- Indication: For Hypertension    cloNIDine 0.1 mg/24 hr transdermal film, extended release  -- 1 patch by transdermal patch once a day   -- Indication: For Hypertension    CeleXA  -- 5 milligram(s) by mouth once a day (at bedtime)  -- Indication: For Antipressant    glipiZIDE 5 mg oral tablet  -- 1 tab(s) by mouth once a day  -- Indication: For Diabetes mellitus    atorvastatin 40 mg oral tablet  -- 1 tab(s) by mouth once a day  -- Indication: For Hyperlipidemia    raloxifene 60 mg oral tablet  -- 1 tab(s) by mouth once a day  -- Indication: For Antineoplastic    Haldol  -- 0.5 milligram(s) by mouth once a day (at bedtime), As Needed  -- Indication: For Agitation    carvedilol 6.25 mg oral tablet  -- 1 tab(s) by mouth every 12 hours  -- Indication: For Hypertension    Aricept  -- 10 milligram(s) by mouth once a day (at bedtime)  -- Indication: For MS (multiple sclerosis)    Namenda XR  -- 14 milligram(s) by mouth once a day  -- Indication: For MS (multiple sclerosis)    baclofen 10 mg oral tablet  -- 1 tab(s) by mouth 3 times a day  -- Indication: For MS (multiple sclerosis)    levothyroxine 25 mcg (0.025 mg) oral tablet  -- 1 tab(s) by mouth once a day  -- Indication: For Hypothyroidism

## 2018-02-22 NOTE — DISCHARGE NOTE ADULT - HOSPITAL COURSE
Background  and course of admission:            Given patient's improved clinical status and current hemodynamic stability, decision was made to discharge.  Please refer to patient's complete medical chart with documents for a full hospital course, for this is only a brief summary. Background  and course of admission:    77 F from home, lives with friend PM of MS, HTN, HLD , MS, CVA with left hemiparesis , was brought in to the ER on 2/12/18 by friend for respiratory distress.  EMS arrived found her to be in respiratory distress with low sPO2 and accessory muscle use , endotracheal intubation was attempted twice with 40 mg of etomidate but failed , oral airway was inserted and ventilation via BMV were continued. She found to have positive Influenza B and Pneumonia and managed in the ICU.    Admitted initially to the ICU for Respiratory Failure secondary to influenza and Pneumonia. Completed a course of antibiotics inpatient and Tamiflu with significant improvement. Discussion was done with the palliative team. Decision was made to follow up with hospice after discharge due to patient multiple comorbidities that include her History of CVA, bedbound, Multiple sclerosis and fragility.    Given patient's improved clinical status and current hemodynamic stability, decision was made to discharge. Plan is for patient to go for home hospice.   Please refer to patient's complete medical chart with documents for a full hospital course, for this is only a brief summary.

## 2018-02-22 NOTE — PROGRESS NOTE ADULT - PROBLEM SELECTOR PLAN 1
PABLO hemodynamically mediated in the setting of infection/ hypotension. PABLO resolving. Unknown baseline SCr, likely with underlying CKD3 given age and pre diabetes.  Defer to primary team to obtain baseline renal function from PMD.  Strict I/Os. Avoid nephrotoxins/ NSAIDs/ RCA. Monitor BMP.

## 2018-02-22 NOTE — PROGRESS NOTE ADULT - SUBJECTIVE AND OBJECTIVE BOX
Patient seen and examined.   Time of visit:    MEDICATIONS  (STANDING):  aspirin  chewable 81 milliGRAM(s) Oral daily  atorvastatin 20 milliGRAM(s) Oral at bedtime  baclofen 10 milliGRAM(s) Oral three times a day  carvedilol 6.25 milliGRAM(s) Oral every 12 hours  chlorhexidine 4% Liquid 1 Application(s) Topical daily  citalopram 5 milliGRAM(s) Oral at bedtime  cloNIDine Patch 0.1 mG/24Hr(s) 1 patch Topical <User Schedule>  dextrose 5%. 1000 milliLiter(s) (75 mL/Hr) IV Continuous <Continuous>  donepezil 10 milliGRAM(s) Oral at bedtime  heparin  Injectable 5000 Unit(s) SubCutaneous every 12 hours  insulin lispro (HumaLOG) corrective regimen sliding scale   SubCutaneous Before meals and at bedtime  levoFLOXacin IVPB 250 milliGRAM(s) IV Intermittent every 24 hours  levoFLOXacin IVPB      levothyroxine 25 MICROGram(s) Oral daily  losartan 50 milliGRAM(s) Oral daily  nystatin Powder 1 Application(s) Topical daily  pantoprazole  Injectable 40 milliGRAM(s) IV Push daily  raloxifene 60 milliGRAM(s) Oral daily      MEDICATIONS  (PRN):  acetaminophen    Suspension 650 milliGRAM(s) Oral every 6 hours PRN For Temp greater than 38 C (100.4 F)  glycopyrrolate Injectable 0.2 milliGRAM(s) IV Push every 6 hours PRN Secretions  labetalol Injectable 10 milliGRAM(s) IV Push every 8 hours PRN Systolic/Diastolic >  morphine  - Injectable 0.5 milliGRAM(s) IV Push every 3 hours PRN Dyspnea   Medications up to date at time of exam.      PHYSICAL EXAMINATION:  Patient has no new complaints.  GENERAL: The patient is a well-developed, well-nourished, in no apparent distress.     Vital Signs Last 24 Hrs  T(C): 36.2 (22 Feb 2018 08:04), Max: 37 (21 Feb 2018 23:35)  T(F): 97.1 (22 Feb 2018 08:04), Max: 98.6 (21 Feb 2018 23:35)  HR: 74 (22 Feb 2018 08:04) (70 - 79)  BP: 147/57 (22 Feb 2018 08:04) (147/57 - 171/58)  BP(mean): --  RR: 16 (22 Feb 2018 08:04) (16 - 16)  SpO2: 93% (22 Feb 2018 08:04) (92% - 94%)   (if applicable)      HEENT: Head is normocephalic and atraumatic.     NECK: Supple, no palpable adenopathy.    LUNGS: Clear to auscultation, no wheezing, rales, or rhonchi.    HEART: Regular rate and rhythm without murmur.    ABDOMEN: Soft, nontender, and nondistended.  No hepatosplenomegaly is noted.    EXTREMITIES: Without any cyanosis, clubbing, rash, lesions or edema.    NEUROLOGIC: Awake, alert.    SKIN: Warm, dry, good turgor.      LABS:                        9.8    10.7  )-----------( 358      ( 21 Feb 2018 07:23 )             31.4     02-21    147<H>  |  110<H>  |  19<H>  ----------------------------<  196<H>  4.2   |  30  |  1.22    Ca    8.4      21 Feb 2018 07:23          MICROBIOLOGY: (if applicable)    RADIOLOGY & ADDITIONAL STUDIES:  EKG:   CXR:  ECHO:    IMPRESSION: 77y Female PAST MEDICAL & SURGICAL HISTORY:  MS (multiple sclerosis)  CVA (cerebral vascular accident)  Diabetes mellitus  Hypertension  Hypothyroidism       Patient presents with SOB, respiratory failure due to flu, increased troponins due to demand ischemia.   Patient had intermittent tachycardia in the setting of infection which has now improved  2D echo shows grossly normal EF.  Can titrate coreg if BP continues to increase.

## 2018-02-22 NOTE — DISCHARGE NOTE ADULT - SECONDARY DIAGNOSIS.
ARF (acute renal failure) MS (multiple sclerosis) Hypothyroidism CVA (cerebral vascular accident) Diabetes mellitus Essential hypertension, benign

## 2018-02-22 NOTE — PROGRESS NOTE ADULT - PROBLEM SELECTOR PROBLEM 2
Hypernatremia
Hypernatremia
UTI (urinary tract infection)
UTI due to extended-spectrum beta lactamase (ESBL) producing Escherichia coli
Hypernatremia

## 2018-02-22 NOTE — DISCHARGE NOTE ADULT - INSTRUCTIONS
Recommend the DASH Diet that is Carbohydrate consistent due to diabetes: This emphasizes vegetables, fruits, and fat-free or low-fat dairy products. Includes whole grains, fish, poultry, beans, seeds, nuts, and vegetable oils. Please limit sodium, sweets, sugary beverages, and red meats.

## 2018-02-22 NOTE — PROGRESS NOTE ADULT - PROVIDER SPECIALTY LIST ADULT
Cardiology
Critical Care
Infectious Disease
Infectious Disease
Internal Medicine
Nephrology
Nephrology
Pulmonology
Pulmonology
Cardiology
Cardiology
Infectious Disease
Nephrology

## 2018-07-20 ENCOUNTER — INPATIENT (INPATIENT)
Facility: HOSPITAL | Age: 78
LOS: 5 days | Discharge: HOME | DRG: 699 | End: 2018-07-26
Attending: INTERNAL MEDICINE | Admitting: INTERNAL MEDICINE
Payer: MEDICARE

## 2018-07-20 VITALS
SYSTOLIC BLOOD PRESSURE: 189 MMHG | WEIGHT: 179.9 LBS | DIASTOLIC BLOOD PRESSURE: 79 MMHG | RESPIRATION RATE: 15 BRPM | OXYGEN SATURATION: 97 % | HEIGHT: 65 IN | HEART RATE: 112 BPM | TEMPERATURE: 98 F

## 2018-07-20 LAB
ALBUMIN SERPL ELPH-MCNC: 3.3 G/DL — LOW (ref 3.5–5)
ALP SERPL-CCNC: 118 U/L — SIGNIFICANT CHANGE UP (ref 40–120)
ALT FLD-CCNC: 15 U/L DA — SIGNIFICANT CHANGE UP (ref 10–60)
ANION GAP SERPL CALC-SCNC: 9 MMOL/L — SIGNIFICANT CHANGE UP (ref 5–17)
APPEARANCE UR: CLEAR — SIGNIFICANT CHANGE UP
AST SERPL-CCNC: 14 U/L — SIGNIFICANT CHANGE UP (ref 10–40)
BASOPHILS # BLD AUTO: 0.2 K/UL — SIGNIFICANT CHANGE UP (ref 0–0.2)
BASOPHILS NFR BLD AUTO: 1.1 % — SIGNIFICANT CHANGE UP (ref 0–2)
BILIRUB SERPL-MCNC: 0.3 MG/DL — SIGNIFICANT CHANGE UP (ref 0.2–1.2)
BILIRUB UR-MCNC: NEGATIVE — SIGNIFICANT CHANGE UP
BUN SERPL-MCNC: 32 MG/DL — HIGH (ref 7–18)
CALCIUM SERPL-MCNC: 8.9 MG/DL — SIGNIFICANT CHANGE UP (ref 8.4–10.5)
CHLORIDE SERPL-SCNC: 103 MMOL/L — SIGNIFICANT CHANGE UP (ref 96–108)
CK SERPL-CCNC: 96 U/L — SIGNIFICANT CHANGE UP (ref 21–215)
CO2 SERPL-SCNC: 23 MMOL/L — SIGNIFICANT CHANGE UP (ref 22–31)
COLOR SPEC: YELLOW — SIGNIFICANT CHANGE UP
CREAT SERPL-MCNC: 1.56 MG/DL — HIGH (ref 0.5–1.3)
DIFF PNL FLD: ABNORMAL
EOSINOPHIL # BLD AUTO: 0.1 K/UL — SIGNIFICANT CHANGE UP (ref 0–0.5)
EOSINOPHIL NFR BLD AUTO: 0.4 % — SIGNIFICANT CHANGE UP (ref 0–6)
ERYTHROCYTE [SEDIMENTATION RATE] IN BLOOD: 30 MM/HR — HIGH (ref 0–20)
GLUCOSE SERPL-MCNC: 155 MG/DL — HIGH (ref 70–99)
GLUCOSE UR QL: NEGATIVE — SIGNIFICANT CHANGE UP
HCT VFR BLD CALC: 38.3 % — SIGNIFICANT CHANGE UP (ref 34.5–45)
HGB BLD-MCNC: 12.5 G/DL — SIGNIFICANT CHANGE UP (ref 11.5–15.5)
KETONES UR-MCNC: NEGATIVE — SIGNIFICANT CHANGE UP
LACTATE SERPL-SCNC: 1.3 MMOL/L — SIGNIFICANT CHANGE UP (ref 0.7–2)
LEUKOCYTE ESTERASE UR-ACNC: ABNORMAL
LIDOCAIN IGE QN: 147 U/L — SIGNIFICANT CHANGE UP (ref 73–393)
LYMPHOCYTES # BLD AUTO: 1.8 K/UL — SIGNIFICANT CHANGE UP (ref 1–3.3)
LYMPHOCYTES # BLD AUTO: 9.9 % — LOW (ref 13–44)
MCHC RBC-ENTMCNC: 27.9 PG — SIGNIFICANT CHANGE UP (ref 27–34)
MCHC RBC-ENTMCNC: 32.7 GM/DL — SIGNIFICANT CHANGE UP (ref 32–36)
MCV RBC AUTO: 85.5 FL — SIGNIFICANT CHANGE UP (ref 80–100)
MONOCYTES # BLD AUTO: 1.3 K/UL — HIGH (ref 0–0.9)
MONOCYTES NFR BLD AUTO: 7.4 % — SIGNIFICANT CHANGE UP (ref 2–14)
NEUTROPHILS # BLD AUTO: 14.4 K/UL — HIGH (ref 1.8–7.4)
NEUTROPHILS NFR BLD AUTO: 81.1 % — HIGH (ref 43–77)
NITRITE UR-MCNC: NEGATIVE — SIGNIFICANT CHANGE UP
PH UR: 5 — SIGNIFICANT CHANGE UP (ref 5–8)
PLATELET # BLD AUTO: 262 K/UL — SIGNIFICANT CHANGE UP (ref 150–400)
POTASSIUM SERPL-MCNC: 3.6 MMOL/L — SIGNIFICANT CHANGE UP (ref 3.5–5.3)
POTASSIUM SERPL-SCNC: 3.6 MMOL/L — SIGNIFICANT CHANGE UP (ref 3.5–5.3)
PROT SERPL-MCNC: 7.1 G/DL — SIGNIFICANT CHANGE UP (ref 6–8.3)
PROT UR-MCNC: 30 MG/DL
RBC # BLD: 4.48 M/UL — SIGNIFICANT CHANGE UP (ref 3.8–5.2)
RBC # FLD: 13.2 % — SIGNIFICANT CHANGE UP (ref 10.3–14.5)
SODIUM SERPL-SCNC: 135 MMOL/L — SIGNIFICANT CHANGE UP (ref 135–145)
SP GR SPEC: 1.01 — SIGNIFICANT CHANGE UP (ref 1.01–1.02)
T4 FREE+ TSH PNL SERPL: 2.79 UU/ML — SIGNIFICANT CHANGE UP (ref 0.34–4.82)
TROPONIN I SERPL-MCNC: <0.015 NG/ML — SIGNIFICANT CHANGE UP (ref 0–0.04)
UROBILINOGEN FLD QL: NEGATIVE — SIGNIFICANT CHANGE UP
WBC # BLD: 17.7 K/UL — HIGH (ref 3.8–10.5)
WBC # FLD AUTO: 17.7 K/UL — HIGH (ref 3.8–10.5)

## 2018-07-20 PROCEDURE — 71045 X-RAY EXAM CHEST 1 VIEW: CPT | Mod: 26

## 2018-07-20 PROCEDURE — 74176 CT ABD & PELVIS W/O CONTRAST: CPT | Mod: 26

## 2018-07-20 RX ORDER — SODIUM CHLORIDE 9 MG/ML
1000 INJECTION INTRAMUSCULAR; INTRAVENOUS; SUBCUTANEOUS ONCE
Qty: 0 | Refills: 0 | Status: COMPLETED | OUTPATIENT
Start: 2018-07-20 | End: 2018-07-20

## 2018-07-20 NOTE — ED PROVIDER NOTE - SKIN, MLM
Scattered erythematous, blanching, not raised, not target lesion, not vesicle, throughout the body sparing the palms and soles.

## 2018-07-20 NOTE — ED PROVIDER NOTE - OBJECTIVE STATEMENT
78 y/o F patient with PMHx of HTN, HLD, DM, hypothyroid, CVA, MS, bedbound at baseline, brought in by home health aid to ED c/o 2 months of watery stools that is not getting better and now for the past 2 days has developed a fever, generalized weakness, and rash. Patient was started on Levaquin around the same time the rash developed. Patient appears to be at baseline, as per home health aid. Otherwise, no shortness of breath, cough, vomiting, new soaps, new lotions, new detergents, new pets, or any other complaints. NKDA.

## 2018-07-20 NOTE — ED PROVIDER NOTE - CONSTITUTIONAL, MLM
normal... Elderly nontoxic appearing female, well nourished, able to say yes and no, and in no apparent distress.

## 2018-07-20 NOTE — ED PROVIDER NOTE - MEDICAL DECISION MAKING DETAILS
76 y/o F patient with rash, possible due to infection/UTI vs. medication induced, vs. underlying colitis. Will obtain labs, CT abd, give fluids, blood cultures, and reassess. Likely admit.

## 2018-07-20 NOTE — ED PROVIDER NOTE - PROGRESS NOTE DETAILS
[place]: ct shows constipation without sbo.  wbc 17 without source of infection.    zosyn and 1 L NS given.  admit to med for dermatitis nos possibly due to levaquin.  Encopresis.

## 2018-07-21 DIAGNOSIS — R19.7 DIARRHEA, UNSPECIFIED: ICD-10-CM

## 2018-07-21 DIAGNOSIS — L30.9 DERMATITIS, UNSPECIFIED: ICD-10-CM

## 2018-07-21 DIAGNOSIS — G35 MULTIPLE SCLEROSIS: ICD-10-CM

## 2018-07-21 DIAGNOSIS — I63.9 CEREBRAL INFARCTION, UNSPECIFIED: ICD-10-CM

## 2018-07-21 DIAGNOSIS — R21 RASH AND OTHER NONSPECIFIC SKIN ERUPTION: ICD-10-CM

## 2018-07-21 DIAGNOSIS — N39.0 URINARY TRACT INFECTION, SITE NOT SPECIFIED: ICD-10-CM

## 2018-07-21 DIAGNOSIS — Z29.9 ENCOUNTER FOR PROPHYLACTIC MEASURES, UNSPECIFIED: ICD-10-CM

## 2018-07-21 DIAGNOSIS — E11.9 TYPE 2 DIABETES MELLITUS WITHOUT COMPLICATIONS: ICD-10-CM

## 2018-07-21 DIAGNOSIS — I10 ESSENTIAL (PRIMARY) HYPERTENSION: ICD-10-CM

## 2018-07-21 PROBLEM — E03.9 HYPOTHYROIDISM, UNSPECIFIED: Chronic | Status: ACTIVE | Noted: 2018-02-12

## 2018-07-21 LAB
ANION GAP SERPL CALC-SCNC: 10 MMOL/L — SIGNIFICANT CHANGE UP (ref 5–17)
BASOPHILS # BLD AUTO: 0.1 K/UL — SIGNIFICANT CHANGE UP (ref 0–0.2)
BASOPHILS NFR BLD AUTO: 0.7 % — SIGNIFICANT CHANGE UP (ref 0–2)
BUN SERPL-MCNC: 29 MG/DL — HIGH (ref 7–18)
CALCIUM SERPL-MCNC: 8.7 MG/DL — SIGNIFICANT CHANGE UP (ref 8.4–10.5)
CHLORIDE SERPL-SCNC: 107 MMOL/L — SIGNIFICANT CHANGE UP (ref 96–108)
CHOLEST SERPL-MCNC: 203 MG/DL — HIGH (ref 10–199)
CO2 SERPL-SCNC: 21 MMOL/L — LOW (ref 22–31)
CREAT SERPL-MCNC: 1.76 MG/DL — HIGH (ref 0.5–1.3)
EOSINOPHIL # BLD AUTO: 0.1 K/UL — SIGNIFICANT CHANGE UP (ref 0–0.5)
EOSINOPHIL NFR BLD AUTO: 1.3 % — SIGNIFICANT CHANGE UP (ref 0–6)
FOLATE SERPL-MCNC: 9.5 NG/ML — SIGNIFICANT CHANGE UP
GLUCOSE BLDC GLUCOMTR-MCNC: 119 MG/DL — HIGH (ref 70–99)
GLUCOSE BLDC GLUCOMTR-MCNC: 159 MG/DL — HIGH (ref 70–99)
GLUCOSE BLDC GLUCOMTR-MCNC: 163 MG/DL — HIGH (ref 70–99)
GLUCOSE BLDC GLUCOMTR-MCNC: 196 MG/DL — HIGH (ref 70–99)
GLUCOSE BLDC GLUCOMTR-MCNC: 87 MG/DL — SIGNIFICANT CHANGE UP (ref 70–99)
GLUCOSE SERPL-MCNC: 256 MG/DL — HIGH (ref 70–99)
HBA1C BLD-MCNC: 7 % — HIGH (ref 4–5.6)
HCT VFR BLD CALC: 35.2 % — SIGNIFICANT CHANGE UP (ref 34.5–45)
HDLC SERPL-MCNC: 36 MG/DL — LOW (ref 40–125)
HGB BLD-MCNC: 11.2 G/DL — LOW (ref 11.5–15.5)
LIPID PNL WITH DIRECT LDL SERPL: 127 MG/DL — SIGNIFICANT CHANGE UP
LYMPHOCYTES # BLD AUTO: 1.2 K/UL — SIGNIFICANT CHANGE UP (ref 1–3.3)
LYMPHOCYTES # BLD AUTO: 11.6 % — LOW (ref 13–44)
MCHC RBC-ENTMCNC: 27.7 PG — SIGNIFICANT CHANGE UP (ref 27–34)
MCHC RBC-ENTMCNC: 31.9 GM/DL — LOW (ref 32–36)
MCV RBC AUTO: 86.8 FL — SIGNIFICANT CHANGE UP (ref 80–100)
MONOCYTES # BLD AUTO: 0.7 K/UL — SIGNIFICANT CHANGE UP (ref 0–0.9)
MONOCYTES NFR BLD AUTO: 7.2 % — SIGNIFICANT CHANGE UP (ref 2–14)
NEUTROPHILS # BLD AUTO: 7.9 K/UL — HIGH (ref 1.8–7.4)
NEUTROPHILS NFR BLD AUTO: 79.2 % — HIGH (ref 43–77)
PLATELET # BLD AUTO: 205 K/UL — SIGNIFICANT CHANGE UP (ref 150–400)
POTASSIUM SERPL-MCNC: 3.3 MMOL/L — LOW (ref 3.5–5.3)
POTASSIUM SERPL-SCNC: 3.3 MMOL/L — LOW (ref 3.5–5.3)
RBC # BLD: 4.06 M/UL — SIGNIFICANT CHANGE UP (ref 3.8–5.2)
RBC # FLD: 13.4 % — SIGNIFICANT CHANGE UP (ref 10.3–14.5)
SODIUM SERPL-SCNC: 138 MMOL/L — SIGNIFICANT CHANGE UP (ref 135–145)
TOTAL CHOLESTEROL/HDL RATIO MEASUREMENT: 5.6 RATIO — SIGNIFICANT CHANGE UP (ref 3.3–7.1)
TRIGL SERPL-MCNC: 202 MG/DL — HIGH (ref 10–149)
TSH SERPL-MCNC: 2.43 UU/ML — SIGNIFICANT CHANGE UP (ref 0.34–4.82)
VIT B12 SERPL-MCNC: 520 PG/ML — SIGNIFICANT CHANGE UP (ref 232–1245)
WBC # BLD: 10 K/UL — SIGNIFICANT CHANGE UP (ref 3.8–10.5)
WBC # FLD AUTO: 10 K/UL — SIGNIFICANT CHANGE UP (ref 3.8–10.5)

## 2018-07-21 PROCEDURE — 99285 EMERGENCY DEPT VISIT HI MDM: CPT

## 2018-07-21 RX ORDER — MEMANTINE HYDROCHLORIDE 10 MG/1
14 TABLET ORAL DAILY
Qty: 0 | Refills: 0 | Status: DISCONTINUED | OUTPATIENT
Start: 2018-07-21 | End: 2018-07-21

## 2018-07-21 RX ORDER — PIPERACILLIN AND TAZOBACTAM 4; .5 G/20ML; G/20ML
3.38 INJECTION, POWDER, LYOPHILIZED, FOR SOLUTION INTRAVENOUS ONCE
Qty: 0 | Refills: 0 | Status: COMPLETED | OUTPATIENT
Start: 2018-07-21 | End: 2018-07-21

## 2018-07-21 RX ORDER — CITALOPRAM 10 MG/1
5 TABLET, FILM COATED ORAL DAILY
Qty: 0 | Refills: 0 | Status: DISCONTINUED | OUTPATIENT
Start: 2018-07-21 | End: 2018-07-26

## 2018-07-21 RX ORDER — METRONIDAZOLE 500 MG
500 TABLET ORAL EVERY 8 HOURS
Qty: 0 | Refills: 0 | Status: DISCONTINUED | OUTPATIENT
Start: 2018-07-21 | End: 2018-07-24

## 2018-07-21 RX ORDER — HYDROCORTISONE 1 %
1 OINTMENT (GRAM) TOPICAL THREE TIMES A DAY
Qty: 0 | Refills: 0 | Status: DISCONTINUED | OUTPATIENT
Start: 2018-07-21 | End: 2018-07-26

## 2018-07-21 RX ORDER — ATORVASTATIN CALCIUM 80 MG/1
40 TABLET, FILM COATED ORAL AT BEDTIME
Qty: 0 | Refills: 0 | Status: DISCONTINUED | OUTPATIENT
Start: 2018-07-21 | End: 2018-07-26

## 2018-07-21 RX ORDER — DONEPEZIL HYDROCHLORIDE 10 MG/1
10 TABLET, FILM COATED ORAL AT BEDTIME
Qty: 0 | Refills: 0 | Status: DISCONTINUED | OUTPATIENT
Start: 2018-07-21 | End: 2018-07-26

## 2018-07-21 RX ORDER — HEPARIN SODIUM 5000 [USP'U]/ML
5000 INJECTION INTRAVENOUS; SUBCUTANEOUS EVERY 8 HOURS
Qty: 0 | Refills: 0 | Status: DISCONTINUED | OUTPATIENT
Start: 2018-07-21 | End: 2018-07-26

## 2018-07-21 RX ORDER — CARVEDILOL PHOSPHATE 80 MG/1
6.25 CAPSULE, EXTENDED RELEASE ORAL EVERY 12 HOURS
Qty: 0 | Refills: 0 | Status: DISCONTINUED | OUTPATIENT
Start: 2018-07-21 | End: 2018-07-26

## 2018-07-21 RX ORDER — ASPIRIN/CALCIUM CARB/MAGNESIUM 324 MG
81 TABLET ORAL DAILY
Qty: 0 | Refills: 0 | Status: DISCONTINUED | OUTPATIENT
Start: 2018-07-21 | End: 2018-07-26

## 2018-07-21 RX ORDER — BACLOFEN 100 %
10 POWDER (GRAM) MISCELLANEOUS THREE TIMES A DAY
Qty: 0 | Refills: 0 | Status: DISCONTINUED | OUTPATIENT
Start: 2018-07-21 | End: 2018-07-26

## 2018-07-21 RX ORDER — LOSARTAN POTASSIUM 100 MG/1
50 TABLET, FILM COATED ORAL DAILY
Qty: 0 | Refills: 0 | Status: DISCONTINUED | OUTPATIENT
Start: 2018-07-21 | End: 2018-07-26

## 2018-07-21 RX ORDER — MEMANTINE HYDROCHLORIDE 10 MG/1
10 TABLET ORAL DAILY
Qty: 0 | Refills: 0 | Status: DISCONTINUED | OUTPATIENT
Start: 2018-07-21 | End: 2018-07-26

## 2018-07-21 RX ORDER — LEVOTHYROXINE SODIUM 125 MCG
25 TABLET ORAL DAILY
Qty: 0 | Refills: 0 | Status: DISCONTINUED | OUTPATIENT
Start: 2018-07-21 | End: 2018-07-26

## 2018-07-21 RX ORDER — DEXTROSE 50 % IN WATER 50 %
25 SYRINGE (ML) INTRAVENOUS ONCE
Qty: 0 | Refills: 0 | Status: DISCONTINUED | OUTPATIENT
Start: 2018-07-21 | End: 2018-07-26

## 2018-07-21 RX ORDER — SODIUM CHLORIDE 9 MG/ML
1000 INJECTION, SOLUTION INTRAVENOUS
Qty: 0 | Refills: 0 | Status: DISCONTINUED | OUTPATIENT
Start: 2018-07-21 | End: 2018-07-26

## 2018-07-21 RX ORDER — LORATADINE 10 MG/1
10 TABLET ORAL DAILY
Qty: 0 | Refills: 0 | Status: DISCONTINUED | OUTPATIENT
Start: 2018-07-21 | End: 2018-07-26

## 2018-07-21 RX ORDER — DEXTROSE 50 % IN WATER 50 %
15 SYRINGE (ML) INTRAVENOUS ONCE
Qty: 0 | Refills: 0 | Status: DISCONTINUED | OUTPATIENT
Start: 2018-07-21 | End: 2018-07-26

## 2018-07-21 RX ORDER — DEXTROSE 50 % IN WATER 50 %
12.5 SYRINGE (ML) INTRAVENOUS ONCE
Qty: 0 | Refills: 0 | Status: DISCONTINUED | OUTPATIENT
Start: 2018-07-21 | End: 2018-07-26

## 2018-07-21 RX ORDER — CEFTRIAXONE 500 MG/1
1 INJECTION, POWDER, FOR SOLUTION INTRAMUSCULAR; INTRAVENOUS EVERY 24 HOURS
Qty: 0 | Refills: 0 | Status: DISCONTINUED | OUTPATIENT
Start: 2018-07-21 | End: 2018-07-24

## 2018-07-21 RX ORDER — RALOXIFENE HYDROCHLORIDE 60 MG/1
60 TABLET, COATED ORAL DAILY
Qty: 0 | Refills: 0 | Status: DISCONTINUED | OUTPATIENT
Start: 2018-07-21 | End: 2018-07-21

## 2018-07-21 RX ORDER — INSULIN LISPRO 100/ML
VIAL (ML) SUBCUTANEOUS
Qty: 0 | Refills: 0 | Status: DISCONTINUED | OUTPATIENT
Start: 2018-07-21 | End: 2018-07-26

## 2018-07-21 RX ORDER — GLUCAGON INJECTION, SOLUTION 0.5 MG/.1ML
1 INJECTION, SOLUTION SUBCUTANEOUS ONCE
Qty: 0 | Refills: 0 | Status: DISCONTINUED | OUTPATIENT
Start: 2018-07-21 | End: 2018-07-26

## 2018-07-21 RX ADMIN — Medication 1: at 09:08

## 2018-07-21 RX ADMIN — Medication 100 MILLIGRAM(S): at 21:57

## 2018-07-21 RX ADMIN — Medication 100 MILLIGRAM(S): at 14:22

## 2018-07-21 RX ADMIN — HEPARIN SODIUM 5000 UNIT(S): 5000 INJECTION INTRAVENOUS; SUBCUTANEOUS at 05:21

## 2018-07-21 RX ADMIN — Medication 1 APPLICATION(S): at 14:23

## 2018-07-21 RX ADMIN — Medication 1 APPLICATION(S): at 05:20

## 2018-07-21 RX ADMIN — CARVEDILOL PHOSPHATE 6.25 MILLIGRAM(S): 80 CAPSULE, EXTENDED RELEASE ORAL at 05:20

## 2018-07-21 RX ADMIN — Medication 25 MICROGRAM(S): at 05:20

## 2018-07-21 RX ADMIN — Medication 10 MILLIGRAM(S): at 14:22

## 2018-07-21 RX ADMIN — HEPARIN SODIUM 5000 UNIT(S): 5000 INJECTION INTRAVENOUS; SUBCUTANEOUS at 14:22

## 2018-07-21 RX ADMIN — Medication 10 MILLIGRAM(S): at 21:57

## 2018-07-21 RX ADMIN — Medication 1: at 12:23

## 2018-07-21 RX ADMIN — ATORVASTATIN CALCIUM 40 MILLIGRAM(S): 80 TABLET, FILM COATED ORAL at 21:57

## 2018-07-21 RX ADMIN — PIPERACILLIN AND TAZOBACTAM 200 GRAM(S): 4; .5 INJECTION, POWDER, LYOPHILIZED, FOR SOLUTION INTRAVENOUS at 01:38

## 2018-07-21 RX ADMIN — DONEPEZIL HYDROCHLORIDE 10 MILLIGRAM(S): 10 TABLET, FILM COATED ORAL at 21:57

## 2018-07-21 RX ADMIN — CEFTRIAXONE 100 GRAM(S): 500 INJECTION, POWDER, FOR SOLUTION INTRAMUSCULAR; INTRAVENOUS at 01:43

## 2018-07-21 RX ADMIN — MEMANTINE HYDROCHLORIDE 10 MILLIGRAM(S): 10 TABLET ORAL at 12:23

## 2018-07-21 RX ADMIN — Medication 10 MILLIGRAM(S): at 05:20

## 2018-07-21 RX ADMIN — Medication 81 MILLIGRAM(S): at 14:22

## 2018-07-21 RX ADMIN — SODIUM CHLORIDE 1000 MILLILITER(S): 9 INJECTION INTRAMUSCULAR; INTRAVENOUS; SUBCUTANEOUS at 00:09

## 2018-07-21 RX ADMIN — LOSARTAN POTASSIUM 50 MILLIGRAM(S): 100 TABLET, FILM COATED ORAL at 05:20

## 2018-07-21 RX ADMIN — CARVEDILOL PHOSPHATE 6.25 MILLIGRAM(S): 80 CAPSULE, EXTENDED RELEASE ORAL at 17:52

## 2018-07-21 RX ADMIN — LORATADINE 10 MILLIGRAM(S): 10 TABLET ORAL at 14:23

## 2018-07-21 RX ADMIN — Medication 1 APPLICATION(S): at 22:06

## 2018-07-21 RX ADMIN — HEPARIN SODIUM 5000 UNIT(S): 5000 INJECTION INTRAVENOUS; SUBCUTANEOUS at 21:56

## 2018-07-21 RX ADMIN — CITALOPRAM 5 MILLIGRAM(S): 10 TABLET, FILM COATED ORAL at 14:21

## 2018-07-21 NOTE — H&P ADULT - PROBLEM SELECTOR PLAN 5
IMPROVE VTE Individual Risk Assessment    RISK                                                          Points  [] Previous VTE                                           3  [] Thrombophilia                                        2  [] Lower limb paralysis                              2   [] Current Cancer                                       2   [x] Immobilization > 24 hrs                        1  [] ICU/CCU stay > 24 hours                       1  [x] Age > 60                                                   1    IMPROVE VTE Score: 2  need DVT ppx, on heparin drip  no need for GI ppx hx of CVA with left hemiparesis   C/w ASA, Statin on glipizide 5mg daily at home  started hiss  f/u A1C

## 2018-07-21 NOTE — CONSULT NOTE ADULT - ASSESSMENT
78 yo F from home has HHA , ROBBI X1 (oriented to person ), bed bound at baseline with chronic mcfadden with PMH of MS, HTN, HLD , DM, MS, CVA with left hemiparesis brought in by HHA to ED c/o 2 months of ''brown watery stools" that is not getting better and now for the past 2 days has developed a fever, generalized weakness, and rash. Patient visited PMD and was started on Levaquin for 2 day around the same time the rash developed. Patient appears to be at baseline, as per home health aid. Otherwise, no shortness of breath,, vomiting, new soaps, new lotions, new detergents, new pets, or abdominal pian .  [facility] 2/2 to Influenza and Pneumonia. says is coughing with inability to bring up phlegm .     ED course, temp 97.8, , /79, RR 15 and SO2 97% on RA. Labs show WBC 17.7k with left shift, UA positive for UTI, cxr neg for consolidation  and CT a/p shows Rectal distention with liquid stool in keeping with the history of diarrhea. No bowel obstruction. Pt had no diarrhea since in ED    # likely constipation with overiding diarrhea     # r/o uti . has an indwelling mcfadden     plan  f/u blood cx   f/u urine cx   cont rocephin pending urine cx   can d/c flagyl   laxatives as per pmd     thnx will f/u   d/w resident on call

## 2018-07-21 NOTE — H&P ADULT - HISTORY OF PRESENT ILLNESS
77 F from home, lives with friend PMH of MS, HTN, HLD , MS, CVA with left hemiparesis , bed bound at baseline brought in by home health aid to ED c/o 2 months of watery stools that is not getting better and now for the past 2 days has developed a fever, generalized weakness, and rash. Patient was started on Levaquin around the same time the rash developed. Patient appears to be at baseline, as per home health aid. Otherwise, no shortness of breath, cough, vomiting, new soaps, new lotions, new detergents, new pets, or any other complaints. NKDA. 78 yo F from home has HHA ( Imer Bates [phone]), AAO X1 (oriented to person ), bed bound at baseline with chronic mcfadden with PMH of MS, HTN, HLD , MS, CVA with left hemiparesis brought in by HHA to ED c/o 2 months of watery stools that is not getting better and now for the past 2 days has developed a fever, generalized weakness, and rash. Patient visited PMD and was started on Levaquin for 2 day around the same time the rash developed. Patient appears to be at baseline, as per home health aid. Otherwise, no shortness of breath, cough, vomiting, new soaps, new lotions, new detergents, new pets, or any other complaints.  Pt was [facility] on Feb [facility] for  Acute respiratory failure 2/2 to Influenza and Pneumonia.     ED course, temp 97.8, , /79, RR 15 and SO2 97% on RA. Labs show WBC 17.7k with left shift, UA positive for UTI and CT a/p shows Rectal distention with liquid stool in keeping with the history of diarrhea. No bowel obstruction. 78 yo F from home has HHA , ROBBI X1 (oriented to person ), bed bound at baseline with chronic mcfadden with PMH of MS, HTN, HLD , DM, MS, CVA with left hemiparesis brought in by HHA to ED c/o 2 months of watery stools that is not getting better and now for the past 2 days has developed a fever, generalized weakness, and rash. Patient visited PMD and was started on Levaquin for 2 day around the same time the rash developed. Patient appears to be at baseline, as per home health aid. Otherwise, no shortness of breath, cough, vomiting, new soaps, new lotions, new detergents, new pets, or any other complaints.  Pt was intubated on Feb at [facility] for  Acute respiratory failure 2/2 to Influenza and Pneumonia.     ED course, temp 97.8, , /79, RR 15 and SO2 97% on RA. Labs show WBC 17.7k with left shift, UA positive for UTI and CT a/p shows Rectal distention with liquid stool in keeping with the history of diarrhea. No bowel obstruction.    GOC: HCP  ( Imer Bates [phone]),  Patient is DNR/DNI. 78 yo F from home has HHA , ROBBI X1 (oriented to person ), bed bound at baseline with chronic mcfadden with PMH of MS, HTN, HLD , DM, MS, CVA with left hemiparesis brought in by HHA to ED c/o 2 months of watery stools that is not getting better and now for the past 2 days has developed a fever, generalized weakness, and rash. Patient visited PMD and was started on Levaquin for 2 day around the same time the rash developed. Patient appears to be at baseline, as per home health aid. Otherwise, no shortness of breath, cough, vomiting, new soaps, new lotions, new detergents, new pets, or any other complaints.  Pt was intubated on Feb at [facility] for  Acute respiratory failure 2/2 to Influenza and Pneumonia.     ED course, temp 97.8, , /79, RR 15 and SO2 97% on RA. Labs show WBC 17.7k with left shift, UA positive for UTI and CT a/p shows Rectal distention with liquid stool in keeping with the history of diarrhea. No bowel obstruction.    GOC: HCP  ( Imer Bates [phone]),  called 3 times can not reach. as per last admission palliative care consults notes on 02/20/2018, Patient is DNR/DNI. 76 yo F from home has HHA , ROBBI X1 (oriented to person ), bed bound at baseline with chronic mcfadden with PMH of MS, HTN, HLD , DM, MS, CVA with left hemiparesis brought in by HHA to ED c/o 2 months of  "watery stools" that is not getting better and now for the past 2 days has developed a fever, generalized weakness, and rash. Patient visited PMD and was started on Levaquin for 2 day around the same time the rash developed. Patient appears to be at baseline, as per home health aid. Otherwise, no shortness of breath, cough, vomiting, new soaps, new lotions, new detergents, new pets, or any other complaints.  Pt was intubated on Feb at [facility] for  Acute respiratory failure 2/2 to Influenza and Pneumonia.     ED course, temp 97.8, , /79, RR 15 and SO2 97% on RA. Labs show WBC 17.7k with left shift, UA positive for UTI and CT a/p shows Rectal distention with liquid stool in keeping with the history of diarrhea. No bowel obstruction. Pt had no diarrhea since in ED.    GOC: HCP  ( Imer Bates [phone]),  called 3 times can not reach. as per last admission palliative care consults notes on 02/20/2018, Patient is DNR/DNI. 76 yo F from home has HHA , ROBBI X1 (oriented to person ), bed bound at baseline with chronic mcfadden with PMH of MS, HTN, HLD , DM, MS, CVA with left hemiparesis brought in by HHA to ED c/o 2 months of  "watery like loose stools" that is not getting better and now for the past 2 days has developed a fever, generalized weakness, and rash. Patient visited PMD and was started on Levaquin for 2 day around the same time the rash developed. Patient appears to be at baseline, as per home health aid. Otherwise, no shortness of breath, cough, vomiting, new soaps, new lotions, new detergents, new pets, or any other complaints.  Pt was intubated on Feb at [facility] for  Acute respiratory failure 2/2 to Influenza and Pneumonia.     ED course, temp 97.8, , /79, RR 15 and SO2 97% on RA. Labs show WBC 17.7k with left shift, UA positive for UTI and CT a/p shows Rectal distention with liquid stool in keeping with the history of diarrhea. No bowel obstruction. Pt had no diarrhea since in ED.    GOC: as per HCP  ( Imer Bates [phone]), Patient is DNR. 78 yo F from home has HHA , ROBBI X1 (oriented to person ), bed bound at baseline with chronic mcfadden with PMH of MS, HTN, HLD , DM, MS, CVA with left hemiparesis brought in by HHA to ED c/o 2 months of ''brown watery stools" that is not getting better and now for the past 2 days has developed a fever, generalized weakness, and rash. Patient visited PMD and was started on Levaquin for 2 day around the same time the rash developed. Patient appears to be at baseline, as per home health aid. Otherwise, no shortness of breath, cough, vomiting, new soaps, new lotions, new detergents, new pets, or any other complaints.  Pt was intubated on Feb at [facility] for  Acute respiratory failure 2/2 to Influenza and Pneumonia.     ED course, temp 97.8, , /79, RR 15 and SO2 97% on RA. Labs show WBC 17.7k with left shift, UA positive for UTI and CT a/p shows Rectal distention with liquid stool in keeping with the history of diarrhea. No bowel obstruction. Pt had no diarrhea since in ED.    GOC: as per HCP  ( Imer Bates [phone]), Patient is DNR.

## 2018-07-21 NOTE — H&P ADULT - NSHPLABSRESULTS_GEN_ALL_CORE
Vital Signs Last 24 Hrs  T(C): 37.1 (21 Jul 2018 03:35), Max: 37.1 (20 Jul 2018 20:36)  T(F): 98.7 (21 Jul 2018 03:35), Max: 98.7 (20 Jul 2018 20:36)  HR: 106 (21 Jul 2018 03:35) (99 - 112)  BP: 151/71 (21 Jul 2018 03:35) (151/71 - 189/79)  BP(mean): --  RR: 16 (21 Jul 2018 03:35) (15 - 18)  SpO2: 100% (21 Jul 2018 03:35) (97% - 100%)    Comprehensive Metabolic Panel (07.20.18 @ 21:36)    Sodium, Serum: 135 mmol/L    Potassium, Serum: 3.6 mmol/L    Chloride, Serum: 103 mmol/L    Carbon Dioxide, Serum: 23 mmol/L    Anion Gap, Serum: 9 mmol/L    Blood Urea Nitrogen, Serum: 32 mg/dL    Creatinine, Serum: 1.56 mg/dL    Glucose, Serum: 155 mg/dL    Calcium, Total Serum: 8.9 mg/dL    Protein Total, Serum: 7.1 g/dL    Albumin, Serum: 3.3 g/dL    Bilirubin Total, Serum: 0.3 mg/dL    Alkaline Phosphatase, Serum: 118 U/L    Aspartate Aminotransferase (AST/SGOT): 14 U/L    Alanine Aminotransferase (ALT/SGPT): 15 U/L DA    eGFR if Non : 31: Interpretative comment  The units for eGFR are ml/min/1.73m2 (normalized body surface area). The  eGFR is calculated from a serum creatinine using the CKD-EPI equation.  Other variables required for calculation are race, age and sex. Among  patients with chronic kidney disease (CKD), the eGFR is useful in  determining the stage of disease according to KDOQI CKD classification.  All eGFR results are reported numerically with the following  interpretation.          GFR                    With                 Without     (ml/min/1.73 m2)    Kidney Damage       Kidney Damage        >= 90                    Stage 1                     Normal        60-89                    Stage 2                     Decreased GFR        30-59     Stage 3                     Stage 3        15-29                    Stage 4                     Stage 4        < 15                      Stage 5                     Stage 5  Each stage of CKD assumes that the associated GFR level has been in  effect for at least 3 months. Determination of stages one and two (with  eGFR > 59 ml/min/m2) requires estimation of kidney damage for at least 3  months as defined by structural or functional abnormalities.  Limitations: All estimates of GFR will be less accurate for patients at  extremes of muscle mass (including but not limited to frail elderly,  critically ill, or cancer patients), those with unusual diets, and those  with conditions associated with reduced secretion or extrarenal  elimination of creatinine. The eGFR equation is not recommended for use  in patients with unstable creatinine levels. mL/min/1.73M2    eGFR if African American: 36 mL/min/1.73M2      Complete Blood Count + Automated Diff (07.20.18 @ 21:36)    WBC Count: 17.7 K/uL    RBC Count: 4.48 M/uL    Hemoglobin: 12.5 g/dL    Hematocrit: 38.3 %    Mean Cell Volume: 85.5 fl    Mean Cell Hemoglobin: 27.9 pg    Mean Cell Hemoglobin Conc: 32.7 gm/dL    Red Cell Distrib Width: 13.2 %    Platelet Count - Automated: 262 K/uL    Auto Neutrophil #: 14.4 K/uL    Auto Lymphocyte #: 1.8 K/uL    Auto Monocyte #: 1.3 K/uL    Auto Eosinophil #: 0.1 K/uL    Auto Basophil #: 0.2 K/uL    Auto Neutrophil %: 81.1: Differential percentages must be correlated with absolute numbers for  clinical significance. %    Auto Lymphocyte %: 9.9 %    Auto Monocyte %: 7.4 %    Auto Eosinophil %: 0.4 %    Auto Basophil %: 1.1 %      < from: CT Abdomen and Pelvis No Cont (07.20.18 @ 23:58) >    IMPRESSION: Rectal distention with liquid stool in keeping with the   history of diarrhea. No bowel obstruction.    < end of copied text >

## 2018-07-21 NOTE — H&P ADULT - PROBLEM SELECTOR PLAN 2
UA positive for mcfadden related UTI   on rocephin  f/u urine culture UA positive for mcfadden related UTI   on rocephin  f/u urine culture  ID Dr. Grewal

## 2018-07-21 NOTE — H&P ADULT - PROBLEM SELECTOR PLAN 1
Scattered erythematous, blanching, not raised, not target lesion, not vesicle, throughout the body sparing the palms and soles  likely allergic to levaquin    on claritin and hydrocortisone cream

## 2018-07-21 NOTE — H&P ADULT - PROBLEM SELECTOR PLAN 8
IMPROVE VTE Individual Risk Assessment    RISK                                                          Points  [] Previous VTE                                           3  [] Thrombophilia                                        2  [] Lower limb paralysis                              2   [] Current Cancer                                       2   [x] Immobilization > 24 hrs                        1  [] ICU/CCU stay > 24 hours                       1  [x] Age > 60                                                   1    IMPROVE VTE Score: 2  need DVT ppx, on heparin drip  no need for GI ppx

## 2018-07-21 NOTE — H&P ADULT - PROBLEM SELECTOR PLAN 3
c/w losartan and coreg  monitor BP closely as per HCP, pt had watery like loose stool for 2 mons  likely enterocolitis   f/u stool culture and GI pcr  started on rocephin and flagyl as per HCP, pt had watery  stool for 2 mons  likely enterocolitis , r/o c. diff  f/u stool culture and GI pcr  f/u c. diff   started on rocephin and flagyl as per HCP, pt had watery  stool for 2 mons  likely enterocolitis , r/o c. diff  started on rocephin and flagyl  f/u stool culture and GI pcr  f/u c. diff   ID Dr. Grewal

## 2018-07-21 NOTE — H&P ADULT - PROBLEM SELECTOR PLAN 7
IMPROVE VTE Individual Risk Assessment    RISK                                                          Points  [] Previous VTE                                           3  [] Thrombophilia                                        2  [] Lower limb paralysis                              2   [] Current Cancer                                       2   [x] Immobilization > 24 hrs                        1  [] ICU/CCU stay > 24 hours                       1  [x] Age > 60                                                   1    IMPROVE VTE Score: 2  need DVT ppx, on heparin drip  no need for GI ppx C/w home baclofen, Citalopram and donepezil

## 2018-07-21 NOTE — H&P ADULT - ASSESSMENT
76 yo F from home has HHA ( Imer Bates [phone]), AAO X1 (oriented to person ),  bed bound at baseline with chronic mcfadden with PMH of MS, HTN, HLD , MS, CVA with left hemiparesis brought in by HHA to ED c/o 2 months of watery stools that is not getting better and now for the past 2 days has developed a fever, generalized weakness, and rash. Patient visited PMD and was started on Levaquin for 2 day around the same time the rash developed.  Labs show WBC 17.7k with left shift, UA positive for UTI and CT a/p shows Rectal distention with liquid stool in keeping with the history of diarrhea. No bowel obstruction. 76 yo F from home has ANDER , ROBBI X1 (oriented to person ),  bed bound at baseline with chronic mcfadden with PMH of MS, HTN, HLD , DM, MS, CVA with left hemiparesis brought in by ANDER to ED c/o 2 months of watery stools that is not getting better and now for the past 2 days has developed a fever, generalized weakness, and rash. Patient visited PMD and was started on Levaquin for 2 day around the same time the rash developed.  Labs show WBC 17.7k with left shift, UA positive for UTI and CT a/p shows Rectal distention with liquid stool in keeping with the history of diarrhea. No bowel obstruction. 76 yo F from home has ANDER , ROBBI X1 (oriented to person ),  bed bound at baseline with chronic mcfadden with PMH of MS, HTN, HLD , DM, MS, CVA with left hemiparesis brought in by ANDER to ED c/o 2 months of watery like loose stools that is not getting better and now for the past 2 days has developed a fever, generalized weakness, and rash. Patient visited PMD and was started on Levaquin for 2 day around the same time the rash developed.  Labs show WBC 17.7k with left shift, UA positive for UTI and CT a/p shows Rectal distention with liquid stool in keeping with the history of diarrhea. No bowel obstruction.

## 2018-07-21 NOTE — CONSULT NOTE ADULT - SUBJECTIVE AND OBJECTIVE BOX
HPI:  78 yo F from home has HHA , AAO X1 (oriented to person ), bed bound at baseline with chronic mcfadden with PMH of MS, HTN, HLD , DM, MS, CVA with left hemiparesis brought in by HHA to ED c/o 2 months of ''brown watery stools" that is not getting better and now for the past 2 days has developed a fever, generalized weakness, and rash. Patient visited PMD and was started on Levaquin for 2 day around the same time the rash developed. Patient appears to be at baseline, as per home health aid. Otherwise, no shortness of breath,, vomiting, new soaps, new lotions, new detergents, new pets, or abdominal pian .  Pt was intubated on Feb at [facility] for  Acute respiratory failure 2/2 to Influenza and Pneumonia. says is coughing with inability to bring up phlegm .     ED course, temp 97.8, , /79, RR 15 and SO2 97% on RA. Labs show WBC 17.7k with left shift, UA positive for UTI, cxr neg for consolidation  and CT a/p shows Rectal distention with liquid stool in keeping with the history of diarrhea. No bowel obstruction. Pt had no diarrhea since in ED.ID called for eval of appr ab for pna     GOC: as per HCP  ( Imer Bates [phone]), Patient is DNR. (2018 01:07)      PAST MEDICAL & SURGICAL HISTORY:  MS (multiple sclerosis)  CVA (cerebral vascular accident)  Diabetes mellitus  Hypertension  Hypothyroidism  No significant past surgical history    allergy   Allergy Status Unknown    meds   aspirin  chewable 81 milliGRAM(s) Oral daily  atorvastatin 40 milliGRAM(s) Oral at bedtime  baclofen 10 milliGRAM(s) Oral three times a day  carvedilol 6.25 milliGRAM(s) Oral every 12 hours  cefTRIAXone   IVPB 1 Gram(s) IV Intermittent every 24 hours  citalopram 5 milliGRAM(s) Oral daily  dextrose 40% Gel 15 Gram(s) Oral once PRN  dextrose 5%. 1000 milliLiter(s) IV Continuous <Continuous>  dextrose 50% Injectable 12.5 Gram(s) IV Push once  dextrose 50% Injectable 25 Gram(s) IV Push once  dextrose 50% Injectable 25 Gram(s) IV Push once  donepezil 10 milliGRAM(s) Oral at bedtime  glucagon  Injectable 1 milliGRAM(s) IntraMuscular once PRN  heparin  Injectable 5000 Unit(s) SubCutaneous every 8 hours  hydrocortisone 0.5% Cream 1 Application(s) Topical three times a day  insulin lispro (HumaLOG) corrective regimen sliding scale   SubCutaneous Before meals and at bedtime  levothyroxine 25 MICROGram(s) Oral daily  loratadine 10 milliGRAM(s) Oral daily  losartan 50 milliGRAM(s) Oral daily  memantine 10 milliGRAM(s) Oral daily  metroNIDAZOLE  IVPB 500 milliGRAM(s) IV Intermittent every 8 hours      Social Hx: no smoking no etoh     FAMILY HISTORY:  No pertinent family history in first degree relatives        ROS  [  ] UNABLE TO ELICIT    General:  [x  ] Fever   [ x ] Malaise    Skin: rash that is resolving hc cream     HEENT:  [ - ] Sore Throat  [ - ] Photophobia	    Chest: [-  ] SOB  [ x ] Cough     Cardiovascular: [ - ] CP	no pnd no orthopnea     Gastrointestinal: [-  ] Abd pain   [-  ] N    [ - ] V   [ x ] Diarrhea that is resolving 	    Genitourinary: [ - ] Polyuria   [-  ] Urgency   [-  ] Dysuria    [-  ]  Hematuria	    Musculoskeletal:  [ - ] Back Pain	    Neurological: [-  ]Dizziness  [-  ]Visual Disturbance  [ - ]Headaches      PHYSICAL EXAM:    Vital Signs Last 24 Hrs  T(C): 36.8 (2018 14:26), Max: 37.1 (2018 20:36)  T(F): 98.2 (2018 14:26), Max: 98.7 (2018 20:36)  HR: 94 (2018 14:26) (94 - 112)  BP: 129/55 (2018 14:26) (129/55 - 189/79)  BP(mean): --  RR: 18 (2018 14:26) (15 - 18)  SpO2: 99% (2018 14:26) (97% - 100%)    Constitutional: awake confused , nad    EUSEBIA SCLERA anicteric EOMI   LUNGS clear   CVS s1 s2 aud no murmurs   ABDOMEN soft non tender no HSM   NEUROLOGY  no defecits   SKIN no rash   EXTREMITIES no edema no cyanosis   mcfadden in place         LABS/DIAGNOSTIC TESTS                          11.2   10.0  )-----------( 205      ( 2018 10:49 )             35.2     WBC Count: 10.0 K/uL ( @ 10:49)  WBC Count: 17.7 K/uL ( @ 21:36)          138  |  107  |  29<H>  ----------------------------<  256<H>  3.3<L>   |  21<L>  |  1.76<H>    Ca    8.7      2018 10:49    TPro  7.1  /  Alb  3.3<L>  /  TBili  0.3  /  DBili  x   /  AST  14  /  ALT  15  /  AlkPhos  118        Urinalysis Basic - ( 2018 22:50 )    Color: Yellow / Appearance: Clear / S.015 / pH: x  Gluc: x / Ketone: Negative  / Bili: Negative / Urobili: Negative   Blood: x / Protein: 30 mg/dL / Nitrite: Negative   Leuk Esterase: Moderate / RBC: 5-10 /HPF / WBC 11-25 /HPF   Sq Epi: x / Non Sq Epi: Moderate /HPF / Bacteria: Moderate /HPF        LIVER FUNCTIONS - ( 2018 21:36 )  Alb: 3.3 g/dL / Pro: 7.1 g/dL / ALK PHOS: 118 U/L / ALT: 15 U/L DA / AST: 14 U/L / GGT: x                 LACTATE:Lactate, Blood: 1.3 mmol/L ( @ 21:36)              RADIOLOGY  < from: CT Abdomen and Pelvis No Cont (18 @ 23:58) >  EXAM:  CT ABDOMEN AND PELVIS                            PROCEDURE DATE:  2018          INTERPRETATION:  CLINICAL INFORMATION: Abdominal pain and diarrhea    COMPARISON: None    PROCEDURE:   CT of the Abdomen and Pelvis was performed without intravenous contrast.   Intravenous contrast: None.  Oral contrast: None.  Sagittal and coronal reformats were performed.    FINDINGS: Study is limited by motion artifact.    LOWER CHEST: Trace right pleural effusion    LIVER: Within normal limits.  BILE DUCTS: Normal caliber.  GALLBLADDER: Sludge in the gallbladder lumen.  SPLEEN: Within normal limits.  PANCREAS: Within normal limits.  ADRENALS: Within normal limits.  KIDNEYS/URETERS: Atrophic kidneys..    BLADDER: Collapsed around a Mcfadden catheter balloon.  REPRODUCTIVE ORGANS: Query subtotal hysterectomy.     BOWEL: Rectum is distended with liquid stool. No bowel obstruction.   Appendix normal.  PERITONEUM: No ascites.  VESSELS:  Within normal limits.  RETROPERITONEUM: No lymphadenopathy.    ABDOMINAL WALL: Within normal limits.  BONES: Within normal limits.    IMPRESSION: Rectal distention with liquid stool in keeping with the   history of diarrhea. No bowel obstruction.                SCOT RUBIO M.D., ATTENDING RADIOLOGIST  This document has been electronically signed. 2018 12:46AM    < end of copied text >      < from: Xray Chest 1 View AP/PA (18 @ 22:36) >    EXAM:  XR CHEST AP OR PA 1V                            PROCEDURE DATE:  2018          INTERPRETATION:  PORTABLE CHEST X-RAY    HISTORY: Fever.    COMPARISON: 2018.    FINDINGS:  No focal lung consolidation.  No pneumothorax or pleural effusion.   The cardiac silhouette is not accurately assessed by AP technique.    IMPRESSION:  No focal lung consolidation.    < end of copied text >

## 2018-07-22 LAB
ANION GAP SERPL CALC-SCNC: 11 MMOL/L — SIGNIFICANT CHANGE UP (ref 5–17)
BASOPHILS # BLD AUTO: 0.1 K/UL — SIGNIFICANT CHANGE UP (ref 0–0.2)
BASOPHILS NFR BLD AUTO: 0.8 % — SIGNIFICANT CHANGE UP (ref 0–2)
BUN SERPL-MCNC: 38 MG/DL — HIGH (ref 7–18)
C DIFF BY PCR RESULT: SIGNIFICANT CHANGE UP
C DIFF TOX GENS STL QL NAA+PROBE: SIGNIFICANT CHANGE UP
CALCIUM SERPL-MCNC: 8.2 MG/DL — LOW (ref 8.4–10.5)
CHLORIDE SERPL-SCNC: 112 MMOL/L — HIGH (ref 96–108)
CO2 SERPL-SCNC: 21 MMOL/L — LOW (ref 22–31)
CREAT SERPL-MCNC: 1.77 MG/DL — HIGH (ref 0.5–1.3)
CULTURE RESULTS: SIGNIFICANT CHANGE UP
EOSINOPHIL # BLD AUTO: 0.3 K/UL — SIGNIFICANT CHANGE UP (ref 0–0.5)
EOSINOPHIL NFR BLD AUTO: 2.6 % — SIGNIFICANT CHANGE UP (ref 0–6)
GLUCOSE BLDC GLUCOMTR-MCNC: 103 MG/DL — HIGH (ref 70–99)
GLUCOSE BLDC GLUCOMTR-MCNC: 103 MG/DL — HIGH (ref 70–99)
GLUCOSE BLDC GLUCOMTR-MCNC: 106 MG/DL — HIGH (ref 70–99)
GLUCOSE BLDC GLUCOMTR-MCNC: 177 MG/DL — HIGH (ref 70–99)
GLUCOSE SERPL-MCNC: 102 MG/DL — HIGH (ref 70–99)
HCT VFR BLD CALC: 30.4 % — LOW (ref 34.5–45)
HGB BLD-MCNC: 9.7 G/DL — LOW (ref 11.5–15.5)
LYMPHOCYTES # BLD AUTO: 2.1 K/UL — SIGNIFICANT CHANGE UP (ref 1–3.3)
LYMPHOCYTES # BLD AUTO: 21.7 % — SIGNIFICANT CHANGE UP (ref 13–44)
MCHC RBC-ENTMCNC: 28.1 PG — SIGNIFICANT CHANGE UP (ref 27–34)
MCHC RBC-ENTMCNC: 32 GM/DL — SIGNIFICANT CHANGE UP (ref 32–36)
MCV RBC AUTO: 87.8 FL — SIGNIFICANT CHANGE UP (ref 80–100)
MONOCYTES # BLD AUTO: 0.9 K/UL — SIGNIFICANT CHANGE UP (ref 0–0.9)
MONOCYTES NFR BLD AUTO: 9.5 % — SIGNIFICANT CHANGE UP (ref 2–14)
NEUTROPHILS # BLD AUTO: 6.4 K/UL — SIGNIFICANT CHANGE UP (ref 1.8–7.4)
NEUTROPHILS NFR BLD AUTO: 65.3 % — SIGNIFICANT CHANGE UP (ref 43–77)
PLATELET # BLD AUTO: 170 K/UL — SIGNIFICANT CHANGE UP (ref 150–400)
POTASSIUM SERPL-MCNC: 3.1 MMOL/L — LOW (ref 3.5–5.3)
POTASSIUM SERPL-SCNC: 3.1 MMOL/L — LOW (ref 3.5–5.3)
RBC # BLD: 3.47 M/UL — LOW (ref 3.8–5.2)
RBC # FLD: 13.4 % — SIGNIFICANT CHANGE UP (ref 10.3–14.5)
SODIUM SERPL-SCNC: 144 MMOL/L — SIGNIFICANT CHANGE UP (ref 135–145)
SPECIMEN SOURCE: SIGNIFICANT CHANGE UP
WBC # BLD: 9.8 K/UL — SIGNIFICANT CHANGE UP (ref 3.8–10.5)
WBC # FLD AUTO: 9.8 K/UL — SIGNIFICANT CHANGE UP (ref 3.8–10.5)

## 2018-07-22 RX ADMIN — CARVEDILOL PHOSPHATE 6.25 MILLIGRAM(S): 80 CAPSULE, EXTENDED RELEASE ORAL at 06:36

## 2018-07-22 RX ADMIN — Medication 25 MICROGRAM(S): at 06:36

## 2018-07-22 RX ADMIN — Medication 10 MILLIGRAM(S): at 06:37

## 2018-07-22 RX ADMIN — CEFTRIAXONE 100 GRAM(S): 500 INJECTION, POWDER, FOR SOLUTION INTRAMUSCULAR; INTRAVENOUS at 01:20

## 2018-07-22 RX ADMIN — LOSARTAN POTASSIUM 50 MILLIGRAM(S): 100 TABLET, FILM COATED ORAL at 06:36

## 2018-07-22 RX ADMIN — Medication 1 APPLICATION(S): at 21:56

## 2018-07-22 RX ADMIN — CITALOPRAM 5 MILLIGRAM(S): 10 TABLET, FILM COATED ORAL at 11:29

## 2018-07-22 RX ADMIN — MEMANTINE HYDROCHLORIDE 10 MILLIGRAM(S): 10 TABLET ORAL at 11:30

## 2018-07-22 RX ADMIN — CARVEDILOL PHOSPHATE 6.25 MILLIGRAM(S): 80 CAPSULE, EXTENDED RELEASE ORAL at 17:04

## 2018-07-22 RX ADMIN — HEPARIN SODIUM 5000 UNIT(S): 5000 INJECTION INTRAVENOUS; SUBCUTANEOUS at 14:00

## 2018-07-22 RX ADMIN — HEPARIN SODIUM 5000 UNIT(S): 5000 INJECTION INTRAVENOUS; SUBCUTANEOUS at 06:37

## 2018-07-22 RX ADMIN — Medication 1: at 21:57

## 2018-07-22 RX ADMIN — Medication 100 MILLIGRAM(S): at 14:00

## 2018-07-22 RX ADMIN — Medication 10 MILLIGRAM(S): at 14:01

## 2018-07-22 RX ADMIN — HEPARIN SODIUM 5000 UNIT(S): 5000 INJECTION INTRAVENOUS; SUBCUTANEOUS at 21:57

## 2018-07-22 RX ADMIN — Medication 1 APPLICATION(S): at 14:02

## 2018-07-22 RX ADMIN — DONEPEZIL HYDROCHLORIDE 10 MILLIGRAM(S): 10 TABLET, FILM COATED ORAL at 21:56

## 2018-07-22 RX ADMIN — Medication 100 MILLIGRAM(S): at 21:57

## 2018-07-22 RX ADMIN — Medication 100 MILLIGRAM(S): at 06:37

## 2018-07-22 RX ADMIN — Medication 1 APPLICATION(S): at 06:37

## 2018-07-22 RX ADMIN — Medication 81 MILLIGRAM(S): at 11:30

## 2018-07-22 RX ADMIN — ATORVASTATIN CALCIUM 40 MILLIGRAM(S): 80 TABLET, FILM COATED ORAL at 21:57

## 2018-07-22 RX ADMIN — LORATADINE 10 MILLIGRAM(S): 10 TABLET ORAL at 11:30

## 2018-07-22 RX ADMIN — Medication 10 MILLIGRAM(S): at 21:56

## 2018-07-22 NOTE — PHYSICAL THERAPY INITIAL EVALUATION ADULT - LIVES WITH, PROFILE
Unable to reach out to listed phone number. Patient stats that she lives with . As per chart patient has HHA but unable to obtain hrs/days of HHA.

## 2018-07-23 DIAGNOSIS — E03.9 HYPOTHYROIDISM, UNSPECIFIED: ICD-10-CM

## 2018-07-23 DIAGNOSIS — E78.5 HYPERLIPIDEMIA, UNSPECIFIED: ICD-10-CM

## 2018-07-23 DIAGNOSIS — I10 ESSENTIAL (PRIMARY) HYPERTENSION: ICD-10-CM

## 2018-07-23 DIAGNOSIS — L30.9 DERMATITIS, UNSPECIFIED: ICD-10-CM

## 2018-07-23 DIAGNOSIS — Z02.9 ENCOUNTER FOR ADMINISTRATIVE EXAMINATIONS, UNSPECIFIED: ICD-10-CM

## 2018-07-23 DIAGNOSIS — E11.9 TYPE 2 DIABETES MELLITUS WITHOUT COMPLICATIONS: ICD-10-CM

## 2018-07-23 DIAGNOSIS — R19.7 DIARRHEA, UNSPECIFIED: ICD-10-CM

## 2018-07-23 LAB
-  AMPICILLIN: SIGNIFICANT CHANGE UP
-  CIPROFLOXACIN: SIGNIFICANT CHANGE UP
-  LEVOFLOXACIN: SIGNIFICANT CHANGE UP
-  NITROFURANTOIN: SIGNIFICANT CHANGE UP
-  TETRACYCLINE: SIGNIFICANT CHANGE UP
-  VANCOMYCIN: SIGNIFICANT CHANGE UP
ANION GAP SERPL CALC-SCNC: 11 MMOL/L — SIGNIFICANT CHANGE UP (ref 5–17)
BASOPHILS # BLD AUTO: 0.1 K/UL — SIGNIFICANT CHANGE UP (ref 0–0.2)
BASOPHILS NFR BLD AUTO: 0.8 % — SIGNIFICANT CHANGE UP (ref 0–2)
BUN SERPL-MCNC: 41 MG/DL — HIGH (ref 7–18)
CALCIUM SERPL-MCNC: 8.1 MG/DL — LOW (ref 8.4–10.5)
CHLORIDE SERPL-SCNC: 113 MMOL/L — HIGH (ref 96–108)
CO2 SERPL-SCNC: 21 MMOL/L — LOW (ref 22–31)
CREAT SERPL-MCNC: 1.92 MG/DL — HIGH (ref 0.5–1.3)
CULTURE RESULTS: SIGNIFICANT CHANGE UP
EOSINOPHIL # BLD AUTO: 0.3 K/UL — SIGNIFICANT CHANGE UP (ref 0–0.5)
EOSINOPHIL NFR BLD AUTO: 3.2 % — SIGNIFICANT CHANGE UP (ref 0–6)
GLUCOSE BLDC GLUCOMTR-MCNC: 118 MG/DL — HIGH (ref 70–99)
GLUCOSE BLDC GLUCOMTR-MCNC: 121 MG/DL — HIGH (ref 70–99)
GLUCOSE BLDC GLUCOMTR-MCNC: 159 MG/DL — HIGH (ref 70–99)
GLUCOSE BLDC GLUCOMTR-MCNC: 291 MG/DL — HIGH (ref 70–99)
GLUCOSE SERPL-MCNC: 107 MG/DL — HIGH (ref 70–99)
HCT VFR BLD CALC: 32.5 % — LOW (ref 34.5–45)
HGB BLD-MCNC: 10 G/DL — LOW (ref 11.5–15.5)
LYMPHOCYTES # BLD AUTO: 2.2 K/UL — SIGNIFICANT CHANGE UP (ref 1–3.3)
LYMPHOCYTES # BLD AUTO: 21.8 % — SIGNIFICANT CHANGE UP (ref 13–44)
MCHC RBC-ENTMCNC: 27.4 PG — SIGNIFICANT CHANGE UP (ref 27–34)
MCHC RBC-ENTMCNC: 30.9 GM/DL — LOW (ref 32–36)
MCV RBC AUTO: 88.8 FL — SIGNIFICANT CHANGE UP (ref 80–100)
METHOD TYPE: SIGNIFICANT CHANGE UP
MONOCYTES # BLD AUTO: 0.9 K/UL — SIGNIFICANT CHANGE UP (ref 0–0.9)
MONOCYTES NFR BLD AUTO: 8.9 % — SIGNIFICANT CHANGE UP (ref 2–14)
NEUTROPHILS # BLD AUTO: 6.7 K/UL — SIGNIFICANT CHANGE UP (ref 1.8–7.4)
NEUTROPHILS NFR BLD AUTO: 65.4 % — SIGNIFICANT CHANGE UP (ref 43–77)
ORGANISM # SPEC MICROSCOPIC CNT: SIGNIFICANT CHANGE UP
ORGANISM # SPEC MICROSCOPIC CNT: SIGNIFICANT CHANGE UP
PLATELET # BLD AUTO: 181 K/UL — SIGNIFICANT CHANGE UP (ref 150–400)
POTASSIUM SERPL-MCNC: 3.4 MMOL/L — LOW (ref 3.5–5.3)
POTASSIUM SERPL-SCNC: 3.4 MMOL/L — LOW (ref 3.5–5.3)
RBC # BLD: 3.66 M/UL — LOW (ref 3.8–5.2)
RBC # FLD: 13.8 % — SIGNIFICANT CHANGE UP (ref 10.3–14.5)
SODIUM SERPL-SCNC: 145 MMOL/L — SIGNIFICANT CHANGE UP (ref 135–145)
SPECIMEN SOURCE: SIGNIFICANT CHANGE UP
WBC # BLD: 10.3 K/UL — SIGNIFICANT CHANGE UP (ref 3.8–10.5)
WBC # FLD AUTO: 10.3 K/UL — SIGNIFICANT CHANGE UP (ref 3.8–10.5)

## 2018-07-23 RX ORDER — POTASSIUM CHLORIDE 20 MEQ
40 PACKET (EA) ORAL ONCE
Qty: 0 | Refills: 0 | Status: DISCONTINUED | OUTPATIENT
Start: 2018-07-23 | End: 2018-07-23

## 2018-07-23 RX ORDER — POTASSIUM CHLORIDE 20 MEQ
40 PACKET (EA) ORAL ONCE
Qty: 0 | Refills: 0 | Status: COMPLETED | OUTPATIENT
Start: 2018-07-23 | End: 2018-07-23

## 2018-07-23 RX ADMIN — Medication 100 MILLIGRAM(S): at 22:29

## 2018-07-23 RX ADMIN — Medication 10 MILLIGRAM(S): at 13:47

## 2018-07-23 RX ADMIN — Medication 1 APPLICATION(S): at 22:31

## 2018-07-23 RX ADMIN — HEPARIN SODIUM 5000 UNIT(S): 5000 INJECTION INTRAVENOUS; SUBCUTANEOUS at 13:47

## 2018-07-23 RX ADMIN — Medication 10 MILLIGRAM(S): at 05:40

## 2018-07-23 RX ADMIN — Medication 25 MICROGRAM(S): at 05:40

## 2018-07-23 RX ADMIN — Medication 100 MILLIGRAM(S): at 05:39

## 2018-07-23 RX ADMIN — Medication 10 MILLIGRAM(S): at 22:29

## 2018-07-23 RX ADMIN — Medication 81 MILLIGRAM(S): at 12:24

## 2018-07-23 RX ADMIN — Medication 1 APPLICATION(S): at 13:47

## 2018-07-23 RX ADMIN — Medication 100 MILLIGRAM(S): at 13:47

## 2018-07-23 RX ADMIN — LORATADINE 10 MILLIGRAM(S): 10 TABLET ORAL at 12:24

## 2018-07-23 RX ADMIN — CITALOPRAM 5 MILLIGRAM(S): 10 TABLET, FILM COATED ORAL at 12:24

## 2018-07-23 RX ADMIN — HEPARIN SODIUM 5000 UNIT(S): 5000 INJECTION INTRAVENOUS; SUBCUTANEOUS at 22:32

## 2018-07-23 RX ADMIN — Medication 1: at 23:14

## 2018-07-23 RX ADMIN — MEMANTINE HYDROCHLORIDE 10 MILLIGRAM(S): 10 TABLET ORAL at 12:24

## 2018-07-23 RX ADMIN — DONEPEZIL HYDROCHLORIDE 10 MILLIGRAM(S): 10 TABLET, FILM COATED ORAL at 22:30

## 2018-07-23 RX ADMIN — CARVEDILOL PHOSPHATE 6.25 MILLIGRAM(S): 80 CAPSULE, EXTENDED RELEASE ORAL at 18:02

## 2018-07-23 RX ADMIN — CEFTRIAXONE 100 GRAM(S): 500 INJECTION, POWDER, FOR SOLUTION INTRAMUSCULAR; INTRAVENOUS at 01:25

## 2018-07-23 RX ADMIN — Medication 1 APPLICATION(S): at 05:40

## 2018-07-23 RX ADMIN — HEPARIN SODIUM 5000 UNIT(S): 5000 INJECTION INTRAVENOUS; SUBCUTANEOUS at 05:40

## 2018-07-23 RX ADMIN — ATORVASTATIN CALCIUM 40 MILLIGRAM(S): 80 TABLET, FILM COATED ORAL at 22:32

## 2018-07-23 RX ADMIN — Medication 3: at 12:22

## 2018-07-23 RX ADMIN — Medication 40 MILLIEQUIVALENT(S): at 18:03

## 2018-07-24 DIAGNOSIS — N39.0 URINARY TRACT INFECTION, SITE NOT SPECIFIED: ICD-10-CM

## 2018-07-24 LAB
ANION GAP SERPL CALC-SCNC: 7 MMOL/L — SIGNIFICANT CHANGE UP (ref 5–17)
BUN SERPL-MCNC: 36 MG/DL — HIGH (ref 7–18)
CALCIUM SERPL-MCNC: 8.2 MG/DL — LOW (ref 8.4–10.5)
CHLORIDE SERPL-SCNC: 116 MMOL/L — HIGH (ref 96–108)
CO2 SERPL-SCNC: 22 MMOL/L — SIGNIFICANT CHANGE UP (ref 22–31)
CREAT SERPL-MCNC: 1.76 MG/DL — HIGH (ref 0.5–1.3)
CULTURE RESULTS: SIGNIFICANT CHANGE UP
GLUCOSE BLDC GLUCOMTR-MCNC: 131 MG/DL — HIGH (ref 70–99)
GLUCOSE BLDC GLUCOMTR-MCNC: 202 MG/DL — HIGH (ref 70–99)
GLUCOSE BLDC GLUCOMTR-MCNC: 259 MG/DL — HIGH (ref 70–99)
GLUCOSE BLDC GLUCOMTR-MCNC: 91 MG/DL — SIGNIFICANT CHANGE UP (ref 70–99)
GLUCOSE SERPL-MCNC: 129 MG/DL — HIGH (ref 70–99)
MAGNESIUM SERPL-MCNC: 2.1 MG/DL — SIGNIFICANT CHANGE UP (ref 1.6–2.6)
PHOSPHATE SERPL-MCNC: 2.5 MG/DL — SIGNIFICANT CHANGE UP (ref 2.5–4.5)
POTASSIUM SERPL-MCNC: 3.9 MMOL/L — SIGNIFICANT CHANGE UP (ref 3.5–5.3)
POTASSIUM SERPL-SCNC: 3.9 MMOL/L — SIGNIFICANT CHANGE UP (ref 3.5–5.3)
SODIUM SERPL-SCNC: 145 MMOL/L — SIGNIFICANT CHANGE UP (ref 135–145)
SPECIMEN SOURCE: SIGNIFICANT CHANGE UP

## 2018-07-24 RX ORDER — AMOXICILLIN 250 MG/5ML
500 SUSPENSION, RECONSTITUTED, ORAL (ML) ORAL
Qty: 0 | Refills: 0 | Status: DISCONTINUED | OUTPATIENT
Start: 2018-07-24 | End: 2018-07-26

## 2018-07-24 RX ADMIN — Medication 1 APPLICATION(S): at 06:29

## 2018-07-24 RX ADMIN — Medication 10 MILLIGRAM(S): at 14:50

## 2018-07-24 RX ADMIN — CARVEDILOL PHOSPHATE 6.25 MILLIGRAM(S): 80 CAPSULE, EXTENDED RELEASE ORAL at 06:29

## 2018-07-24 RX ADMIN — HEPARIN SODIUM 5000 UNIT(S): 5000 INJECTION INTRAVENOUS; SUBCUTANEOUS at 06:29

## 2018-07-24 RX ADMIN — Medication 3: at 12:44

## 2018-07-24 RX ADMIN — Medication 25 MICROGRAM(S): at 06:29

## 2018-07-24 RX ADMIN — Medication 2: at 21:49

## 2018-07-24 RX ADMIN — MEMANTINE HYDROCHLORIDE 10 MILLIGRAM(S): 10 TABLET ORAL at 11:26

## 2018-07-24 RX ADMIN — CARVEDILOL PHOSPHATE 6.25 MILLIGRAM(S): 80 CAPSULE, EXTENDED RELEASE ORAL at 17:21

## 2018-07-24 RX ADMIN — LOSARTAN POTASSIUM 50 MILLIGRAM(S): 100 TABLET, FILM COATED ORAL at 06:29

## 2018-07-24 RX ADMIN — Medication 1 APPLICATION(S): at 14:50

## 2018-07-24 RX ADMIN — HEPARIN SODIUM 5000 UNIT(S): 5000 INJECTION INTRAVENOUS; SUBCUTANEOUS at 21:49

## 2018-07-24 RX ADMIN — LORATADINE 10 MILLIGRAM(S): 10 TABLET ORAL at 11:26

## 2018-07-24 RX ADMIN — CEFTRIAXONE 100 GRAM(S): 500 INJECTION, POWDER, FOR SOLUTION INTRAMUSCULAR; INTRAVENOUS at 02:02

## 2018-07-24 RX ADMIN — ATORVASTATIN CALCIUM 40 MILLIGRAM(S): 80 TABLET, FILM COATED ORAL at 21:49

## 2018-07-24 RX ADMIN — Medication 1 APPLICATION(S): at 21:48

## 2018-07-24 RX ADMIN — Medication 10 MILLIGRAM(S): at 21:49

## 2018-07-24 RX ADMIN — Medication 81 MILLIGRAM(S): at 11:26

## 2018-07-24 RX ADMIN — Medication 100 MILLIGRAM(S): at 14:50

## 2018-07-24 RX ADMIN — DONEPEZIL HYDROCHLORIDE 10 MILLIGRAM(S): 10 TABLET, FILM COATED ORAL at 21:49

## 2018-07-24 RX ADMIN — HEPARIN SODIUM 5000 UNIT(S): 5000 INJECTION INTRAVENOUS; SUBCUTANEOUS at 14:50

## 2018-07-24 RX ADMIN — CITALOPRAM 5 MILLIGRAM(S): 10 TABLET, FILM COATED ORAL at 11:26

## 2018-07-24 RX ADMIN — Medication 100 MILLIGRAM(S): at 06:30

## 2018-07-24 RX ADMIN — Medication 10 MILLIGRAM(S): at 06:29

## 2018-07-24 NOTE — PROGRESS NOTE ADULT - PROBLEM SELECTOR PROBLEM 3
Hypothyroidism
Type 2 diabetes mellitus without complication, without long-term current use of insulin

## 2018-07-24 NOTE — PROGRESS NOTE ADULT - ASSESSMENT
78 yo F from home has HHA , ROBBI X1 (oriented to person ), bed bound at baseline with chronic mcfadden with PMH of MS, HTN, HLD , DM, MS, CVA with left hemiparesis brought in by HHA to ED c/o 2 months of ''brown watery stools" that is not getting better and now for the past 2 days has developed a fever, generalized weakness, and rash. Patient visited PMD and was started on Levaquin for 2 day around the same time the rash developed. Patient appears to be at baseline, as per home health aid. Otherwise, no shortness of breath,, vomiting, new soaps, new lotions, new detergents, new pets, or abdominal pian .  Pt was intubated on Feb at [facility] for  Acute respiratory failure 2/2 to Influenza and Pneumonia. says is coughing with inability to bring up phlegm .     ED course, temp 97.8, , /79, RR 15 and SO2 97% on RA. Labs show WBC 17.7k with left shift, UA positive for UTI, cxr neg for consolidation  and CT a/p shows Rectal distention with liquid stool in keeping with the history of diarrhea. No bowel obstruction. Pt had no diarrhea since in ED    # likely constipation with overiding diarrhea . stool  gi pcr neg     # complicated uti . urine cx with e.fecalis , has indwelling mcfadden     plan  can change to oral ampicillin 500mg po bid for 5 days   laxatives as per pmd     stable for d/c from id pt of view   d/w np

## 2018-07-24 NOTE — PROGRESS NOTE ADULT - PROBLEM SELECTOR PLAN 4
Insulin lispro (HumaLOG) corrective regimen sliding scale   SubCutaneous Before meals and at bedtime
Carvedilol 6.25 milliGRAM(s) Oral every 12 hours

## 2018-07-24 NOTE — PROGRESS NOTE ADULT - PROBLEM SELECTOR PLAN 7
Enterococcus Facaelis  CefTRIAXone   IVPB 1 Gram(s) IV Intermittent every 24 hours  Urine culture sensitivity noted; covert to PO Abx upon discharge

## 2018-07-24 NOTE — PROGRESS NOTE ADULT - PROBLEM SELECTOR PLAN 2
Losartan 50 milliGRAM(s) Oral daily  Carvedilol 6.25 milliGRAM(s) Oral every 12 hours
Noninfectious  Consider GI consult ?IBD

## 2018-07-25 ENCOUNTER — TRANSCRIPTION ENCOUNTER (OUTPATIENT)
Age: 78
End: 2018-07-25

## 2018-07-25 LAB
GLUCOSE BLDC GLUCOMTR-MCNC: 119 MG/DL — HIGH (ref 70–99)
GLUCOSE BLDC GLUCOMTR-MCNC: 136 MG/DL — HIGH (ref 70–99)
GLUCOSE BLDC GLUCOMTR-MCNC: 136 MG/DL — HIGH (ref 70–99)
GLUCOSE BLDC GLUCOMTR-MCNC: 249 MG/DL — HIGH (ref 70–99)

## 2018-07-25 RX ORDER — RALOXIFENE HYDROCHLORIDE 60 MG/1
1 TABLET, COATED ORAL
Qty: 0 | Refills: 0 | COMMUNITY

## 2018-07-25 RX ORDER — AMOXICILLIN 250 MG/5ML
1 SUSPENSION, RECONSTITUTED, ORAL (ML) ORAL
Qty: 9 | Refills: 0 | OUTPATIENT
Start: 2018-07-25 | End: 2018-07-29

## 2018-07-25 RX ORDER — LEVOTHYROXINE SODIUM 125 MCG
1 TABLET ORAL
Qty: 0 | Refills: 0 | DISCHARGE
Start: 2018-07-25

## 2018-07-25 RX ORDER — DONEPEZIL HYDROCHLORIDE 10 MG/1
1 TABLET, FILM COATED ORAL
Qty: 0 | Refills: 0 | DISCHARGE
Start: 2018-07-25

## 2018-07-25 RX ORDER — BACLOFEN 100 %
1 POWDER (GRAM) MISCELLANEOUS
Qty: 0 | Refills: 0 | DISCHARGE
Start: 2018-07-25

## 2018-07-25 RX ORDER — DONEPEZIL HYDROCHLORIDE 10 MG/1
10 TABLET, FILM COATED ORAL
Qty: 0 | Refills: 0 | COMMUNITY

## 2018-07-25 RX ORDER — ATORVASTATIN CALCIUM 80 MG/1
1 TABLET, FILM COATED ORAL
Qty: 0 | Refills: 0 | DISCHARGE
Start: 2018-07-25

## 2018-07-25 RX ORDER — HYDRALAZINE HCL 50 MG
25 TABLET ORAL ONCE
Qty: 0 | Refills: 0 | Status: COMPLETED | OUTPATIENT
Start: 2018-07-25 | End: 2018-07-25

## 2018-07-25 RX ORDER — LEVOTHYROXINE SODIUM 125 MCG
1 TABLET ORAL
Qty: 0 | Refills: 0 | COMMUNITY

## 2018-07-25 RX ORDER — CARVEDILOL PHOSPHATE 80 MG/1
1 CAPSULE, EXTENDED RELEASE ORAL
Qty: 0 | Refills: 0 | DISCHARGE
Start: 2018-07-25

## 2018-07-25 RX ORDER — HALOPERIDOL DECANOATE 100 MG/ML
0.5 INJECTION INTRAMUSCULAR
Qty: 0 | Refills: 0 | COMMUNITY

## 2018-07-25 RX ORDER — MEMANTINE HYDROCHLORIDE 10 MG/1
14 TABLET ORAL
Qty: 0 | Refills: 0 | COMMUNITY

## 2018-07-25 RX ORDER — RALOXIFENE HYDROCHLORIDE 60 MG/1
1 TABLET, COATED ORAL
Qty: 0 | Refills: 0 | DISCHARGE
Start: 2018-07-25

## 2018-07-25 RX ORDER — BACLOFEN 100 %
1 POWDER (GRAM) MISCELLANEOUS
Qty: 0 | Refills: 0 | COMMUNITY

## 2018-07-25 RX ORDER — ASPIRIN/CALCIUM CARB/MAGNESIUM 324 MG
1 TABLET ORAL
Qty: 0 | Refills: 0 | DISCHARGE
Start: 2018-07-25

## 2018-07-25 RX ORDER — LOSARTAN POTASSIUM 100 MG/1
1 TABLET, FILM COATED ORAL
Qty: 0 | Refills: 0 | DISCHARGE
Start: 2018-07-25

## 2018-07-25 RX ORDER — MEMANTINE HYDROCHLORIDE 10 MG/1
1 TABLET ORAL
Qty: 0 | Refills: 0 | DISCHARGE
Start: 2018-07-25

## 2018-07-25 RX ORDER — CITALOPRAM 10 MG/1
5 TABLET, FILM COATED ORAL
Qty: 0 | Refills: 0 | COMMUNITY

## 2018-07-25 RX ORDER — HALOPERIDOL DECANOATE 100 MG/ML
1 INJECTION INTRAMUSCULAR
Qty: 0 | Refills: 0 | DISCHARGE
Start: 2018-07-25

## 2018-07-25 RX ORDER — LOSARTAN POTASSIUM 100 MG/1
1 TABLET, FILM COATED ORAL
Qty: 0 | Refills: 0 | COMMUNITY

## 2018-07-25 RX ADMIN — Medication 1 APPLICATION(S): at 13:42

## 2018-07-25 RX ADMIN — Medication 10 MILLIGRAM(S): at 13:42

## 2018-07-25 RX ADMIN — HEPARIN SODIUM 5000 UNIT(S): 5000 INJECTION INTRAVENOUS; SUBCUTANEOUS at 23:14

## 2018-07-25 RX ADMIN — Medication 1 APPLICATION(S): at 07:12

## 2018-07-25 RX ADMIN — Medication 81 MILLIGRAM(S): at 12:06

## 2018-07-25 RX ADMIN — Medication 2: at 12:06

## 2018-07-25 RX ADMIN — HEPARIN SODIUM 5000 UNIT(S): 5000 INJECTION INTRAVENOUS; SUBCUTANEOUS at 13:42

## 2018-07-25 RX ADMIN — LOSARTAN POTASSIUM 50 MILLIGRAM(S): 100 TABLET, FILM COATED ORAL at 07:13

## 2018-07-25 RX ADMIN — Medication 500 MILLIGRAM(S): at 17:15

## 2018-07-25 RX ADMIN — CARVEDILOL PHOSPHATE 6.25 MILLIGRAM(S): 80 CAPSULE, EXTENDED RELEASE ORAL at 07:13

## 2018-07-25 RX ADMIN — Medication 25 MICROGRAM(S): at 07:13

## 2018-07-25 RX ADMIN — Medication 500 MILLIGRAM(S): at 07:13

## 2018-07-25 RX ADMIN — DONEPEZIL HYDROCHLORIDE 10 MILLIGRAM(S): 10 TABLET, FILM COATED ORAL at 23:14

## 2018-07-25 RX ADMIN — Medication 10 MILLIGRAM(S): at 07:13

## 2018-07-25 RX ADMIN — Medication 25 MILLIGRAM(S): at 23:13

## 2018-07-25 RX ADMIN — Medication 1 APPLICATION(S): at 23:12

## 2018-07-25 RX ADMIN — MEMANTINE HYDROCHLORIDE 10 MILLIGRAM(S): 10 TABLET ORAL at 12:06

## 2018-07-25 RX ADMIN — HEPARIN SODIUM 5000 UNIT(S): 5000 INJECTION INTRAVENOUS; SUBCUTANEOUS at 07:13

## 2018-07-25 RX ADMIN — LORATADINE 10 MILLIGRAM(S): 10 TABLET ORAL at 12:06

## 2018-07-25 RX ADMIN — Medication 10 MILLIGRAM(S): at 23:13

## 2018-07-25 RX ADMIN — CARVEDILOL PHOSPHATE 6.25 MILLIGRAM(S): 80 CAPSULE, EXTENDED RELEASE ORAL at 17:15

## 2018-07-25 RX ADMIN — CITALOPRAM 5 MILLIGRAM(S): 10 TABLET, FILM COATED ORAL at 12:06

## 2018-07-25 RX ADMIN — ATORVASTATIN CALCIUM 40 MILLIGRAM(S): 80 TABLET, FILM COATED ORAL at 23:13

## 2018-07-25 NOTE — DISCHARGE NOTE ADULT - HOSPITAL COURSE
78 yo female from home has HHA , AAO X1 (oriented to person ),  bed bound at baseline with chronic mcfadden with PMH of MS, HTN, HLD , DM, MS, CVA with left hemiparesis brought in by HHA presented with diarrhea x 2 months.  Seen by PMD given Levaquin with generalized rash developing.    In ED VS: T 97.8   /79 RR 15 PO 97% RA.  Examination revealed nonpruritic scattered erythematous, blanching, not raised, not target lesion, not vesicle, throughout the body sparing the palms and soles.  WBC 17.7 with left shif.  CT a/p shows Rectal distention with liquid stool in keeping with the history of diarrhea. No bowel obstruction.  UA +.  Ceftriaxone 1 gram IV initiated daily while awating urine culture.      UTI with chronic mcfadden  Urine culture grew 50-99K Enterococcus Faecalis  Dr. Grewal ID  Ceftriaxone 1 gm daily IV  Upon discharge Amoxicillin 500 mg Q12 x 5 days (Dose 2 1800 hours)    Dermatitis  Resolved without intervention     Diarrhea, unspecified type  Noninfectious  Stool OP negative detection  Cdiff was not detected  Consider GI consult ?IBD.     Type 2 diabetes mellitus without complication, without long-term current use of   Consider Humalog 5 mg AC HS if spike persists.   Resume Glipizide 5 mg QD upon discharge to home    Essential hypertension  Carvedilol 6.25 milliGRAM(s) Oral every 12 hours.     MS (multiple sclerosis)  Baclofen 10 milliGRAM(s) Oral three times a day.     CVA  ASA 81 mg daily  Fall precautions.    Hypothyroidism  Levothyroxine 25 MICROGram(s) Oral daily.     Hyperlipidemia   Trig 202 HDL 36 (<100)   Aspirin  chewable 81 milliGRAM(s) Oral daily  Atorvastatin 40 milliGRAM(s) Oral at bedtime.     Depression  Resumed upon discharge Celexa 5 mg PO QD    Need for prophylactic measure  IMPROVE VTE Individual Risk Assessment Score 3  Heparin 5000 units Q8    Discharge planning issues  Discharge to home with HHA.

## 2018-07-25 NOTE — DISCHARGE NOTE ADULT - PROVIDER TOKENS
TOKMAKAYLA:'[id number]:MIIS:[id number]' TOKEN:'[id number]:MIIS:2149',FREE:[LAST:[Faustino],FIRST:[Yordan],PHONE:[phone]],FAX:[(   )    -]]

## 2018-07-25 NOTE — DISCHARGE NOTE ADULT - PATIENT PORTAL LINK FT
You can access the [facility], offered by [facility], by registering with the following website: [email or web address]

## 2018-07-25 NOTE — DISCHARGE NOTE ADULT - CARE PROVIDER_API CALL
Scott Sharif), Internal Medicine  [address]  [place] [address]  Phone: [phone]  Fax: [phone] Scott Sharif), Internal Medicine  [address]  [address]  [place] [address]  Phone: [phone]  Fax: [phone]    Yordan Harmon  Phone: [phone]  Fax: (   )    -

## 2018-07-25 NOTE — DISCHARGE NOTE ADULT - SECONDARY DIAGNOSIS.
Urinary tract infection associated with indwelling urethral catheter, initial encounter Rash Essential hypertension Type 2 diabetes mellitus without complication, without long-term current use of insulin Hyperlipidemia CVA (cerebral vascular accident)

## 2018-07-25 NOTE — DISCHARGE NOTE ADULT - PLAN OF CARE
Resolved Continue routine stool softners.  Encoparesis considered  Maintain fluid hydration and increase fiber in diet Resolution Amoxicillin 500 mg Q12 x 5 days  [place] catheter care Allergy to Levaquin considered Controlled Low salt diet A1C 7 Continue Carbohydrate consistent diet LDL @ least 100 to reduce cardiovascular risk Low cholesterol diet Adequate cardiovascular profusion & Safety Continue ASA  Fall precaution

## 2018-07-25 NOTE — DISCHARGE NOTE ADULT - MEDICATION SUMMARY - MEDICATIONS TO TAKE
I will START or STAY ON the medications listed below when I get home from the hospital:    aspirin 81 mg oral tablet, chewable  -- 1 tab(s) by mouth once a day  -- Indication: For CVA (cerebral vascular accident)    losartan 50 mg oral tablet  -- 1 tab(s) by mouth once a day  -- Indication: For Hypertension    glipiZIDE 5 mg oral tablet  -- 1 tab(s) by mouth once a day  -- Indication: For Diabetes mellitus    atorvastatin 40 mg oral tablet  -- 1 tab(s) by mouth once a day (at bedtime)  -- Indication: For Hyperlipidemia    Evista 60 mg oral tablet  -- 1 tab(s) by mouth once a day  -- Indication: For Osteoporosis    haloperidol 0.5 mg oral tablet  -- 1 tab(s) by mouth once a day  -- Indication: For Dementia    carvedilol 6.25 mg oral tablet  -- 1 tab(s) by mouth every 12 hours  -- Indication: For Hypertension    donepezil 10 mg oral tablet  -- 1 tab(s) by mouth once a day (at bedtime)  -- Indication: For Dementia    memantine 10 mg oral tablet  -- 1 tab(s) by mouth once a day  -- Indication: For Dementia    baclofen 10 mg oral tablet  -- 1 tab(s) by mouth 3 times a day  -- Indication: For CVA (cerebral vascular accident)    amoxicillin 500 mg oral capsule  -- 1 cap(s) by mouth 2 times a day  -- Indication: For UTI (urinary tract infection)    levothyroxine 25 mcg (0.025 mg) oral tablet  -- 1 tab(s) by mouth once a day  -- Indication: For Hypothyroidism I will START or STAY ON the medications listed below when I get home from the hospital:    aspirin 81 mg oral tablet, chewable  -- 1 tab(s) by mouth once a day  -- Indication: For CVA (cerebral vascular accident)    losartan 50 mg oral tablet  -- 1 tab(s) by mouth once a day  -- Indication: For Hypertension    citalopram  -- 5 milligram(s) by mouth once a day  -- Indication: For Depression    glipiZIDE 5 mg oral tablet  -- 1 tab(s) by mouth once a day  -- Indication: For Diabetes mellitus    atorvastatin 40 mg oral tablet  -- 1 tab(s) by mouth once a day (at bedtime)  -- Indication: For Hyperlipidemia    Evista 60 mg oral tablet  -- 1 tab(s) by mouth once a day  -- Indication: For Osteoporosis    haloperidol 0.5 mg oral tablet  -- 1 tab(s) by mouth once a day  -- Indication: For Dementia    carvedilol 6.25 mg oral tablet  -- 1 tab(s) by mouth every 12 hours  -- Indication: For Hypertension    donepezil 10 mg oral tablet  -- 1 tab(s) by mouth once a day (at bedtime)  -- Indication: For Dementia    memantine 10 mg oral tablet  -- 1 tab(s) by mouth once a day  -- Indication: For Dementia    baclofen 10 mg oral tablet  -- 1 tab(s) by mouth 3 times a day  -- Indication: For CVA (cerebral vascular accident)    amoxicillin 500 mg oral capsule  -- 1 cap(s) by mouth 2 times a day  -- Indication: For UTI (urinary tract infection)    levothyroxine 25 mcg (0.025 mg) oral tablet  -- 1 tab(s) by mouth once a day  -- Indication: For Hypothyroidism

## 2018-07-25 NOTE — DISCHARGE NOTE ADULT - CARE PLAN
Principal Discharge DX:	Diarrhea, unspecified type  Goal:	Resolved  Assessment and plan of treatment:	Continue routine stool softners.  Encoparesis considered  Maintain fluid hydration and increase fiber in diet  Secondary Diagnosis:	Urinary tract infection associated with indwelling urethral catheter, initial encounter  Goal:	Resolution  Assessment and plan of treatment:	Amoxicillin 500 mg Q12 x 5 days  [place] catheter care  Secondary Diagnosis:	Rash  Goal:	Resolved  Assessment and plan of treatment:	Allergy to Levaquin considered  Secondary Diagnosis:	Essential hypertension  Goal:	Controlled  Assessment and plan of treatment:	Low salt diet  Secondary Diagnosis:	Type 2 diabetes mellitus without complication, without long-term current use of insulin  Goal:	A1C 7  Assessment and plan of treatment:	Continue Carbohydrate consistent diet  Secondary Diagnosis:	Hyperlipidemia  Goal:	LDL @ least 100 to reduce cardiovascular risk  Assessment and plan of treatment:	Low cholesterol diet  Secondary Diagnosis:	CVA (cerebral vascular accident)  Goal:	Adequate cardiovascular profusion & Safety  Assessment and plan of treatment:	Continue ASA  Fall precaution

## 2018-07-26 VITALS
SYSTOLIC BLOOD PRESSURE: 147 MMHG | HEART RATE: 72 BPM | DIASTOLIC BLOOD PRESSURE: 42 MMHG | TEMPERATURE: 98 F | RESPIRATION RATE: 16 BRPM | OXYGEN SATURATION: 100 %

## 2018-07-26 LAB
CULTURE RESULTS: SIGNIFICANT CHANGE UP
CULTURE RESULTS: SIGNIFICANT CHANGE UP
GLUCOSE BLDC GLUCOMTR-MCNC: 142 MG/DL — HIGH (ref 70–99)
GLUCOSE BLDC GLUCOMTR-MCNC: 226 MG/DL — HIGH (ref 70–99)
SPECIMEN SOURCE: SIGNIFICANT CHANGE UP
SPECIMEN SOURCE: SIGNIFICANT CHANGE UP

## 2018-07-26 PROCEDURE — 80061 LIPID PANEL: CPT

## 2018-07-26 PROCEDURE — 87507 IADNA-DNA/RNA PROBE TQ 12-25: CPT

## 2018-07-26 PROCEDURE — 83036 HEMOGLOBIN GLYCOSYLATED A1C: CPT

## 2018-07-26 PROCEDURE — 83605 ASSAY OF LACTIC ACID: CPT

## 2018-07-26 PROCEDURE — 82607 VITAMIN B-12: CPT

## 2018-07-26 PROCEDURE — 99285 EMERGENCY DEPT VISIT HI MDM: CPT | Mod: 25

## 2018-07-26 PROCEDURE — 85652 RBC SED RATE AUTOMATED: CPT

## 2018-07-26 PROCEDURE — 83690 ASSAY OF LIPASE: CPT

## 2018-07-26 PROCEDURE — 87086 URINE CULTURE/COLONY COUNT: CPT

## 2018-07-26 PROCEDURE — 85027 COMPLETE CBC AUTOMATED: CPT

## 2018-07-26 PROCEDURE — 82550 ASSAY OF CK (CPK): CPT

## 2018-07-26 PROCEDURE — 84100 ASSAY OF PHOSPHORUS: CPT

## 2018-07-26 PROCEDURE — 87045 FECES CULTURE AEROBIC BACT: CPT

## 2018-07-26 PROCEDURE — 82746 ASSAY OF FOLIC ACID SERUM: CPT

## 2018-07-26 PROCEDURE — 83735 ASSAY OF MAGNESIUM: CPT

## 2018-07-26 PROCEDURE — 80048 BASIC METABOLIC PNL TOTAL CA: CPT

## 2018-07-26 PROCEDURE — 82962 GLUCOSE BLOOD TEST: CPT

## 2018-07-26 PROCEDURE — 80053 COMPREHEN METABOLIC PANEL: CPT

## 2018-07-26 PROCEDURE — 74176 CT ABD & PELVIS W/O CONTRAST: CPT

## 2018-07-26 PROCEDURE — 84443 ASSAY THYROID STIM HORMONE: CPT

## 2018-07-26 PROCEDURE — 87040 BLOOD CULTURE FOR BACTERIA: CPT

## 2018-07-26 PROCEDURE — 71045 X-RAY EXAM CHEST 1 VIEW: CPT

## 2018-07-26 PROCEDURE — 87493 C DIFF AMPLIFIED PROBE: CPT

## 2018-07-26 PROCEDURE — 87186 SC STD MICRODIL/AGAR DIL: CPT

## 2018-07-26 PROCEDURE — 84484 ASSAY OF TROPONIN QUANT: CPT

## 2018-07-26 PROCEDURE — 81001 URINALYSIS AUTO W/SCOPE: CPT

## 2018-07-26 PROCEDURE — 87046 STOOL CULTR AEROBIC BACT EA: CPT

## 2018-07-26 RX ORDER — AMOXICILLIN 250 MG/5ML
1 SUSPENSION, RECONSTITUTED, ORAL (ML) ORAL
Qty: 60 | Refills: 0
Start: 2018-07-26 | End: 2018-08-24

## 2018-07-26 RX ORDER — CITALOPRAM 10 MG/1
5 TABLET, FILM COATED ORAL
Qty: 0 | Refills: 0 | DISCHARGE
Start: 2018-07-26

## 2018-07-26 RX ORDER — HYDRALAZINE HCL 50 MG
25 TABLET ORAL EVERY 8 HOURS
Qty: 0 | Refills: 0 | Status: DISCONTINUED | OUTPATIENT
Start: 2018-07-26 | End: 2018-07-26

## 2018-07-26 RX ADMIN — LORATADINE 10 MILLIGRAM(S): 10 TABLET ORAL at 11:52

## 2018-07-26 RX ADMIN — HEPARIN SODIUM 5000 UNIT(S): 5000 INJECTION INTRAVENOUS; SUBCUTANEOUS at 07:02

## 2018-07-26 RX ADMIN — Medication 25 MICROGRAM(S): at 07:02

## 2018-07-26 RX ADMIN — Medication 2: at 11:59

## 2018-07-26 RX ADMIN — Medication 1 APPLICATION(S): at 07:02

## 2018-07-26 RX ADMIN — Medication 10 MILLIGRAM(S): at 07:02

## 2018-07-26 RX ADMIN — CARVEDILOL PHOSPHATE 6.25 MILLIGRAM(S): 80 CAPSULE, EXTENDED RELEASE ORAL at 07:02

## 2018-07-26 RX ADMIN — CITALOPRAM 5 MILLIGRAM(S): 10 TABLET, FILM COATED ORAL at 11:51

## 2018-07-26 RX ADMIN — LOSARTAN POTASSIUM 50 MILLIGRAM(S): 100 TABLET, FILM COATED ORAL at 07:03

## 2018-07-26 RX ADMIN — MEMANTINE HYDROCHLORIDE 10 MILLIGRAM(S): 10 TABLET ORAL at 11:51

## 2018-07-26 RX ADMIN — Medication 500 MILLIGRAM(S): at 07:02

## 2018-07-26 RX ADMIN — Medication 81 MILLIGRAM(S): at 11:51

## 2018-07-26 NOTE — PROGRESS NOTE ADULT - ASSESSMENT
78 yo F from home has HHA , ROBBI X1 (oriented to person ), bed bound at baseline with chronic mcfadden with PMH of MS, HTN, HLD , DM, MS, CVA with left hemiparesis brought in by HHA to ED c/o 2 months of ''brown watery stools" that is not getting better and now for the past 2 days has developed a fever, generalized weakness, and rash. Patient visited PMD and was started on Levaquin for 2 day around the same time the rash developed. Patient appears to be at baseline, as per home health aid. Otherwise, no shortness of breath,, vomiting, new soaps, new lotions, new detergents, new pets, or abdominal pian .  Pt was intubated on Feb at [facility] for  Acute respiratory failure 2/2 to Influenza and Pneumonia. says is coughing with inability to bring up phlegm .     ED course, temp 97.8, , /79, RR 15 and SO2 97% on RA. Labs show WBC 17.7k with left shift, UA positive for UTI, cxr neg for consolidation  and CT a/p shows Rectal distention with liquid stool in keeping with the history of diarrhea. No bowel obstruction. Pt had no diarrhea since in ED    # likely constipation with overiding diarrhea . stool  gi pcr neg     # complicated uti . urine cx with e.fecalis , has indwelling mcfadden     plan   oral amoxicillin 500mg po bid for 5 days   laxatives as per pmd     stable for d/c from id pt of view   d/w np

## 2018-07-26 NOTE — PROGRESS NOTE ADULT - SUBJECTIVE AND OBJECTIVE BOX
NP Note discussed with  Primary Attending    Patient is a 77y old  Female who presents with a chief complaint of fever and rash (21 Jul 2018 01:07)    INTERVAL HPI/OVERNIGHT EVENTS: An episode of soft BM overnight.  Urine culture sensitivity noted.  Denied awareness of urinary symptoms.    MEDICATIONS  (STANDING):  aspirin  chewable 81 milliGRAM(s) Oral daily  atorvastatin 40 milliGRAM(s) Oral at bedtime  baclofen 10 milliGRAM(s) Oral three times a day  carvedilol 6.25 milliGRAM(s) Oral every 12 hours  cefTRIAXone   IVPB 1 Gram(s) IV Intermittent every 24 hours  citalopram 5 milliGRAM(s) Oral daily  dextrose 5%. 1000 milliLiter(s) (50 mL/Hr) IV Continuous <Continuous>  dextrose 50% Injectable 12.5 Gram(s) IV Push once  dextrose 50% Injectable 25 Gram(s) IV Push once  dextrose 50% Injectable 25 Gram(s) IV Push once  donepezil 10 milliGRAM(s) Oral at bedtime  heparin  Injectable 5000 Unit(s) SubCutaneous every 8 hours  hydrocortisone 0.5% Cream 1 Application(s) Topical three times a day  insulin lispro (HumaLOG) corrective regimen sliding scale   SubCutaneous Before meals and at bedtime  levothyroxine 25 MICROGram(s) Oral daily  loratadine 10 milliGRAM(s) Oral daily  losartan 50 milliGRAM(s) Oral daily  memantine 10 milliGRAM(s) Oral daily  metroNIDAZOLE  IVPB 500 milliGRAM(s) IV Intermittent every 8 hours    MEDICATIONS  (PRN):  dextrose 40% Gel 15 Gram(s) Oral once PRN Blood Glucose LESS THAN 70 milliGRAM(s)/deciLiter  glucagon  Injectable 1 milliGRAM(s) IntraMuscular once PRN Glucose <70 milliGRAM(s)/deciLiter    __________________________________________________  REVIEW OF SYSTEMS:    CONSTITUTIONAL: No fever,   EYES: no acute visual disturbances  NECK: No pain or stiffness  RESPIRATORY: No cough; No shortness of breath  CARDIOVASCULAR: No chest pain, no palpitations  GASTROINTESTINAL: No pain. No nausea or vomiting; No diarrhea   NEUROLOGICAL: No headache or numbness, no tremors  MUSCULOSKELETAL: No joint pain, no muscle pain  GENITOURINARY: no dysuria, no frequency, no hesitancy  PSYCHIATRY: no depression , no anxiety  ALL OTHER  ROS negative        Vital Signs Last 24 Hrs  T(C): 37.2 (24 Jul 2018 14:32), Max: 37.2 (24 Jul 2018 14:32)  T(F): 99 (24 Jul 2018 14:32), Max: 99 (24 Jul 2018 14:32)  HR: 78 (24 Jul 2018 14:32) (78 - 89)  BP: 165/68 (24 Jul 2018 14:32) (134/53 - 165/68)  BP(mean): --  RR: 18 (24 Jul 2018 14:32) (14 - 18)  SpO2: 100% (24 Jul 2018 14:32) (97% - 100%)    ________________________________________________  PHYSICAL EXAM:  GENERAL: NAD  HEENT: Normocephalic;  conjunctivae and sclerae clear; moist mucous membranes;   NECK : supple  CHEST/LUNG: Clear to auscultation bilaterally with good air entry   HEART: S1 S2  regular; no murmurs, gallops or rubs  ABDOMEN: Soft, Nontender, Nondistended; Bowel sounds present  EXTREMITIES: no cyanosis; no edema; no calf tenderness  SKIN: warm and dry; no rash  NERVOUS SYSTEM:  Awake and alert; Oriented  to place, person and time ; no new deficits    _________________________________________________  LABS:                        10.0   10.3  )-----------( 181      ( 23 Jul 2018 06:59 )             32.5     07-24    145  |  116<H>  |  36<H>  ----------------------------<  129<H>  3.9   |  22  |  1.76<H>    Ca    8.2<L>      24 Jul 2018 06:48  Phos  2.5     07-24  Mg     2.1     07-24    CAPILLARY BLOOD GLUCOSE  POCT Blood Glucose.: 259 mg/dL (24 Jul 2018 12:23)  POCT Blood Glucose.: 131 mg/dL (24 Jul 2018 07:36)  POCT Blood Glucose.: 159 mg/dL (23 Jul 2018 22:43)  POCT Blood Glucose.: 118 mg/dL (23 Jul 2018 17:58)    Plan of care was discussed with patient and /or primary care giver; all questions and concerns were addressed and care was aligned with patient's wishes.
INTERVAL HPI/OVERNIGHT EVENTS:  no acute events ,no acute dystress    VITAL SIGNS:  T(F): 97.4 (18 @ 06:24)  HR: 80 (18 @ 06:24)  BP: 122/60 (18 @ 06:24)  RR: 16 (18 @ 06:24)  SpO2: 98% (18 @ 06:24)  Wt(kg): --    PHYSICAL EXAM:awake    Constitutional:  Eyes:  ENMT:perrla  Neck:  Respiratory:clear  Cardiovascular:s1 s2,m-none  Gastrointestinal:soft,mcfadden in place  Extremities:  Vascular:  Neurological:no focal deficit  Musculoskeletal:    MEDICATIONS  (STANDING):  aspirin  chewable 81 milliGRAM(s) Oral daily  atorvastatin 40 milliGRAM(s) Oral at bedtime  baclofen 10 milliGRAM(s) Oral three times a day  carvedilol 6.25 milliGRAM(s) Oral every 12 hours  cefTRIAXone   IVPB 1 Gram(s) IV Intermittent every 24 hours  citalopram 5 milliGRAM(s) Oral daily  dextrose 5%. 1000 milliLiter(s) (50 mL/Hr) IV Continuous <Continuous>  dextrose 50% Injectable 12.5 Gram(s) IV Push once  dextrose 50% Injectable 25 Gram(s) IV Push once  dextrose 50% Injectable 25 Gram(s) IV Push once  donepezil 10 milliGRAM(s) Oral at bedtime  heparin  Injectable 5000 Unit(s) SubCutaneous every 8 hours  hydrocortisone 0.5% Cream 1 Application(s) Topical three times a day  insulin lispro (HumaLOG) corrective regimen sliding scale   SubCutaneous Before meals and at bedtime  levothyroxine 25 MICROGram(s) Oral daily  loratadine 10 milliGRAM(s) Oral daily  losartan 50 milliGRAM(s) Oral daily  memantine 10 milliGRAM(s) Oral daily  metroNIDAZOLE  IVPB 500 milliGRAM(s) IV Intermittent every 8 hours    MEDICATIONS  (PRN):  dextrose 40% Gel 15 Gram(s) Oral once PRN Blood Glucose LESS THAN 70 milliGRAM(s)/deciLiter  glucagon  Injectable 1 milliGRAM(s) IntraMuscular once PRN Glucose <70 milliGRAM(s)/deciLiter      Allergies    Allergy Status Unknown    Intolerances        LABS:                        9.7    9.8   )-----------( 170      ( 2018 07:50 )             30.4     07-    144  |  112<H>  |  38<H>  ----------------------------<  102<H>  3.1<L>   |  21<L>  |  1.77<H>    Ca    8.2<L>      2018 07:50    TPro  7.1  /  Alb  3.3<L>  /  TBili  0.3  /  DBili  x   /  AST  14  /  ALT  15  /  AlkPhos  118  07-20      Urinalysis Basic - ( 2018 22:50 )    Color: Yellow / Appearance: Clear / S.015 / pH: x  Gluc: x / Ketone: Negative  / Bili: Negative / Urobili: Negative   Blood: x / Protein: 30 mg/dL / Nitrite: Negative   Leuk Esterase: Moderate / RBC: 5-10 /HPF / WBC 11-25 /HPF   Sq Epi: x / Non Sq Epi: Moderate /HPF / Bacteria: Moderate /HPF      Assessment and Plan:    Assessment:  · Assessment		  76 yo F from home has ANDER , ROBBI X1 (oriented to person ),  bed bound at baseline with chronic mcfadden with PMH of MS, HTN, HLD , DM, MS, CVA with left hemiparesis brought in by ANDER to ED c/o 2 months of watery like loose stools that is not getting better and now for the past 2 days has developed a fever, generalized weakness, and rash. Patient visited PMD and was started on Levaquin for 2 day around the same time the rash developed.  Labs show WBC 17.7k with left shift, UA positive for UTI and CT a/p shows Rectal distention with liquid stool in keeping with the history of diarrhea. No bowel obstruction.     Problem/Plan - 1:  ·  Problem: Rash.  Plan: Scattered erythematous, blanching, not raised, not target lesion, not vesicle, throughout the body sparing the palms and soles  likely allergic to levaquin    on claritin and hydrocortisone cream.      Problem/Plan - 2:  ·  Problem: UTI (urinary tract infection).  Plan: UA positive for mcfadden related UTI   on rocephin  f/u urine culture  ID Dr. Grewal.     Problem/Plan - 3:  ·  Problem: Diarrhea.  Plan: as per HCP, pt had watery  stool for 2 mons  likely enterocolitis , r/o c. diff  started on rocephin and flagyl  f/u stool culture and GI pcr  f/u c. diff   ID Dr. Grewal.     Problem/Plan - 4:  ·  Problem: Hypertension.  Plan: c/w losartan and coreg  monitor BP closely.      Problem/Plan - 5:  ·  Problem: Diabetes mellitus.  Plan: on glipizide 5mg daily at home  started hiss  f/u A1C.      Problem/Plan - 6:  Problem: CVA (cerebral vascular accident). Plan: hx of CVA with left hemiparesis   C/w ASA, Statin.     Problem/Plan - 7:  ·  Problem: MS (multiple sclerosis).  Plan: C/w home baclofen, Citalopram and donepezil.      Problem/Plan - 8:  ·  Problem: Need for prophylactic measure.  Plan: IMPROVE VTE Individual Risk Assessment    RISK                                                          Points  [] Previous VTE                                           3  [] Thrombophilia                                        2  [] Lower limb paralysis                              2   [] Current Cancer                                       2   [x] Immobilization > 24 hrs                        1  [] ICU/CCU stay > 24 hours                       1  [x] Age > 60                                                   1
NP Note discussed with  Primary Attending    Patient is a 77y old  Female who presents with a chief complaint of fever and rash (21 Jul 2018 01:07)    INTERVAL HPI/OVERNIGHT EVENTS: Patient states she was doing well.  Loose bowel movement X 1.  Denied taking laxatives @ home.  Cdiff, Stool for OP negative.  Offered no new complaints.  Rash resolved.  HHA @ bedside she never complains.  Customarily takes Glipizide @ home.  A1C 7.    MEDICATIONS  (STANDING):  aspirin  chewable 81 milliGRAM(s) Oral daily  atorvastatin 40 milliGRAM(s) Oral at bedtime  baclofen 10 milliGRAM(s) Oral three times a day  carvedilol 6.25 milliGRAM(s) Oral every 12 hours  cefTRIAXone   IVPB 1 Gram(s) IV Intermittent every 24 hours  citalopram 5 milliGRAM(s) Oral daily  dextrose 5%. 1000 milliLiter(s) (50 mL/Hr) IV Continuous <Continuous>  dextrose 50% Injectable 12.5 Gram(s) IV Push once  dextrose 50% Injectable 25 Gram(s) IV Push once  dextrose 50% Injectable 25 Gram(s) IV Push once  donepezil 10 milliGRAM(s) Oral at bedtime  heparin  Injectable 5000 Unit(s) SubCutaneous every 8 hours  hydrocortisone 0.5% Cream 1 Application(s) Topical three times a day  insulin lispro (HumaLOG) corrective regimen sliding scale   SubCutaneous Before meals and at bedtime  levothyroxine 25 MICROGram(s) Oral daily  loratadine 10 milliGRAM(s) Oral daily  losartan 50 milliGRAM(s) Oral daily  memantine 10 milliGRAM(s) Oral daily  metroNIDAZOLE  IVPB 500 milliGRAM(s) IV Intermittent every 8 hours  potassium chloride   Powder 40 milliEquivalent(s) Oral once    MEDICATIONS  (PRN):  dextrose 40% Gel 15 Gram(s) Oral once PRN Blood Glucose LESS THAN 70 milliGRAM(s)/deciLiter  glucagon  Injectable 1 milliGRAM(s) IntraMuscular once PRN Glucose <70 milliGRAM(s)/deciLiter  __________________________________________________  REVIEW OF SYSTEMS:    CONSTITUTIONAL: No fever,   EYES: no acute visual disturbances  NECK: No pain or stiffness  RESPIRATORY: No cough; No shortness of breath  CARDIOVASCULAR: No chest pain, no palpitations  GASTROINTESTINAL: Diarrhea   NEUROLOGICAL: No headache or numbness, no tremors  MUSCULOSKELETAL: No joint pain, no muscle pain  GENITOURINARY: no dysuria, no frequency, no hesitancy  PSYCHIATRY: no depression , no anxiety  ALL OTHER  ROS negative      Vital Signs Last 24 Hrs  T(C): 37.1 (23 Jul 2018 14:30), Max: 37.1 (23 Jul 2018 14:30)  T(F): 98.7 (23 Jul 2018 14:30), Max: 98.7 (23 Jul 2018 14:30)  HR: 87 (23 Jul 2018 14:30) (79 - 87)  BP: 121/55 (23 Jul 2018 14:30) (102/40 - 131/47)  BP(mean): --  RR: 18 (23 Jul 2018 14:30) (16 - 18)  SpO2: 100% (23 Jul 2018 14:30) (98% - 100%)    ________________________________________________  PHYSICAL EXAM:  GENERAL: NAD  AAOx2  HEENT: Normocephalic;  conjunctivae and sclerae clear; moist mucous membranes;   NECK : supple  CHEST/LUNG: Clear to auscultation bilaterally with good air entry   HEART: S1 S2  regular; no murmurs, gallops or rubs  ABDOMEN: Soft, Nontender, Nondistended; Bowel sounds present  EXTREMITIES: no cyanosis; no edema; no calf tenderness  SKIN: warm and dry; no rash  NERVOUS SYSTEM:  Awake and alert; Oriented  to place, person and time ; no new deficits    _________________________________________________  LABS:                        10.0   10.3  )-----------( 181      ( 23 Jul 2018 06:59 )             32.5     07-23    145  |  113<H>  |  41<H>  ----------------------------<  107<H>  3.4<L>   |  21<L>  |  1.92<H>    Ca    8.1<L>      23 Jul 2018 06:59    CAPILLARY BLOOD GLUCOSE  POCT Blood Glucose.: 291 mg/dL (23 Jul 2018 12:06)  POCT Blood Glucose.: 121 mg/dL (23 Jul 2018 07:39)  POCT Blood Glucose.: 177 mg/dL (22 Jul 2018 21:28)    RADIOLOGY & ADDITIONAL TESTS:    Imaging Personally Reviewed:  YES    Consultant(s) Notes Reviewed:   YES    Care Discussed with Consultants :     Plan of care was discussed with patient and /or primary care giver; all questions and concerns were addressed and care was aligned with patient's wishes.
Subjective: more awake and alert , no diarrhea , no fevers       PHYSICAL EXAM:    Vital Signs Last 24 Hrs  T(C): 37.2 (24 Jul 2018 14:32), Max: 37.2 (24 Jul 2018 14:32)  T(F): 99 (24 Jul 2018 14:32), Max: 99 (24 Jul 2018 14:32)  HR: 78 (24 Jul 2018 14:32) (78 - 89)  BP: 165/68 (24 Jul 2018 14:32) (136/46 - 165/68)  BP(mean): --  RR: 18 (24 Jul 2018 14:32) (14 - 18)  SpO2: 100% (24 Jul 2018 14:32) (97% - 100%)    Constitutional: more awake and alert nad    EUSEBIA SCLERA anicteric EOMI   LUNGS clear   CVS s1 s2 aud no murmurs   ABDOMEN soft non tender no HSM   NEUROLOGY  no defecits   SKIN no rash   EXTREMITIES no edema no cyanosis   mcfadden in place        LABS/DIAGNOSTIC TESTS                        10.0   10.3  )-----------( 181      ( 23 Jul 2018 06:59 )             32.5     07-24    145  |  116<H>  |  36<H>  ----------------------------<  129<H>  3.9   |  22  |  1.76<H>    Ca    8.2<L>      24 Jul 2018 06:48  Phos  2.5     07-24  Mg     2.1     07-24            meds   aspirin  chewable 81 milliGRAM(s) Oral daily  atorvastatin 40 milliGRAM(s) Oral at bedtime  baclofen 10 milliGRAM(s) Oral three times a day  carvedilol 6.25 milliGRAM(s) Oral every 12 hours  cefTRIAXone   IVPB 1 Gram(s) IV Intermittent every 24 hours  citalopram 5 milliGRAM(s) Oral daily  dextrose 40% Gel 15 Gram(s) Oral once PRN  dextrose 5%. 1000 milliLiter(s) IV Continuous <Continuous>  dextrose 50% Injectable 12.5 Gram(s) IV Push once  dextrose 50% Injectable 25 Gram(s) IV Push once  dextrose 50% Injectable 25 Gram(s) IV Push once  donepezil 10 milliGRAM(s) Oral at bedtime  glucagon  Injectable 1 milliGRAM(s) IntraMuscular once PRN  heparin  Injectable 5000 Unit(s) SubCutaneous every 8 hours  hydrocortisone 0.5% Cream 1 Application(s) Topical three times a day  insulin lispro (HumaLOG) corrective regimen sliding scale   SubCutaneous Before meals and at bedtime  levothyroxine 25 MICROGram(s) Oral daily  loratadine 10 milliGRAM(s) Oral daily  losartan 50 milliGRAM(s) Oral daily  memantine 10 milliGRAM(s) Oral daily  metroNIDAZOLE  IVPB 500 milliGRAM(s) IV Intermittent every 8 hours        CULTURES  Culture Results: stool   GI PCR Results: NOT detected  *******Please Note:*******  GI panel PCR evaluates for:  Campylobacter, Plesiomonas shigelloides, Salmonella,  Vibrio, Yersinia enterocolitica, Enteroaggregative  Escherichia coli (EAEC), Enteropathogenic E.coli (EPEC),  Enterotoxigenic E. coli (ETEC) lt/st, Shiga-like  toxin-producing E. coli (STEC) stx1/stx2,  Shigella/ Enteroinvasive E. coli (EIEC), Cryptosporidium,  Cyclospora cayetanensis, Entamoeba histolytica,  Giardia lamblia, Adenovirus F 40/41, Astrovirus,  Norovirus GI/GII, Rotavirus A, Sapovirus (07-22 @ 11:47)  Culture Results:   No enteric gram negative rods isolated  No enteric pathogens isolated.  (Stool culture examined for Salmonella,  Shigella, Campylobacter, Aeromonas, Plesiomonas,  Vibrio, E.coli O157 and Yersinia) (07-22 @ 11:46)  Culture Results: blood  No growth to date. (07-21 @ 10:37)  Culture Results: blood  No growth to date. (07-21 @ 10:37)  Culture Results: urine   50,000 - 99,000 CFU/mL Enterococcus faecalis  <10,000 CFU/ml Normal Urogenital paulie present (07-21 @ 09:51)        RADIOLOGY  no new xrays
Subjective: no new complaints. no diarrhea , occ cough       PHYSICAL EXAM:    Vital Signs Last 24 Hrs  T(C): 36.6 (26 Jul 2018 14:46), Max: 36.9 (25 Jul 2018 20:35)  T(F): 97.8 (26 Jul 2018 14:46), Max: 98.5 (25 Jul 2018 20:35)  HR: 72 (26 Jul 2018 14:46) (71 - 76)  BP: 147/42 (26 Jul 2018 14:46) (130/50 - 181/73)  BP(mean): --  RR: 16 (26 Jul 2018 14:46) (14 - 16)  SpO2: 100% (26 Jul 2018 14:46) (97% - 100%)      Constitutional: more awake and alert nad    EUSEBIA SCLERA anicteric EOMI   LUNGS clear   CVS s1 s2 aud no murmurs   ABDOMEN soft non tender no HSM   NEUROLOGY  no defecits   SKIN no rash   EXTREMITIES no edema no cyanosis   mcfadden in place      LABS/DIAGNOSTIC TESTS    no new labs             meds   amoxicillin      Capsule 500 milliGRAM(s) Oral two times a day  aspirin  chewable 81 milliGRAM(s) Oral daily  atorvastatin 40 milliGRAM(s) Oral at bedtime  baclofen 10 milliGRAM(s) Oral three times a day  carvedilol 6.25 milliGRAM(s) Oral every 12 hours  citalopram 5 milliGRAM(s) Oral daily  dextrose 40% Gel 15 Gram(s) Oral once PRN  dextrose 5%. 1000 milliLiter(s) IV Continuous <Continuous>  dextrose 50% Injectable 12.5 Gram(s) IV Push once  dextrose 50% Injectable 25 Gram(s) IV Push once  dextrose 50% Injectable 25 Gram(s) IV Push once  donepezil 10 milliGRAM(s) Oral at bedtime  glucagon  Injectable 1 milliGRAM(s) IntraMuscular once PRN  heparin  Injectable 5000 Unit(s) SubCutaneous every 8 hours  hydrocortisone 0.5% Cream 1 Application(s) Topical three times a day  insulin lispro (HumaLOG) corrective regimen sliding scale   SubCutaneous Before meals and at bedtime  levothyroxine 25 MICROGram(s) Oral daily  loratadine 10 milliGRAM(s) Oral daily  losartan 50 milliGRAM(s) Oral daily  memantine 10 milliGRAM(s) Oral daily        CULTURES  Culture Results:   GI PCR Results: NOT detected  *******Please Note:*******  GI panel PCR evaluates for:  Campylobacter, Plesiomonas shigelloides, Salmonella,  Vibrio, Yersinia enterocolitica, Enteroaggregative  Escherichia coli (EAEC), Enteropathogenic E.coli (EPEC),  Enterotoxigenic E. coli (ETEC) lt/st, Shiga-like  toxin-producing E. coli (STEC) stx1/stx2,  Shigella/ Enteroinvasive E. coli (EIEC), Cryptosporidium,  Cyclospora cayetanensis, Entamoeba histolytica,  Giardia lamblia, Adenovirus F 40/41, Astrovirus,  Norovirus GI/GII, Rotavirus A, Sapovirus (07-22 @ 11:47)  Culture Results:   No enteric gram negative rods isolated  No enteric pathogens isolated.  (Stool culture examined for Salmonella,  Shigella, Campylobacter, Aeromonas, Plesiomonas,  Vibrio, E.coli O157 and Yersinia) (07-22 @ 11:46)  Culture Results:   No growth at 5 days. (07-21 @ 10:37)  Culture Results:   No growth at 5 days. (07-21 @ 10:37)  Culture Results:   50,000 - 99,000 CFU/mL Enterococcus faecalis  <10,000 CFU/ml Normal Urogenital paulie present (07-21 @ 09:51)        RADIOLOGY  no new xrays

## 2018-10-02 ENCOUNTER — EMERGENCY (EMERGENCY)
Facility: HOSPITAL | Age: 78
LOS: 1 days | Discharge: HOME | End: 2018-10-02
Attending: EMERGENCY MEDICINE
Payer: MEDICARE

## 2018-10-02 VITALS
WEIGHT: 130.07 LBS | HEART RATE: 68 BPM | OXYGEN SATURATION: 95 % | SYSTOLIC BLOOD PRESSURE: 152 MMHG | RESPIRATION RATE: 18 BRPM | DIASTOLIC BLOOD PRESSURE: 70 MMHG | TEMPERATURE: 99 F

## 2018-10-02 PROCEDURE — 51703 INSERT BLADDER CATH COMPLEX: CPT

## 2018-10-02 PROCEDURE — 99283 EMERGENCY DEPT VISIT LOW MDM: CPT | Mod: 25

## 2018-10-02 NOTE — ED PROVIDER NOTE - MEDICAL DECISION MAKING DETAILS
76 y/o F pt presents with leaking of Macedo catheter. Will replace with larger catheter and d/c home.

## 2018-10-02 NOTE — ED PROVIDER NOTE - OBJECTIVE STATEMENT
78 y/o F pt with a significant PMHx of DM, hypothyroidism, CVA, HTN, MS and no significant PSHx presents to the ED from home for leaking Macedo cathter. Today, pt's visiting nurse came and replaced pt's Macedo. Since pt's replacement, urine has been leaking. Pt usually has a size 20 fringe cathter, but a 16 fringe was placed by visiting nurse today. Pt denies fever, chills, hematuria or any other complaints. NKDA.

## 2018-10-03 VITALS
SYSTOLIC BLOOD PRESSURE: 120 MMHG | TEMPERATURE: 98 F | DIASTOLIC BLOOD PRESSURE: 70 MMHG | RESPIRATION RATE: 16 BRPM | OXYGEN SATURATION: 98 % | HEART RATE: 70 BPM

## 2018-10-03 NOTE — ED ADULT NURSE NOTE - NSIMPLEMENTINTERV_GEN_ALL_ED
Implemented All Universal Safety Interventions:  [place] to call system. Call bell, personal items and telephone within reach. Instruct patient to call for assistance. Room bathroom lighting operational. Non-slip footwear when patient is off stretcher. Physically safe environment: no spills, clutter or unnecessary equipment. Stretcher in lowest position, wheels locked, appropriate side rails in place.

## 2018-10-03 NOTE — ED ADULT NURSE NOTE - PAIN: PRESENCE, MLM
The ddimer is high. Book an ultrasound of the legs bilaterally of the veins today.  Orders were put in.
The platelets are stable.   The prostate and thyroid are OK.    The lipid and a1c are stable.  Recheck the a1c in 6 months and the other labs in 1 year.  The BNP is a little high and he has edema.  He needs to be screened for congestive heart failure.   Book an ECHO of the heart with doppler for edema and increased bnp.  Orders placed.
non-verbal indicator of pain/discomfort not present

## 2018-10-03 NOTE — ED ADULT NURSE NOTE - OBJECTIVE STATEMENT
brought in due to mcfadden catheter leaking , needs to be changed as per HHA. Seen and examined by Dr Gonzalez, mcfadden changed to Fr #20 /30cc ml inflated.

## 2018-12-21 NOTE — ED ADULT NURSE NOTE - MUSCULOSKELETAL WDL
hair removal not indicated Full range of motion of upper and lower extremities, no joint tenderness/swelling.

## 2019-07-05 ENCOUNTER — EMERGENCY (EMERGENCY)
Facility: HOSPITAL | Age: 79
LOS: 1 days | Discharge: HOME | End: 2019-07-05
Attending: EMERGENCY MEDICINE
Payer: MEDICARE

## 2019-07-05 VITALS
TEMPERATURE: 97 F | RESPIRATION RATE: 18 BRPM | DIASTOLIC BLOOD PRESSURE: 75 MMHG | HEART RATE: 75 BPM | OXYGEN SATURATION: 98 % | SYSTOLIC BLOOD PRESSURE: 178 MMHG

## 2019-07-05 PROCEDURE — 99283 EMERGENCY DEPT VISIT LOW MDM: CPT | Mod: 25

## 2019-07-05 PROCEDURE — 99283 EMERGENCY DEPT VISIT LOW MDM: CPT

## 2019-07-05 PROCEDURE — 51702 INSERT TEMP BLADDER CATH: CPT

## 2019-07-05 NOTE — ED PROVIDER NOTE - OBJECTIVE STATEMENT
77 y/o F with a significant PMHx of CVA, DM, HTN, hypothyroidism and MS presents to the ED for Macedo catheter change. Patient states nurse was unable to change it, prompting their visit to the ED.

## 2019-07-05 NOTE — ED ADULT NURSE NOTE - OBJECTIVE STATEMENT
78yr old f biba c/o pain , , swelling vaginal area  after the  nurse took the mcfadden  catheter out today

## 2019-07-05 NOTE — ED ADULT NURSE NOTE - NSIMPLEMENTINTERV_GEN_ALL_ED
Implemented All Fall Risk Interventions:  [place] to call system. Call bell, personal items and telephone within reach. Instruct patient to call for assistance. Room bathroom lighting operational. Non-slip footwear when patient is off stretcher. Physically safe environment: no spills, clutter or unnecessary equipment. Stretcher in lowest position, wheels locked, appropriate side rails in place. Provide visual cue, wrist band, yellow gown, etc. Monitor gait and stability. Monitor for mental status changes and reorient to person, place, and time. Review medications for side effects contributing to fall risk. Reinforce activity limits and safety measures with patient and family.

## 2019-12-19 ENCOUNTER — EMERGENCY (EMERGENCY)
Facility: HOSPITAL | Age: 79
LOS: 1 days | Discharge: HOME | End: 2019-12-19
Attending: EMERGENCY MEDICINE
Payer: MEDICARE

## 2019-12-19 VITALS
DIASTOLIC BLOOD PRESSURE: 60 MMHG | HEART RATE: 98 BPM | RESPIRATION RATE: 18 BRPM | TEMPERATURE: 99 F | SYSTOLIC BLOOD PRESSURE: 145 MMHG | OXYGEN SATURATION: 96 %

## 2019-12-19 VITALS
WEIGHT: 149.91 LBS | HEIGHT: 61 IN | RESPIRATION RATE: 20 BRPM | OXYGEN SATURATION: 95 % | SYSTOLIC BLOOD PRESSURE: 156 MMHG | DIASTOLIC BLOOD PRESSURE: 75 MMHG | HEART RATE: 107 BPM | TEMPERATURE: 99 F

## 2019-12-19 LAB
APPEARANCE UR: CLEAR — SIGNIFICANT CHANGE UP
BACTERIA # UR AUTO: ABNORMAL /HPF
BILIRUB UR-MCNC: NEGATIVE — SIGNIFICANT CHANGE UP
COLOR SPEC: YELLOW — SIGNIFICANT CHANGE UP
COMMENT - URINE: SIGNIFICANT CHANGE UP
DIFF PNL FLD: ABNORMAL
EPI CELLS # UR: ABNORMAL /HPF
GLUCOSE UR QL: NEGATIVE — SIGNIFICANT CHANGE UP
KETONES UR-MCNC: NEGATIVE — SIGNIFICANT CHANGE UP
LEUKOCYTE ESTERASE UR-ACNC: ABNORMAL
NITRITE UR-MCNC: NEGATIVE — SIGNIFICANT CHANGE UP
PH UR: 5 — SIGNIFICANT CHANGE UP (ref 5–8)
PROT UR-MCNC: 30 MG/DL
RBC CASTS # UR COMP ASSIST: ABNORMAL /HPF (ref 0–2)
SP GR SPEC: 1.01 — SIGNIFICANT CHANGE UP (ref 1.01–1.02)
UROBILINOGEN FLD QL: NEGATIVE — SIGNIFICANT CHANGE UP
WBC UR QL: ABNORMAL /HPF (ref 0–5)

## 2019-12-19 PROCEDURE — 99283 EMERGENCY DEPT VISIT LOW MDM: CPT | Mod: 25

## 2019-12-19 PROCEDURE — 99283 EMERGENCY DEPT VISIT LOW MDM: CPT

## 2019-12-19 PROCEDURE — 87186 SC STD MICRODIL/AGAR DIL: CPT

## 2019-12-19 PROCEDURE — 51702 INSERT TEMP BLADDER CATH: CPT

## 2019-12-19 PROCEDURE — 87086 URINE CULTURE/COLONY COUNT: CPT

## 2019-12-19 PROCEDURE — 81001 URINALYSIS AUTO W/SCOPE: CPT

## 2019-12-19 NOTE — ED ADULT TRIAGE NOTE - CHIEF COMPLAINT QUOTE
Urine  leaking around catheter. Only 25 mls urine in bag since catheter changed x 12 MD by visiting nurse.

## 2019-12-19 NOTE — ED ADULT NURSE REASSESSMENT NOTE - NS ED NURSE REASSESS COMMENT FT1
PT p/w 20Fr. indwelling catheter, not working properly. Mcfadden removed, and replaced with new 20Fr mcfadden. Urine flowing freely, No leaks noted.

## 2019-12-19 NOTE — ED PROVIDER NOTE - PATIENT PORTAL LINK FT
You can access the FollowMyHealth Patient Portal offered by [facility] by registering at the following website: [email or web address]. By joining [employer]’s FollowMyHealth portal, you will also be able to view your health information using other applications (apps) compatible with our system.

## 2019-12-19 NOTE — ED PROVIDER NOTE - OBJECTIVE STATEMENT
78 year old female with PMHx of HTN, hypothyroid, and DM presenting to the ER with need for Macdeo catheter change. Patient was seen by a visiting home nurse who noted that today there was a decreased urine output. Nurse is unsure if the Macedo catheter was placed correctly and states that the it is changed every week. Patient currently denies pain, shortness of breath, or any other acute complaints. 78 year old female with PMHx of MS, HTN, hypothyroid, and DM presenting to the ER with need for decreased outpt from Mcfadden catheter. Patient was seen by a visiting home nurse and new mcfadden was placed but with no outpt since this AM.  Unsure if placed properly. Denies fevers, nausea, emesis, chest pain, shortness of breath, abdominal pain, dysuria, hematuria, diarrhea, constipation, or any other acute complaints.

## 2019-12-19 NOTE — ED ADULT NURSE NOTE - CHPI ED NUR SYMPTOMS NEG
no fever/no diarrhea/no dysuria/no hematuria/no abdominal distension/no blood in stool/no nausea/no chills

## 2019-12-19 NOTE — ED PROVIDER NOTE - QUALITY
incorrect placement of Macedo catheter (Home visiting nurse says "decreased urine output" but unsure if the Macedo is placed correctly)

## 2019-12-19 NOTE — ED ADULT NURSE NOTE - OBJECTIVE STATEMENT
PT aox3, BIBA from home, bedbound, HHA at bed side, c/o leaking chronic mcfadden. Pt states her indwelling catheter was changed today at 1200 by VNS, and it has been leaking since, with only 25ml on bag. Pt denies pain, CP SOB, hematuria, dysuria.

## 2019-12-19 NOTE — ED PROVIDER NOTE - NSFOLLOWUPINSTRUCTIONS_ED_ALL_ED_FT
You had your mcfadden catheter replaced. Please follow up with your primary care doctor. Please return to the Emergency Department for worsening signs or symptoms.

## 2019-12-19 NOTE — ED ADULT NURSE NOTE - NSIMPLEMENTINTERV_GEN_ALL_ED
Implemented All Fall with Harm Risk Interventions:  [place] to call system. Call bell, personal items and telephone within reach. Instruct patient to call for assistance. Room bathroom lighting operational. Non-slip footwear when patient is off stretcher. Physically safe environment: no spills, clutter or unnecessary equipment. Stretcher in lowest position, wheels locked, appropriate side rails in place. Provide visual cue, wrist band, yellow gown, etc. Monitor gait and stability. Monitor for mental status changes and reorient to person, place, and time. Review medications for side effects contributing to fall risk. Reinforce activity limits and safety measures with patient and family. Provide visual clues: red socks.

## 2019-12-19 NOTE — ED PROVIDER NOTE - CLINICAL SUMMARY MEDICAL DECISION MAKING FREE TEXT BOX
77 yo F with mcfadden catheter malfunction. Decreased outpt since mcfadden catheter change in AM. Mcfadden replaced by nursing staff with good outpt. To follow up with PMD. Nontoxic and medically stable for discharge. Return precautions provided and patient understands to return to the ED for worsening signs and symptoms. Instructed to follow up with primary care physician/specialist and agreeable. Patient's questions answered and given copies of lab results.

## 2019-12-19 NOTE — ED PROVIDER NOTE - NSFOLLOWUPCLINICS_GEN_ALL_ED_FT
[facility]  Multi Specialty Office  [address] - [address] [place] [address]  Phone: [phone]  Fax: [phone]  Follow Up Time:     [facility]  Urology  [address] [place] [address]  Phone: [phone]  Fax: [phone]  Follow Up Time:

## 2020-02-14 ENCOUNTER — EMERGENCY (EMERGENCY)
Facility: HOSPITAL | Age: 80
LOS: 1 days | Discharge: HOME | End: 2020-02-14
Attending: EMERGENCY MEDICINE
Payer: MEDICARE

## 2020-02-14 VITALS
WEIGHT: 139.99 LBS | HEIGHT: 65 IN | RESPIRATION RATE: 16 BRPM | SYSTOLIC BLOOD PRESSURE: 145 MMHG | DIASTOLIC BLOOD PRESSURE: 62 MMHG | OXYGEN SATURATION: 97 % | TEMPERATURE: 98 F | HEART RATE: 63 BPM

## 2020-02-14 PROCEDURE — 99283 EMERGENCY DEPT VISIT LOW MDM: CPT

## 2020-02-14 NOTE — ED PROVIDER NOTE - NSFOLLOWUPINSTRUCTIONS_ED_ALL_ED_FT
SANDERS CATHETER PLACEMENT AND CARE - General Information     Sanders Catheter Placement and Care    WHAT YOU NEED TO KNOW:    What is a Sanders catheter? A Sanders catheter is a sterile tube that is inserted into your bladder to drain urine. It is also called an indwelling urinary catheter. The tip of the catheter has a small balloon filled with solution that holds the catheter in your bladder.               How is a Sanders catheter placed?     Your healthcare provider will clean your genital area with a sterile solution. He or she will put lubricant jelly on the catheter to help it go in smoothly. The end of the catheter with the deflated balloon will be inserted into your urethra. The catheter will be moved slowly and gently into your bladder. You may be asked to take slow, deep breaths or to push as if you were trying to urinate as the catheter is inserted.      When your healthcare provider sees urine flowing from the catheter, he or she will fill the balloon at the end of the catheter. The balloon holds the catheter in place so it does not come out. Your healthcare provider will attach the open end of the catheter to a sterile drainage bag.    How do I care for my Sanders catheter?     Clean your genital area 2 times every day. Clean your catheter and the area around where it was inserted. Use soap and water. Clean your anal opening and catheter area after every bowel movement.       Secure the catheter tube so you do not pull or move the catheter. This helps prevent pain and bladder spasms. Healthcare providers will show you how to use medical tape or a strap to secure the catheter tube to your body.       Keep a closed drainage system. Your Sanders catheter should always be attached to the drainage bag to form a closed system. Do not disconnect any part of the closed system unless you need to change the bag.    How do I care for my drainage bag?     Ask if a leg bag is right for you. A leg bag can be worn under your clothes. Ask your healthcare provider for more information about a leg bag.       Keep the drainage bag below the level of your waist. This helps stop urine from moving back up the tubing and into your bladder. Do not loop or kink the tubing. This can cause urine to back up and collect in your bladder. Do not let the drainage bag touch or lie on the floor.      Empty the drainage bag when needed. The weight of a full drainage bag can be painful. Empty the drainage bag every 3 to 6 hours or when it is ? full.       Clean and change the drainage bag as directed. Ask your healthcare provider how often you should change the drainage bag and what cleaning solution to use. Wear disposable gloves when you change the bag. Do not allow the end of the catheter or tubing to touch anything. Clean the ends with an alcohol pad before you reconnect them.    What can I do if problems develop?    No urine is draining into the bag:   Check for kinks in the tubing and straighten them out.       Check the tape or strap used to secure the catheter tube to your skin. Make sure it is not blocking the tube.       Make sure you are not sitting or lying on the tubing.      Make sure the urine bag is hanging below the level of your waist.      Urine leaks from or around the catheter, tubing, or drainage bag: Check if the closed drainage system has accidently come open or apart. Clean the catheter and tubing ends with a new alcohol pad and reconnect them.     What are the risks of a Sanders catheter? You may develop an infection caused by bacteria. This can happen when the drainage system is opened and bacteria get inside the tubing. You can also get an infection if the catheter equipment is not cleaned well or you do not wash your hands. The infection can spread to your bladder or other organs, and may become severe.    How can I help prevent an infection?     Wash your hands often. Wash before and after you touch your catheter, tubing, or drainage bag. Use soap and water. Wear clean disposable gloves when you care for your catheter or disconnect the drainage bag. Wash your hands before you prepare or eat food. Handwashing           Drink liquids as directed. Ask your healthcare provider how much liquid to drink each day and which liquids are best for you. Liquids will help flush your kidneys and bladder to help prevent infection.    When should I seek immediate care?     Your catheter comes out.       You suddenly have material that looks like sand in the tubing or drainage bag.      No urine is draining into the bag and you have checked the system.      You have pain in your hip, back, pelvis, or lower abdomen.      You are confused or cannot think clearly.    When should I call my doctor?     You have a fever.      You have bladder spasms for more than 1 day after the catheter is placed.      You see blood in the tubing or drainage bag.      You have a rash or itching where the catheter tube is secured to your skin.      Urine leaks from or around the catheter, tubing, or drainage bag.      The closed drainage system has accidently come open or apart.       You see a layer of crystals inside the tubing.      You have questions or concerns about your condition or care.    CARE AGREEMENT:    You have the right to help plan your care. Learn about your health condition and how it may be treated. Discuss treatment options with your healthcare providers to decide what care you want to receive. You always have the right to refuse treatment.        © Copyright [employer] 2020       back to top                      © Copyright [employer] 2020

## 2020-02-14 NOTE — ED PROVIDER NOTE - OBJECTIVE STATEMENT
78 yo F pmh of dementia, MS, CVA, chronic Macedo cath (changed today at home), HTN, bedbound, presents from home with friend who states that pt had routine Macedo cath change today and no UOP since then. Pt denies acute complaints.

## 2020-02-14 NOTE — ED PROVIDER NOTE - PHYSICAL EXAMINATION
GENERAL: well appearing, no acute distress   HEAD: atraumatic   EYES: EOMI, pink conjunctiva   ENT: moist oral mucosa   CARDIAC: RRR, no edema, distal pulses present   RESPIRATORY: lungs CTAB, no increased work of breathing   GASTROINTESTINAL: no abdominal tenderness, no rebound or guarding, bowel sounds presents  GENITOURINARY: no CVA tenderness; Macedo cath in place with less than 20 cc's of clear urine in bag, no blood   MUSCULOSKELETAL: no deformity   NEUROLOGICAL: alert, spontaneous movement of extremities   SKIN: intact   PSYCHIATRIC: cooperative  HEME LYMPH: no lymphadenopathy

## 2020-02-14 NOTE — ED PROVIDER NOTE - PATIENT PORTAL LINK FT
You can access the FollowMyHealth Patient Portal offered by [facility] by registering at the following website: [email or web address]. By joining [employer]’s FollowMyHealth portal, you will also be able to view your health information using other applications (apps) compatible with our system.

## 2020-02-14 NOTE — ED PROVIDER NOTE - PROGRESS NOTE DETAILS
labs - pre renal, Cr wnl  New mcfadden cath placed and RN noted significant drainage of urine onto bed, so previous Mcfadden was likely not in place. Pt still asymptomatic. New mcfadden drainage appropriately. Will aliza. Discussed indications for patient return to ED. Patient's caregiver understood.

## 2020-02-15 VITALS
RESPIRATION RATE: 17 BRPM | DIASTOLIC BLOOD PRESSURE: 61 MMHG | SYSTOLIC BLOOD PRESSURE: 128 MMHG | HEART RATE: 74 BPM | TEMPERATURE: 98 F | OXYGEN SATURATION: 97 %

## 2020-02-15 LAB
ALBUMIN SERPL ELPH-MCNC: 3.4 G/DL — LOW (ref 3.5–5)
ALP SERPL-CCNC: 105 U/L — SIGNIFICANT CHANGE UP (ref 40–120)
ALT FLD-CCNC: 15 U/L DA — SIGNIFICANT CHANGE UP (ref 10–60)
ANION GAP SERPL CALC-SCNC: 6 MMOL/L — SIGNIFICANT CHANGE UP (ref 5–17)
AST SERPL-CCNC: 20 U/L — SIGNIFICANT CHANGE UP (ref 10–40)
BILIRUB SERPL-MCNC: 0.3 MG/DL — SIGNIFICANT CHANGE UP (ref 0.2–1.2)
BUN SERPL-MCNC: 32 MG/DL — HIGH (ref 7–18)
CALCIUM SERPL-MCNC: 8.4 MG/DL — SIGNIFICANT CHANGE UP (ref 8.4–10.5)
CHLORIDE SERPL-SCNC: 106 MMOL/L — SIGNIFICANT CHANGE UP (ref 96–108)
CO2 SERPL-SCNC: 23 MMOL/L — SIGNIFICANT CHANGE UP (ref 22–31)
CREAT SERPL-MCNC: 1.28 MG/DL — SIGNIFICANT CHANGE UP (ref 0.5–1.3)
GLUCOSE SERPL-MCNC: 93 MG/DL — SIGNIFICANT CHANGE UP (ref 70–99)
HCT VFR BLD CALC: 37.1 % — SIGNIFICANT CHANGE UP (ref 34.5–45)
HGB BLD-MCNC: 12 G/DL — SIGNIFICANT CHANGE UP (ref 11.5–15.5)
MCHC RBC-ENTMCNC: 27.8 PG — SIGNIFICANT CHANGE UP (ref 27–34)
MCHC RBC-ENTMCNC: 32.3 GM/DL — SIGNIFICANT CHANGE UP (ref 32–36)
MCV RBC AUTO: 85.9 FL — SIGNIFICANT CHANGE UP (ref 80–100)
NRBC # BLD: 0 /100 WBCS — SIGNIFICANT CHANGE UP (ref 0–0)
PLATELET # BLD AUTO: 285 K/UL — SIGNIFICANT CHANGE UP (ref 150–400)
POTASSIUM SERPL-MCNC: 4.7 MMOL/L — SIGNIFICANT CHANGE UP (ref 3.5–5.3)
POTASSIUM SERPL-SCNC: 4.7 MMOL/L — SIGNIFICANT CHANGE UP (ref 3.5–5.3)
PROT SERPL-MCNC: 6.8 G/DL — SIGNIFICANT CHANGE UP (ref 6–8.3)
RBC # BLD: 4.32 M/UL — SIGNIFICANT CHANGE UP (ref 3.8–5.2)
RBC # FLD: 13.2 % — SIGNIFICANT CHANGE UP (ref 10.3–14.5)
SODIUM SERPL-SCNC: 135 MMOL/L — SIGNIFICANT CHANGE UP (ref 135–145)
WBC # BLD: 11.77 K/UL — HIGH (ref 3.8–10.5)
WBC # FLD AUTO: 11.77 K/UL — HIGH (ref 3.8–10.5)

## 2020-02-15 PROCEDURE — 99284 EMERGENCY DEPT VISIT MOD MDM: CPT | Mod: 25

## 2020-02-15 PROCEDURE — 80053 COMPREHEN METABOLIC PANEL: CPT

## 2020-02-15 PROCEDURE — 51702 INSERT TEMP BLADDER CATH: CPT

## 2020-02-15 PROCEDURE — 36415 COLL VENOUS BLD VENIPUNCTURE: CPT

## 2020-02-15 PROCEDURE — 85027 COMPLETE CBC AUTOMATED: CPT

## 2020-02-15 RX ORDER — SODIUM CHLORIDE 9 MG/ML
1000 INJECTION INTRAMUSCULAR; INTRAVENOUS; SUBCUTANEOUS ONCE
Refills: 0 | Status: COMPLETED | OUTPATIENT
Start: 2020-02-15 | End: 2020-02-15

## 2020-02-15 RX ADMIN — SODIUM CHLORIDE 1000 MILLILITER(S): 9 INJECTION INTRAMUSCULAR; INTRAVENOUS; SUBCUTANEOUS at 01:44

## 2020-02-15 NOTE — ED ADULT NURSE REASSESSMENT NOTE - NS ED NURSE REASSESS COMMENT FT1
mcfadden cath inserted as per MD, well tolerated by Pt, 75 cc drained output. pt's purple pad is drenched from urine caused by the replaced mcfadden cath.

## 2020-02-15 NOTE — ED ADULT NURSE NOTE - OBJECTIVE STATEMENT
BIB friend from home due to decreased output after visiting RN inserted mcfadden cath. Pt Has hx of alzheimer's, appears confused but cooperative. leaking is noticed on the mcfadden site, no noted redness but labia majora is swollen. minimal output is noted, clear and yellow. BIB friend from home due to decreased output after visiting RN inserted mcfadden cath. Pt Has hx of alzheimer's, appears confused but cooperative. leaking is noticed on the urethra  no noted redness or s/sx of infection on the perenium. minimal output is noted, clear and yellow.

## 2020-03-04 ENCOUNTER — EMERGENCY (EMERGENCY)
Facility: HOSPITAL | Age: 80
LOS: 1 days | Discharge: HOME | End: 2020-03-04
Attending: EMERGENCY MEDICINE
Payer: MEDICARE

## 2020-03-04 VITALS
RESPIRATION RATE: 18 BRPM | OXYGEN SATURATION: 96 % | WEIGHT: 149.91 LBS | DIASTOLIC BLOOD PRESSURE: 70 MMHG | HEART RATE: 65 BPM | TEMPERATURE: 98 F | SYSTOLIC BLOOD PRESSURE: 129 MMHG

## 2020-03-04 VITALS
RESPIRATION RATE: 18 BRPM | OXYGEN SATURATION: 97 % | DIASTOLIC BLOOD PRESSURE: 62 MMHG | TEMPERATURE: 98 F | HEART RATE: 69 BPM | SYSTOLIC BLOOD PRESSURE: 131 MMHG

## 2020-03-04 PROCEDURE — 99283 EMERGENCY DEPT VISIT LOW MDM: CPT

## 2020-03-04 PROCEDURE — 51702 INSERT TEMP BLADDER CATH: CPT

## 2020-03-04 PROCEDURE — 99283 EMERGENCY DEPT VISIT LOW MDM: CPT | Mod: 25

## 2020-03-04 NOTE — ED ADULT TRIAGE NOTE - CHIEF COMPLAINT QUOTE
pt send from home by Visiting Nurse for placing urinary mcfadden Catheter .unable to change the mcfadden catheter

## 2020-03-04 NOTE — ED PROVIDER NOTE - OBJECTIVE STATEMENT
78 y/o female with PMHx of DM, HTN, multiple sclerosis, hypothyroidism, CVA, and chronic indwelling Macedo catheter presents brought to the ED from home with HAA due to difficulty with a routine Macedo catheter change. Pt denies any Sx or concerns otherwise including fever, chills, nausea, vomiting, and abd pain.

## 2020-03-04 NOTE — ED PROVIDER NOTE - NSTIMEPROVIDERCAREINITIATE_GEN_ER
Aleja  Patient states started her new medication and ended up in the [facility] emergency room.  Patient states they were very concerned about her blood pressure.   Patient is wondering about taking her other medications.  Patient would like to speak to nurse.   Patient aware doctor is out.    04-Mar-2020 15:12

## 2020-03-04 NOTE — ED PROVIDER NOTE - CLINICAL SUMMARY MEDICAL DECISION MAKING FREE TEXT BOX
78 y/o female presents due to difficulty changing catheter. Routine Macedo Catheter changed successfully in ED. Will discharge home by ambulance with HAA with return precautions.

## 2020-03-04 NOTE — ED PROVIDER NOTE - PATIENT PORTAL LINK FT
You can access the FollowMyHealth Patient Portal offered by [facility] by registering at the following website: [email or web address]. By joining [employer]’s FollowMyHealth portal, you will also be able to view your health information using other applications (apps) compatible with our system.

## 2020-03-04 NOTE — ED ADULT NURSE NOTE - NSIMPLEMENTINTERV_GEN_ALL_ED
Implemented All Fall Risk Interventions:  [place] to call system. Call bell, personal items and telephone within reach. Instruct patient to call for assistance. Room bathroom lighting operational. Non-slip footwear when patient is off stretcher. Physically safe environment: no spills, clutter or unnecessary equipment. Stretcher in lowest position, wheels locked, appropriate side rails in place. Provide visual cue, wrist band, yellow gown, etc. Monitor gait and stability. Monitor for mental status changes and reorient to person, place, and time. Review medications for side effects contributing to fall risk. Reinforce activity limits and safety measures with patient and family.

## 2020-03-04 NOTE — ED ADULT NURSE NOTE - NSFALLRSKASSESSDT_ED_ALL_ED
Detailed message left for patient informing her of the below per Dr. Ana Cristina Oreilly. Advised patient to return call to clinic with any further questions or concerns. Nothing further. 04-Mar-2020 14:46

## 2020-07-16 NOTE — ED ADULT NURSE NOTE - NS ED NOTE ABUSE SUSPICION NEGLECT YN
Detail Level: Detailed Quality 130: Documentation Of Current Medications In The Medical Record: Current Medications Documented Additional Notes: patient is not in the age range for pneumonia vaccine and tobacco use 7/2020. No

## 2020-10-21 NOTE — DISCHARGE NOTE ADULT - VISION (WITH CORRECTIVE LENSES IF THE PATIENT USUALLY WEARS THEM):
Normal vision: sees adequately in most situations; can see medication labels, newsprint
21-Oct-2020 17:04

## 2021-08-07 ENCOUNTER — INPATIENT (INPATIENT)
Facility: HOSPITAL | Age: 81
LOS: 3 days | Discharge: HOSPICE | DRG: 100 | End: 2021-08-11
Attending: INTERNAL MEDICINE | Admitting: INTERNAL MEDICINE
Payer: MEDICARE

## 2021-08-07 VITALS
HEIGHT: 65 IN | RESPIRATION RATE: 24 BRPM | DIASTOLIC BLOOD PRESSURE: 71 MMHG | HEART RATE: 99 BPM | OXYGEN SATURATION: 100 % | TEMPERATURE: 98 F | SYSTOLIC BLOOD PRESSURE: 156 MMHG

## 2021-08-07 DIAGNOSIS — G93.40 ENCEPHALOPATHY, UNSPECIFIED: ICD-10-CM

## 2021-08-07 DIAGNOSIS — E03.9 HYPOTHYROIDISM, UNSPECIFIED: ICD-10-CM

## 2021-08-07 DIAGNOSIS — G35 MULTIPLE SCLEROSIS: ICD-10-CM

## 2021-08-07 DIAGNOSIS — R56.9 UNSPECIFIED CONVULSIONS: ICD-10-CM

## 2021-08-07 DIAGNOSIS — Z29.9 ENCOUNTER FOR PROPHYLACTIC MEASURES, UNSPECIFIED: ICD-10-CM

## 2021-08-07 DIAGNOSIS — N18.9 CHRONIC KIDNEY DISEASE, UNSPECIFIED: ICD-10-CM

## 2021-08-07 DIAGNOSIS — D64.9 ANEMIA, UNSPECIFIED: ICD-10-CM

## 2021-08-07 DIAGNOSIS — E11.9 TYPE 2 DIABETES MELLITUS WITHOUT COMPLICATIONS: ICD-10-CM

## 2021-08-07 DIAGNOSIS — I10 ESSENTIAL (PRIMARY) HYPERTENSION: ICD-10-CM

## 2021-08-07 LAB
ALBUMIN SERPL ELPH-MCNC: 2.6 G/DL — LOW (ref 3.5–5)
ALP SERPL-CCNC: 109 U/L — SIGNIFICANT CHANGE UP (ref 40–120)
ALT FLD-CCNC: 29 U/L DA — SIGNIFICANT CHANGE UP (ref 10–60)
ANION GAP SERPL CALC-SCNC: 6 MMOL/L — SIGNIFICANT CHANGE UP (ref 5–17)
APTT BLD: 35.4 SEC — SIGNIFICANT CHANGE UP (ref 27.5–35.5)
AST SERPL-CCNC: 17 U/L — SIGNIFICANT CHANGE UP (ref 10–40)
BASOPHILS # BLD AUTO: 0.02 K/UL — SIGNIFICANT CHANGE UP (ref 0–0.2)
BASOPHILS NFR BLD AUTO: 0.3 % — SIGNIFICANT CHANGE UP (ref 0–2)
BILIRUB SERPL-MCNC: 0.2 MG/DL — SIGNIFICANT CHANGE UP (ref 0.2–1.2)
BUN SERPL-MCNC: 36 MG/DL — HIGH (ref 7–18)
CALCIUM SERPL-MCNC: 8.5 MG/DL — SIGNIFICANT CHANGE UP (ref 8.4–10.5)
CHLORIDE SERPL-SCNC: 109 MMOL/L — HIGH (ref 96–108)
CO2 SERPL-SCNC: 25 MMOL/L — SIGNIFICANT CHANGE UP (ref 22–31)
CREAT SERPL-MCNC: 0.98 MG/DL — SIGNIFICANT CHANGE UP (ref 0.5–1.3)
EOSINOPHIL # BLD AUTO: 0.08 K/UL — SIGNIFICANT CHANGE UP (ref 0–0.5)
EOSINOPHIL NFR BLD AUTO: 1 % — SIGNIFICANT CHANGE UP (ref 0–6)
GLUCOSE SERPL-MCNC: 136 MG/DL — HIGH (ref 70–99)
HCT VFR BLD CALC: 25.5 % — LOW (ref 34.5–45)
HGB BLD-MCNC: 7.9 G/DL — LOW (ref 11.5–15.5)
IMM GRANULOCYTES NFR BLD AUTO: 0.4 % — SIGNIFICANT CHANGE UP (ref 0–1.5)
INR BLD: 0.97 RATIO — SIGNIFICANT CHANGE UP (ref 0.88–1.16)
LYMPHOCYTES # BLD AUTO: 1.24 K/UL — SIGNIFICANT CHANGE UP (ref 1–3.3)
LYMPHOCYTES # BLD AUTO: 16 % — SIGNIFICANT CHANGE UP (ref 13–44)
MCHC RBC-ENTMCNC: 26.6 PG — LOW (ref 27–34)
MCHC RBC-ENTMCNC: 31 GM/DL — LOW (ref 32–36)
MCV RBC AUTO: 85.9 FL — SIGNIFICANT CHANGE UP (ref 80–100)
MONOCYTES # BLD AUTO: 0.54 K/UL — SIGNIFICANT CHANGE UP (ref 0–0.9)
MONOCYTES NFR BLD AUTO: 7 % — SIGNIFICANT CHANGE UP (ref 2–14)
NEUTROPHILS # BLD AUTO: 5.83 K/UL — SIGNIFICANT CHANGE UP (ref 1.8–7.4)
NEUTROPHILS NFR BLD AUTO: 75.3 % — SIGNIFICANT CHANGE UP (ref 43–77)
NRBC # BLD: 0 /100 WBCS — SIGNIFICANT CHANGE UP (ref 0–0)
OB PNL STL: NEGATIVE — SIGNIFICANT CHANGE UP
PLATELET # BLD AUTO: 301 K/UL — SIGNIFICANT CHANGE UP (ref 150–400)
POTASSIUM SERPL-MCNC: 5 MMOL/L — SIGNIFICANT CHANGE UP (ref 3.5–5.3)
POTASSIUM SERPL-SCNC: 5 MMOL/L — SIGNIFICANT CHANGE UP (ref 3.5–5.3)
PROT SERPL-MCNC: 6.2 G/DL — SIGNIFICANT CHANGE UP (ref 6–8.3)
PROTHROM AB SERPL-ACNC: 11.6 SEC — SIGNIFICANT CHANGE UP (ref 10.6–13.6)
RBC # BLD: 2.97 M/UL — LOW (ref 3.8–5.2)
RBC # FLD: 15.9 % — HIGH (ref 10.3–14.5)
SARS-COV-2 RNA SPEC QL NAA+PROBE: SIGNIFICANT CHANGE UP
SODIUM SERPL-SCNC: 140 MMOL/L — SIGNIFICANT CHANGE UP (ref 135–145)
TROPONIN I SERPL-MCNC: <0.015 NG/ML — SIGNIFICANT CHANGE UP (ref 0–0.04)
WBC # BLD: 7.74 K/UL — SIGNIFICANT CHANGE UP (ref 3.8–10.5)
WBC # FLD AUTO: 7.74 K/UL — SIGNIFICANT CHANGE UP (ref 3.8–10.5)

## 2021-08-07 PROCEDURE — 93010 ELECTROCARDIOGRAM REPORT: CPT

## 2021-08-07 PROCEDURE — 0042T: CPT

## 2021-08-07 PROCEDURE — 71045 X-RAY EXAM CHEST 1 VIEW: CPT | Mod: 26

## 2021-08-07 PROCEDURE — 70496 CT ANGIOGRAPHY HEAD: CPT | Mod: 26,MA

## 2021-08-07 PROCEDURE — 70450 CT HEAD/BRAIN W/O DYE: CPT | Mod: 26,MA,59

## 2021-08-07 PROCEDURE — 70498 CT ANGIOGRAPHY NECK: CPT | Mod: 26,MA

## 2021-08-07 PROCEDURE — 99291 CRITICAL CARE FIRST HOUR: CPT | Mod: GC

## 2021-08-07 RX ORDER — CITALOPRAM 10 MG/1
1 TABLET, FILM COATED ORAL
Qty: 0 | Refills: 0 | DISCHARGE

## 2021-08-07 RX ORDER — RISPERIDONE 4 MG/1
1 TABLET ORAL
Qty: 0 | Refills: 0 | DISCHARGE

## 2021-08-07 RX ORDER — CITALOPRAM 10 MG/1
10 TABLET, FILM COATED ORAL DAILY
Refills: 0 | Status: DISCONTINUED | OUTPATIENT
Start: 2021-08-07 | End: 2021-08-11

## 2021-08-07 RX ORDER — SODIUM CHLORIDE 9 MG/ML
1000 INJECTION, SOLUTION INTRAVENOUS
Refills: 0 | Status: DISCONTINUED | OUTPATIENT
Start: 2021-08-07 | End: 2021-08-11

## 2021-08-07 RX ORDER — FOLIC ACID 0.8 MG
1 TABLET ORAL DAILY
Refills: 0 | Status: DISCONTINUED | OUTPATIENT
Start: 2021-08-07 | End: 2021-08-11

## 2021-08-07 RX ORDER — INSULIN LISPRO 100/ML
VIAL (ML) SUBCUTANEOUS EVERY 6 HOURS
Refills: 0 | Status: DISCONTINUED | OUTPATIENT
Start: 2021-08-07 | End: 2021-08-11

## 2021-08-07 RX ORDER — ENOXAPARIN SODIUM 100 MG/ML
40 INJECTION SUBCUTANEOUS DAILY
Refills: 0 | Status: DISCONTINUED | OUTPATIENT
Start: 2021-08-07 | End: 2021-08-11

## 2021-08-07 RX ORDER — RISPERIDONE 4 MG/1
0.5 TABLET ORAL
Refills: 0 | Status: DISCONTINUED | OUTPATIENT
Start: 2021-08-07 | End: 2021-08-11

## 2021-08-07 RX ORDER — LEVOTHYROXINE SODIUM 125 MCG
25 TABLET ORAL DAILY
Refills: 0 | Status: DISCONTINUED | OUTPATIENT
Start: 2021-08-07 | End: 2021-08-11

## 2021-08-07 RX ORDER — ASPIRIN/CALCIUM CARB/MAGNESIUM 324 MG
81 TABLET ORAL DAILY
Refills: 0 | Status: DISCONTINUED | OUTPATIENT
Start: 2021-08-07 | End: 2021-08-11

## 2021-08-07 RX ORDER — ATORVASTATIN CALCIUM 80 MG/1
40 TABLET, FILM COATED ORAL AT BEDTIME
Refills: 0 | Status: DISCONTINUED | OUTPATIENT
Start: 2021-08-07 | End: 2021-08-11

## 2021-08-07 RX ORDER — MIRTAZAPINE 45 MG/1
15 TABLET, ORALLY DISINTEGRATING ORAL AT BEDTIME
Refills: 0 | Status: DISCONTINUED | OUTPATIENT
Start: 2021-08-07 | End: 2021-08-11

## 2021-08-07 RX ORDER — MIRTAZAPINE 45 MG/1
1 TABLET, ORALLY DISINTEGRATING ORAL
Qty: 0 | Refills: 0 | DISCHARGE

## 2021-08-07 RX ADMIN — Medication 2 MILLIGRAM(S): at 14:59

## 2021-08-07 NOTE — H&P ADULT - HISTORY OF PRESENT ILLNESS
79 y/o female from home, bedbound, lives with HCP Danny ([phone]) who takes care of patient, has past medical hx of severe dementia, MS, HLD, hypertension, DM, hypothyroidism, stroke with left sided residual weakness?, CKD, chronic Macedo, was BIBEMS as patient was having shacking movements at home, making "funny faces" and not being herself. As per Ms Danny, patient was not complaining of any symptoms, no fevers, no sick contacts.     In ED patient was code Stroke and had a seizure episode s/p ativan x1  CTH and CTA head and neck showed stable chronic infarct left cerebellum and microvascular ischemic changes. Focal narrowing at origin of left vertebral artery.Narrowing supraclinoid internal carotid arteries    GOC DNR DNI

## 2021-08-07 NOTE — ED PROVIDER NOTE - PMH
Chronic kidney disease, unspecified CKD stage    CVA (cerebral vascular accident)    Dementia    Diabetes mellitus    Hyperlipidemia    Hypertension    Hypothyroidism    MS (multiple sclerosis)

## 2021-08-07 NOTE — ED ADULT NURSE REASSESSMENT NOTE - NS ED NURSE REASSESS COMMENT FT1
Pt received resting quietly in bed, no distress or seizure activity noted, currently condition remains stable safety and seizure precaution maintained.

## 2021-08-07 NOTE — ED PROVIDER NOTE - CRITICAL CARE ATTENDING CONTRIBUTION TO CARE
Evaluating for critical conditions noted above. Ordering tests. Reviewing results. Ordering medications as noted in MAR. Discussions with consultant. Evaluating for critical conditions noted above. Ordering tests. Reviewing results. Ordering medications as noted in MAR. Discussions with consultant. discussion w caregiver. discussion about code status.

## 2021-08-07 NOTE — ED ADULT NURSE NOTE - NSIMPLEMENTINTERV_GEN_ALL_ED
Implemented All Fall with Harm Risk Interventions:  [place] to call system. Call bell, personal items and telephone within reach. Instruct patient to call for assistance. Room bathroom lighting operational. Non-slip footwear when patient is off stretcher. Physically safe environment: no spills, clutter or unnecessary equipment. Stretcher in lowest position, wheels locked, appropriate side rails in place. Provide visual cue, wrist band, yellow gown, etc. Monitor gait and stability. Monitor for mental status changes and reorient to person, place, and time. Review medications for side effects contributing to fall risk. Reinforce activity limits and safety measures with patient and family. Provide visual clues: red socks.

## 2021-08-07 NOTE — ED PROVIDER NOTE - OBJECTIVE STATEMENT
81 y/o F, pre-arrival code stroke, w/ history of stroke left-sided deficits and MS, is bed-bound. Aide last saw her at 10:00. At 1:00, aide noticed patient was shaking, right side was weak, and she was not talking anymore. There was no injury and patient was on blood thinners. Unable to obtain injury for code stroke. 79 y/o F, pre-arrival code stroke, w/ history of stroke left-sided deficits and MS, is bed-bound. Aide last saw her at 10:00. At 1:00, aide noticed patient was shaking, right side was weak, and she was not talking anymore. There was no injury and patient was on blood thinners.

## 2021-08-07 NOTE — H&P ADULT - PROBLEM SELECTOR PLAN 1
- Patient admitted for acute encephalopathy   - Metabolic vs Infectious vs seizures   - CTH and CTA head and neck showed stable chronic infarct left cerebellum and microvascular ischemic changes. Focal narrowing at origin of left vertebral artery. Narrowing supraclinoid internal carotid arteries   - NPO for now  - Neuro checks q4 hours, Monitor for mental status changes   - Fall precautions/Aspiration precautions/Seizure precautions  - Will start on Aspirin rectally and Statins when not NPO   - Will hold antibiotics for now as no signs of infection    - F/u CXR, UA, Ucx to r/o infection, start Abx if febrile or any findings on labs or CXR  - Consider EEG to r/o seizure  - Neurology Dr Davis Consulted - Patient admitted for acute encephalopathy   - Metabolic vs Infectious vs seizures   - CTH and CTA head and neck showed stable chronic infarct left cerebellum and microvascular ischemic changes. Focal narrowing at origin of left vertebral artery. Narrowing supraclinoid internal carotid arteries   - NPO for now  - Neuro checks q4 hours, Monitor for mental status changes   - Fall precautions/Aspiration precautions/Seizure precautions  - Will start on Aspirin rectally and Statins when not NPO   - Will hold antibiotics for now as no signs of infection    - F/u CXR, UA, Ucx to r/o infection, start Abx if febrile or any findings on labs or CXR  - Consider EEG to r/o seizure  - Neurology Dr Davis Consulted  - Palliative consult in the am - Patient admitted for acute encephalopathy   - [place] vs Infectious vs seizures vs worsening dementia  - CXR unremarkable  - CTH and CTA head and neck showed stable chronic infarct left cerebellum and microvascular ischemic changes. Focal narrowing at origin of left vertebral artery. Narrowing supraclinoid internal carotid arteries   - NPO for now  - Neuro checks q4 hours, Monitor for mental status changes   - Fall precautions/Aspiration precautions/Seizure precautions  - Will start on Aspirin and Statins, hold Plavix until neuro recommendations   - Will hold antibiotics for now as no signs of infection    - F/u UA, Ucx to r/o infection, start Abx if febrile or any findings on labs   - Consider EEG to r/o seizure  - Neurology Dr Davis Consulted  - Palliative consult in the am  - Vascular surgery consult in the am

## 2021-08-07 NOTE — H&P ADULT - CONVERSATION DETAILS
Discussed GOC with HCP Ms Huanlluvia who referred she has MOLST signed DNR DNI, will bring it in the am

## 2021-08-07 NOTE — H&P ADULT - PROBLEM SELECTOR PLAN 7
- Patient has hx of CKD unknown baseline  - Has chronic Macedo, changed in ED - Patient with hx of hypothyroidism on Synthroid at home   - C/w home meds  - F/u TSH and adjust medications if needed

## 2021-08-07 NOTE — H&P ADULT - PROBLEM SELECTOR PLAN 6
- Patient with hx of hypothyroidism on Synthroid at home   - C/w home meds  - F/u TSH and adjust medications if needed - Patient has history of Hypertension not on any meds at home   - Monitor BP and start meds as needed

## 2021-08-07 NOTE — ED ADULT NURSE NOTE - OBJECTIVE STATEMENT
Pt arrived BIBA as a notification from home for generalized weakness, unable to speak, last time seen normal at 0900 according to HHA  Pt has Lt sided hemiparesis from previous condition, now not moving any limb, unable to speak, responds to deep painful stimuli  At 1400, pt was noted by staff member to have a seizure, Ativan IVP given

## 2021-08-07 NOTE — H&P ADULT - PROBLEM SELECTOR PLAN 3
- Patient has hx of MS - On admission, patient with hg 11, no signs of active bleeding   - As per HCP, no dark black tarry stool, no hx NSAIDs abuse, no episodes of BRBPR (low concern of GI bleed), no hematemesis    - No Hypotension or any signs of hemorrhagic shock   - Hemodynamically stable, IV access   - Type and Screen, Transfuse if Hg <7  - F/u iron panel in the am   - FOBT negative   - Monitor CBC daily

## 2021-08-07 NOTE — H&P ADULT - ASSESSMENT
79 y/o female from home, bedbound, lives with HCP Danny ([phone]) who takes care of patient, has past medical hx of severe dementia, MS, HLD, hypertension, DM, hypothyroidism, stroke with left sided residual weakness?, CKD, chronic Macedo, was BIBEMS as patient was having shacking movements at home, making "funny faces" and not being herself. Patient admitted for Acute encephalopathy and possible seizure

## 2021-08-07 NOTE — H&P ADULT - ATTENDING COMMENTS
Patient seen and examined. Patient's history, vitals, labs, imaging studies reviewed. Discussed with above resident, agree with note with edits, and educated on the diagnosis and management of above medical conditions. Plan of care discussed with patient, and agrees, all questions answered.   Virginia Shultz MD  8/7/2021.

## 2021-08-07 NOTE — H&P ADULT - NSHPPHYSICALEXAM_GEN_ALL_CORE
ICU Vital Signs Last 24 Hrs  T(C): 36.3 (07 Aug 2021 16:28), Max: 36.7 (07 Aug 2021 13:15)  T(F): 97.4 (07 Aug 2021 16:28), Max: 98 (07 Aug 2021 13:15)  HR: 107 (07 Aug 2021 16:28) (99 - 107)  BP: 159/76 (07 Aug 2021 16:28) (156/71 - 159/76)  RR: 20 (07 Aug 2021 16:28) (20 - 24)  SpO2: 100% (07 Aug 2021 16:28) (100% - 100%)    GENERAL: NAD, obese, sat well on RA  HEAD:  Atraumatic, Normocephalic  EYES:  conjunctiva and sclera clear, pupils reactive   NECK: Supple, No JVD, Normal thyroid  CHEST/LUNG: Clear to auscultation. Clear to percussion bilaterally; No rales, rhonchi, wheezing, or rubs  HEART: Regular rate and rhythm; No murmurs, rubs, or gallops  ABDOMEN: Soft, Nontender, Nondistended; Bowel sounds present, no pain or masses on palpation   NERVOUS SYSTEM: alert but not able to answer questions    EXTREMITIES:  2+ Peripheral Pulses, No clubbing, cyanosis, or edema  : Chronic Macedo   SKIN: warm, dry, unable to assess her back as patient was refusing

## 2021-08-07 NOTE — H&P ADULT - PROBLEM SELECTOR PLAN 5
- Patient has history of Hypertension not on any meds at home   - Monitor BP and start meds as needed - Patient takes not on any meds at home  - Started on HSS  - FS q 6 hrs while NPO   - When feasible to start diet (DASH diet with consistent carb), do fingerstick before meals and at bedtime  - Target -180  - F/u A1c

## 2021-08-07 NOTE — ED PROVIDER NOTE - NSICDXPASTMEDICALHX_GEN_ALL_CORE_FT
PAST MEDICAL HISTORY:  Chronic kidney disease, unspecified CKD stage     CVA (cerebral vascular accident)     Dementia     Diabetes mellitus     Hyperlipidemia     Hypertension     Hypothyroidism     MS (multiple sclerosis)

## 2021-08-07 NOTE — H&P ADULT - PROBLEM SELECTOR PLAN 4
- Patient takes not on any meds at home  - Started on HSS  - FS q 6 hrs while NPO   - When feasible to start diet (DASH diet with consistent carb), do fingerstick before meals and at bedtime  - Target -180  - F/u A1c - Patient has hx of MS, no acute intervention  - C/w home meds

## 2021-08-07 NOTE — H&P ADULT - PROBLEM SELECTOR PLAN 8
IMPROVE VTE Individual Risk Assessment  RISK                                                                Points  [  ] Previous VTE                                                  3  [  ] Thrombophilia                                               2  [  ] Lower limb paralysis                                      2        (unable to hold up >15 seconds)    [  ] Current Cancer                                              2         (within 6 months)  [  ] Immobilization > 24 hrs                                1  [  ] ICU/CCU stay > 24 hours                              1  [  ] Age > 60                                                      1  IMPROVE VTE Score ____2_____    PPI for GI prophylaxis  Lovenox for DVT PPX - Patient has hx of CKD unknown baseline  - Has chronic Macedo, changed in ED

## 2021-08-07 NOTE — ED PROVIDER NOTE - CLINICAL SUMMARY MEDICAL DECISION MAKING FREE TEXT BOX
code stroke on arrival for non-verbal acutely w poss new R side deficits. ct/cta. bedside dw pt's decision maker and telestroke. given patient's wishes as well as the uncertainty regarding diagnosis as well as the potential that this was a seizure/post ictal (tpa does not treat seizure, only if seizure is due to new acute stroke) all 3 of us agree to not give tpa right now. will admit for further eval / treatment

## 2021-08-07 NOTE — H&P ADULT - PROBLEM SELECTOR PLAN 2
- Patient with new onset of seizures, post ictal period unknown   - On exam unable to assess for neuro deficits as patient does not follow commands and is contracted  - Differentials Stroke/TIA vs Hypoglycemia   - Focal (impaired or retained awareness) vs Generalized  - EKG showed NSR, no acute changes   - Consider EEG to r/o seizure  - Seizure/aspiration/fall precautions  - Neuro Dr Davis consulted

## 2021-08-07 NOTE — ED PROVIDER NOTE - PROGRESS NOTE DETAILS
Danny is friend that takes care of patient. Phone number is [phone]. Danny is friend that takes care of patient. Phone number is [phone]. pt had sz - ativan given w resolution. sz / post ictal most likely cause of sxs and not cva. had dw pt friend who is her health care decision maker and telestroke neuro - decision made not to give tpa. discussed the case with the admitting MD who accepts the patient for admission

## 2021-08-07 NOTE — CONSULT NOTE ADULT - ASSESSMENT
Stroke alert called by Dr. Blackwell at 1:25 pm, pt is available for telestroke A/V consult at 1:55 pm:    Janessa Willis: 80 yr h/o stroke/LL-[id number]-gsei when food is served, AAO x1 & bedbound/left hemiplegia/RLE weakness/chronic mcfadden at baseline with DNR advanced directives; presents after an episode of generalized upper body shaking at 10:00 am->since then pt has been non-verbal/confused and not following commands. CTH-normal. Discussed with friend/HHA/HCP for 40 yrs at the bedside->she states 'I don't want her to suffer when mentioned about the possibility of 6.4% risk of SICH/death with thrombolysis' and wishes treatments with palliative care as the focus instead of aggressive medical measures. Thus patient is not a candidate for IV thrombolysis.     AMS from seizure vs. infectious/metabolic vs. other  -please call with any abnormal results on CTA head & neck  -infectious/metabolic w/u per ED/adm.teams  -consult local neurology team for further recommendations  -discussed with Dr. Blackwell

## 2021-08-08 ENCOUNTER — TRANSCRIPTION ENCOUNTER (OUTPATIENT)
Age: 81
End: 2021-08-08

## 2021-08-08 LAB
A1C WITH ESTIMATED AVERAGE GLUCOSE RESULT: 5.4 % — SIGNIFICANT CHANGE UP (ref 4–5.6)
ANION GAP SERPL CALC-SCNC: 4 MMOL/L — LOW (ref 5–17)
APPEARANCE UR: CLEAR — SIGNIFICANT CHANGE UP
BACTERIA # UR AUTO: ABNORMAL /HPF
BILIRUB UR-MCNC: NEGATIVE — SIGNIFICANT CHANGE UP
BLD GP AB SCN SERPL QL: SIGNIFICANT CHANGE UP
BUN SERPL-MCNC: 30 MG/DL — HIGH (ref 7–18)
CALCIUM SERPL-MCNC: 9 MG/DL — SIGNIFICANT CHANGE UP (ref 8.4–10.5)
CHLORIDE SERPL-SCNC: 108 MMOL/L — SIGNIFICANT CHANGE UP (ref 96–108)
CHOLEST SERPL-MCNC: 183 MG/DL — SIGNIFICANT CHANGE UP
CO2 SERPL-SCNC: 28 MMOL/L — SIGNIFICANT CHANGE UP (ref 22–31)
COLOR SPEC: YELLOW — SIGNIFICANT CHANGE UP
COMMENT - URINE: SIGNIFICANT CHANGE UP
COVID-19 SPIKE DOMAIN AB INTERP: NEGATIVE — SIGNIFICANT CHANGE UP
COVID-19 SPIKE DOMAIN ANTIBODY RESULT: 0.4 U/ML — SIGNIFICANT CHANGE UP
CREAT SERPL-MCNC: 0.94 MG/DL — SIGNIFICANT CHANGE UP (ref 0.5–1.3)
DIFF PNL FLD: ABNORMAL
EPI CELLS # UR: SIGNIFICANT CHANGE UP /HPF
ESTIMATED AVERAGE GLUCOSE: 108 MG/DL — SIGNIFICANT CHANGE UP (ref 68–114)
FERRITIN SERPL-MCNC: 46 NG/ML — SIGNIFICANT CHANGE UP (ref 15–150)
GLUCOSE BLDC GLUCOMTR-MCNC: 102 MG/DL — HIGH (ref 70–99)
GLUCOSE BLDC GLUCOMTR-MCNC: 102 MG/DL — HIGH (ref 70–99)
GLUCOSE BLDC GLUCOMTR-MCNC: 107 MG/DL — HIGH (ref 70–99)
GLUCOSE BLDC GLUCOMTR-MCNC: 93 MG/DL — SIGNIFICANT CHANGE UP (ref 70–99)
GLUCOSE SERPL-MCNC: 84 MG/DL — SIGNIFICANT CHANGE UP (ref 70–99)
GLUCOSE UR QL: NEGATIVE — SIGNIFICANT CHANGE UP
GRAN CASTS # UR COMP ASSIST: ABNORMAL /LPF
HCT VFR BLD CALC: 24.2 % — LOW (ref 34.5–45)
HDLC SERPL-MCNC: 57 MG/DL — SIGNIFICANT CHANGE UP
HGB BLD-MCNC: 7.6 G/DL — LOW (ref 11.5–15.5)
IRON SATN MFR SERPL: 11 % — LOW (ref 15–50)
IRON SATN MFR SERPL: 28 UG/DL — LOW (ref 40–150)
KETONES UR-MCNC: NEGATIVE — SIGNIFICANT CHANGE UP
LEUKOCYTE ESTERASE UR-ACNC: ABNORMAL
LIPID PNL WITH DIRECT LDL SERPL: 113 MG/DL — HIGH
MAGNESIUM SERPL-MCNC: 2.2 MG/DL — SIGNIFICANT CHANGE UP (ref 1.6–2.6)
MCHC RBC-ENTMCNC: 26.8 PG — LOW (ref 27–34)
MCHC RBC-ENTMCNC: 31.4 GM/DL — LOW (ref 32–36)
MCV RBC AUTO: 85.2 FL — SIGNIFICANT CHANGE UP (ref 80–100)
NITRITE UR-MCNC: NEGATIVE — SIGNIFICANT CHANGE UP
NON HDL CHOLESTEROL: 126 MG/DL — SIGNIFICANT CHANGE UP
NRBC # BLD: 0 /100 WBCS — SIGNIFICANT CHANGE UP (ref 0–0)
PH UR: 5 — SIGNIFICANT CHANGE UP (ref 5–8)
PHOSPHATE SERPL-MCNC: 3.5 MG/DL — SIGNIFICANT CHANGE UP (ref 2.5–4.5)
PLATELET # BLD AUTO: 250 K/UL — SIGNIFICANT CHANGE UP (ref 150–400)
POTASSIUM SERPL-MCNC: 5 MMOL/L — SIGNIFICANT CHANGE UP (ref 3.5–5.3)
POTASSIUM SERPL-SCNC: 5 MMOL/L — SIGNIFICANT CHANGE UP (ref 3.5–5.3)
PROT UR-MCNC: 100
RBC # BLD: 2.84 M/UL — LOW (ref 3.8–5.2)
RBC # FLD: 15.8 % — HIGH (ref 10.3–14.5)
RBC CASTS # UR COMP ASSIST: ABNORMAL /HPF (ref 0–2)
SARS-COV-2 IGG+IGM SERPL QL IA: 0.4 U/ML — SIGNIFICANT CHANGE UP
SARS-COV-2 IGG+IGM SERPL QL IA: NEGATIVE — SIGNIFICANT CHANGE UP
SODIUM SERPL-SCNC: 140 MMOL/L — SIGNIFICANT CHANGE UP (ref 135–145)
SP GR SPEC: 1.01 — SIGNIFICANT CHANGE UP (ref 1.01–1.02)
TIBC SERPL-MCNC: 261 UG/DL — SIGNIFICANT CHANGE UP (ref 250–450)
TRANSFERRIN SERPL-MCNC: 220 MG/DL — SIGNIFICANT CHANGE UP (ref 200–360)
TRIGL SERPL-MCNC: 63 MG/DL — SIGNIFICANT CHANGE UP
TSH SERPL-MCNC: 3.32 UU/ML — SIGNIFICANT CHANGE UP (ref 0.34–4.82)
UIBC SERPL-MCNC: 233 UG/DL — SIGNIFICANT CHANGE UP (ref 110–370)
URATE CRY FLD QL MICRO: ABNORMAL /HPF
UROBILINOGEN FLD QL: NEGATIVE — SIGNIFICANT CHANGE UP
WBC # BLD: 6.61 K/UL — SIGNIFICANT CHANGE UP (ref 3.8–10.5)
WBC # FLD AUTO: 6.61 K/UL — SIGNIFICANT CHANGE UP (ref 3.8–10.5)
WBC UR QL: ABNORMAL /HPF (ref 0–5)

## 2021-08-08 PROCEDURE — 99223 1ST HOSP IP/OBS HIGH 75: CPT

## 2021-08-08 RX ADMIN — ENOXAPARIN SODIUM 40 MILLIGRAM(S): 100 INJECTION SUBCUTANEOUS at 01:07

## 2021-08-08 RX ADMIN — ENOXAPARIN SODIUM 40 MILLIGRAM(S): 100 INJECTION SUBCUTANEOUS at 12:34

## 2021-08-08 RX ADMIN — SODIUM CHLORIDE 50 MILLILITER(S): 9 INJECTION, SOLUTION INTRAVENOUS at 03:09

## 2021-08-08 NOTE — CHART NOTE - NSCHARTNOTEFT_GEN_A_CORE
Health care proxy Danny Willams at bedside and brought Nonhospital order not to resuscitate form.  Her  is listed as first health care proxy however he passed away 6 years ago.  Next listed is Danny Willams who lives with patient and takes care of patient.    Completed MOLST form with Danny Willams  DNR/DNI  No feeding tube, okay for antibiotics, IV fluids.      Of note, patient now more verbal and asking questions.  Temperature slowly rising with hypothermic warming.    Oumar Valdovinos NP

## 2021-08-08 NOTE — PROGRESS NOTE ADULT - SUBJECTIVE AND OBJECTIVE BOX
Patient is a 80 year old female who presents with a chief complaint of ACUTE ENCEPHALOPATHY (07 Aug 2021 18:18)        MEDICATIONS  (STANDING):  aspirin  chewable 81 milliGRAM(s) Oral daily  atorvastatin 40 milliGRAM(s) Oral at bedtime  citalopram 10 milliGRAM(s) Oral daily  dextrose 5% + sodium chloride 0.9%. 1000 milliLiter(s) (50 mL/Hr) IV Continuous <Continuous>  enoxaparin Injectable 40 milliGRAM(s) SubCutaneous daily  folic acid 1 milliGRAM(s) Oral daily  insulin lispro (ADMELOG) corrective regimen sliding scale   SubCutaneous every 6 hours  levothyroxine 25 MICROGram(s) Oral daily  mirtazapine 15 milliGRAM(s) Oral at bedtime  risperiDONE   Tablet 0.5 milliGRAM(s) Oral two times a day        REVIEW OF SYSTEMS:  CONSTITUTIONAL: No fever, weight loss, or fatigue  EYES: No eye pain, visual disturbances, or discharge  ENMT:  No difficulty hearing, tinnitus, vertigo; No sinus or throat pain  NECK: No pain or stiffness  RESPIRATORY: No cough, wheezing, chills or hemoptysis; No shortness of breath  CARDIOVASCULAR: No chest pain, palpitations, dizziness, or leg swelling  GASTROINTESTINAL: No abdominal or epigastric pain. No nausea, vomiting, or hematemesis; No diarrhea or constipation. No melena or hematochezia.  GENITOURINARY: No dysuria, frequency, hematuria, or incontinence  NEUROLOGICAL: No headaches, memory loss, loss of strength, numbness, or tremors  SKIN: No itching, burning, rashes, or lesions   ENDOCRINE: No heat or cold intolerance; No hair loss  MUSCULOSKELETAL: No joint pain or swelling; No muscle, back, or extremity pain  PSYCHIATRIC: No depression, anxiety, mood swings, or difficulty sleeping  HEME/LYMPH: No easy bruising, or bleeding gums  ALLERY AND IMMUNOLOGIC: No hives or eczema    PHYSICAL EXAM:    T(C): 34.1 (21 @ 13:28), Max: 36.3 (21 @ 04:40)  HR: 57 (21 @ 13:28) (53 - 66)  BP: 148/54 (21 @ 13:28) (111/52 - 148/54)  RR: 20 (21 @ 13:28) (20 - 20)  SpO2: 100% (21 @ 13:28) (100% - 100%)  Wt(kg): --  I&O's Summary    08 Aug 2021 07:01  -  08 Aug 2021 16:33  --------------------------------------------------------  IN: 0 mL / OUT: 300 mL / NET: -300 mL        GENERAL: NAD, well-groomed, well-developed  HEAD:  Atraumatic, Normocephalic  EYES: EOMI, PERRL, conjunctiva and sclera clear  ENMT: No tonsillar erythema, exudates, or enlargement; Moist mucous membranes, Good dentition, No lesions  NECK: Supple, No JVD, Normal thyroid  NERVOUS SYSTEM:  Alert & Oriented X3, Good concentration; Motor Strength 5/5 B/L upper and lower extremities; DTRs 2+ intact and symmetric  CHEST/LUNG: Clear to ascultation bilaterally; No rales, rhonchi, wheezing, or rubs  HEART: Regular rate and rhythm; No murmurs, rubs, or gallops  ABDOMEN: Soft, Nontender, Nondistended; Bowel sounds present  EXTREMITIES:  2+ Peripheral Pulses, No clubbing, cyanosis, or edema  SKIN: No rashes or lesions    LABS:                        7.6    6.61  )-----------( 250      ( 08 Aug 2021 06:58 )             24.2     08-08    140  |  108  |  30<H>  ----------------------------<  84  5.0   |  28  |  0.94    Ca    9.0      08 Aug 2021 06:58  Phos  3.5     08-08  Mg     2.2     08-08    TPro  6.2  /  Alb  2.6<L>  /  TBili  0.2  /  DBili  x   /  AST  17  /  ALT  29  /  AlkPhos  109  08-07    PT/INR - ( 07 Aug 2021 14:30 )   PT: 11.6 sec;   INR: 0.97 ratio         PTT - ( 07 Aug 2021 14:30 )  PTT:35.4 sec  Urinalysis Basic - ( 08 Aug 2021 01:28 )    Color: Yellow / Appearance: Clear / S.010 / pH: x  Gluc: x / Ketone: Negative  / Bili: Negative / Urobili: Negative   Blood: x / Protein: 100 / Nitrite: Negative   Leuk Esterase: Moderate / RBC: 2-5 /HPF / WBC 11-25 /HPF   Sq Epi: x / Non Sq Epi: Few /HPF / Bacteria: Moderate /HPF      CAPILLARY BLOOD GLUCOSE      POCT Blood Glucose.: 102 mg/dL (08 Aug 2021 11:33)  POCT Blood Glucose.: 107 mg/dL (08 Aug 2021 06:32)  POCT Blood Glucose.: 93 mg/dL (08 Aug 2021 00:31)      RADIOLOGY & ADDITIONAL TESTS:    Imaging Reviewed:  [x] YES  [ ] NO    Consultant(s) Notes Reviewed:  [x] YES  [ ] NO    Care Discussed with Consultants/Other Providers [x] YES  [ ] NO Patient is a 80 year old female who presents with a chief complaint of ACUTE ENCEPHALOPATHY (07 Aug 2021 18:18)    Patient seen and examined earlier today, on warming blanket, temperature improving.    MEDICATIONS  (STANDING):  aspirin  chewable 81 milliGRAM(s) Oral daily  atorvastatin 40 milliGRAM(s) Oral at bedtime  citalopram 10 milliGRAM(s) Oral daily  dextrose 5% + sodium chloride 0.9%. 1000 milliLiter(s) (50 mL/Hr) IV Continuous <Continuous>  enoxaparin Injectable 40 milliGRAM(s) SubCutaneous daily  folic acid 1 milliGRAM(s) Oral daily  insulin lispro (ADMELOG) corrective regimen sliding scale   SubCutaneous every 6 hours  levothyroxine 25 MICROGram(s) Oral daily  mirtazapine 15 milliGRAM(s) Oral at bedtime  risperiDONE   Tablet 0.5 milliGRAM(s) Oral two times a day        REVIEW OF SYSTEMS: Patient unable to provide as she is confused    PHYSICAL EXAM:    T(C): 34.1 (21 @ 13:28), Max: 36.3 (21 @ 04:40)  HR: 57 (21 @ 13:28) (53 - 66)  BP: 148/54 (21 @ 13:28) (111/52 - 148/54)  RR: 20 (21 @ 13:28) (20 - 20)  SpO2: 100% (21 @ 13:28) (100% - 100%)  I&O's Summary    08 Aug 2021 07:01  -  08 Aug 2021 16:33  --------------------------------------------------------  IN: 0 mL / OUT: 300 mL / NET: -300 mL        GENERAL: NAD, well-groomed, well-developed  HEAD:  Atraumatic, Normocephalic  EYES: EOMI, PERRL, conjunctiva and sclera clear  ENMT: No tonsillar erythema, exudates, or enlargement; Moist mucous membranes, Good dentition, No lesions  NECK: Supple, No JVD, Normal thyroid  NERVOUS SYSTEM:  Alert & Oriented X3, Good concentration; Motor Strength 5/5 B/L upper and lower extremities; DTRs 2+ intact and symmetric  CHEST/LUNG: Clear to ascultation bilaterally; No rales, rhonchi, wheezing, or rubs  HEART: Regular rate and rhythm; No murmurs, rubs, or gallops  ABDOMEN: Soft, Nontender, Nondistended; Bowel sounds present  EXTREMITIES:  2+ Peripheral Pulses, No clubbing, cyanosis, or edema  SKIN: No rashes or lesions    LABS:                        7.6    6.61  )-----------( 250      ( 08 Aug 2021 06:58 )             24.2     08-08    140  |  108  |  30<H>  ----------------------------<  84  5.0   |  28  |  0.94    Ca    9.0      08 Aug 2021 06:58  Phos  3.5     08-08  Mg     2.2     08-08    TPro  6.2  /  Alb  2.6<L>  /  TBili  0.2  /  DBili  x   /  AST  17  /  ALT  29  /  AlkPhos  109  08-07    PT/INR - ( 07 Aug 2021 14:30 )   PT: 11.6 sec;   INR: 0.97 ratio         PTT - ( 07 Aug 2021 14:30 )  PTT:35.4 sec  Urinalysis Basic - ( 08 Aug 2021 01:28 )    Color: Yellow / Appearance: Clear / S.010 / pH: x  Gluc: x / Ketone: Negative  / Bili: Negative / Urobili: Negative   Blood: x / Protein: 100 / Nitrite: Negative   Leuk Esterase: Moderate / RBC: 2-5 /HPF / WBC 11-25 /HPF   Sq Epi: x / Non Sq Epi: Few /HPF / Bacteria: Moderate /HPF      CAPILLARY BLOOD GLUCOSE      POCT Blood Glucose.: 102 mg/dL (08 Aug 2021 11:33)  POCT Blood Glucose.: 107 mg/dL (08 Aug 2021 06:32)  POCT Blood Glucose.: 93 mg/dL (08 Aug 2021 00:31)      RADIOLOGY & ADDITIONAL TESTS:    Imaging Reviewed:  [x] YES  [ ] NO    Consultant(s) Notes Reviewed:  [x] YES  [ ] NO    Care Discussed with Consultants/Other Providers [x] YES  [ ] NO Patient is a 80 year old female who presents with a chief complaint of ACUTE ENCEPHALOPATHY (07 Aug 2021 18:18)    Patient seen and examined earlier today, on warming blanket, temperature improving.    MEDICATIONS  (STANDING):  aspirin  chewable 81 milliGRAM(s) Oral daily  atorvastatin 40 milliGRAM(s) Oral at bedtime  citalopram 10 milliGRAM(s) Oral daily  dextrose 5% + sodium chloride 0.9%. 1000 milliLiter(s) (50 mL/Hr) IV Continuous <Continuous>  enoxaparin Injectable 40 milliGRAM(s) SubCutaneous daily  folic acid 1 milliGRAM(s) Oral daily  insulin lispro (ADMELOG) corrective regimen sliding scale   SubCutaneous every 6 hours  levothyroxine 25 MICROGram(s) Oral daily  mirtazapine 15 milliGRAM(s) Oral at bedtime  risperiDONE   Tablet 0.5 milliGRAM(s) Oral two times a day        REVIEW OF SYSTEMS: Patient unable to provide as she is confused    PHYSICAL EXAM:    T(C): 34.1 (21 @ 13:28), Max: 36.3 (21 @ 04:40)  HR: 57 (21 @ 13:28) (53 - 66)  BP: 148/54 (21 @ 13:28) (111/52 - 148/54)  RR: 20 (21 @ 13:28) (20 - 20)  SpO2: 100% (21 @ 13:28) (100% - 100%)  I&O's Summary    08 Aug 2021 07:01  -  08 Aug 2021 16:33  --------------------------------------------------------  IN: 0 mL / OUT: 300 mL / NET: -300 mL        GENERAL: NAD, well-groomed, well-developed  HEAD:  Atraumatic, Normocephalic  EYES: EOMI, PERRL, conjunctiva and sclera clear  ENMT: moist mucous membranes  NECK: Supple, No JVD, Normal thyroid  NERVOUS SYSTEM:  Alert & Oriented X1  CHEST/LUNG: Clear to ascultation bilaterally; No rales, rhonchi, wheezing, or rubs  HEART: Regular rate and rhythm; No murmurs, rubs, or gallops  ABDOMEN: Soft, Nontender, Nondistended; Bowel sounds present  : mcfadden in place draining clear urine  EXTREMITIES:  2+ Peripheral Pulses, No clubbing, cyanosis, or edema  SKIN: No rashes or lesions    LABS:                        7.6    6.61  )-----------( 250      ( 08 Aug 2021 06:58 )             24.2     08-08    140  |  108  |  30<H>  ----------------------------<  84  5.0   |  28  |  0.94    Ca    9.0      08 Aug 2021 06:58  Phos  3.5     08-08  Mg     2.2     08-08    TPro  6.2  /  Alb  2.6<L>  /  TBili  0.2  /  DBili  x   /  AST  17  /  ALT  29  /  AlkPhos  109  08-07    PT/INR - ( 07 Aug 2021 14:30 )   PT: 11.6 sec;   INR: 0.97 ratio         PTT - ( 07 Aug 2021 14:30 )  PTT:35.4 sec  Urinalysis Basic - ( 08 Aug 2021 01:28 )    Color: Yellow / Appearance: Clear / S.010 / pH: x  Gluc: x / Ketone: Negative  / Bili: Negative / Urobili: Negative   Blood: x / Protein: 100 / Nitrite: Negative   Leuk Esterase: Moderate / RBC: 2-5 /HPF / WBC 11-25 /HPF   Sq Epi: x / Non Sq Epi: Few /HPF / Bacteria: Moderate /HPF      CAPILLARY BLOOD GLUCOSE      POCT Blood Glucose.: 102 mg/dL (08 Aug 2021 11:33)  POCT Blood Glucose.: 107 mg/dL (08 Aug 2021 06:32)  POCT Blood Glucose.: 93 mg/dL (08 Aug 2021 00:31)      RADIOLOGY & ADDITIONAL TESTS:    Imaging Reviewed:  [x] YES  [ ] NO    Consultant(s) Notes Reviewed:  [x] YES  [ ] NO    Care Discussed with Consultants/Other Providers [x] YES  [ ] NO

## 2021-08-08 NOTE — DISCHARGE NOTE PROVIDER - NSDCMRMEDTOKEN_GEN_ALL_CORE_FT
aspirin 81 mg oral tablet, chewable: 1 tab(s) orally once a day  citalopram 10 mg oral tablet: 1 tab(s) orally once a day  folic acid 1 mg oral tablet: 1 tab(s) orally once a day  levothyroxine 25 mcg (0.025 mg) oral tablet: 1 tab(s) orally once a day  mirtazapine 15 mg oral tablet: 1 tab(s) orally once a day (at bedtime)  risperiDONE 0.5 mg oral tablet: 1 tab(s) orally 2 times a day   aspirin 81 mg oral tablet, chewable: 1 tab(s) orally once a day  citalopram 10 mg oral tablet: 1 tab(s) orally once a day  folic acid 1 mg oral tablet: 1 tab(s) orally once a day MDD:1 tab  levothyroxine 25 mcg (0.025 mg) oral tablet: 1 tab(s) orally once a day  metoprolol tartrate 25 mg oral tablet: 0.5 tab(s) orally 2 times a day MDD:25 mg  mirtazapine 15 mg oral tablet: 1 tab(s) orally once a day (at bedtime)  risperiDONE 0.5 mg oral tablet: 1 tab(s) orally 2 times a day

## 2021-08-08 NOTE — DISCHARGE NOTE PROVIDER - HOSPITAL COURSE
79 y/o female from home, bedbound, lives with HCP Danny ([phone]) who takes care of patient, has past medical hx of severe dementia, MS, HLD, hypertension, DM, hypothyroidism, stroke with left sided residual weakness?, CKD, chronic Macedo, was BIBEMS as patient was having shacking movements at home, making "funny faces" and not being herself. In ED, patient had a code Stroke called and had a seizure episode s/p ativan x1.  CTA head and neck showed stable chronic infarct left cerebellum and microvascular ischemic changes. Focal narrowing at origin of left vertebral artery.Narrowing supraclinoid internal carotid arteries  Patient admitted for Acute encephalopathy and possible seizure.         >>>>>>>>>>>>>>>>>>>>>>>>>>>>>>>>>>>>>>>INCOMPLETE>>>>>>>>>>>>>>>>>>>>>>>>>>>>>>>>>>>>>>>>>>>>>>>> 79 y/o female from home, bedbound, lives with HCP Danny ([phone]) who takes care of patient, has past medical hx of severe dementia, MS, HLD, hypertension, DM, hypothyroidism, stroke with left sided residual weakness?, CKD, chronic Macedo, was BIBEMS as patient was having shacking movements at home, making "funny faces" and not being herself. In ED, patient had a code Stroke called and had a seizure episode s/p ativan x1.  CTA head and neck showed stable chronic infarct left cerebellum and microvascular ischemic changes. Focal narrowing at origin of left vertebral artery.Narrowing supraclinoid internal carotid arteries  Patient admitted for Acute encephalopathy and possible seizure. Patient was seen by neurology, recommended EEG, MRI brain/MRA head+ neck, PT/SLP. EEG without epileptiform abnormalities/no seizures. Palliative care was consulted to discuss GOC. Caregiver agreed to comfort measures only with plan for home hospice. VNS referral made prior to discharge.         >>>>>>>>>>>>>>>>>>>>>>>>>>>>>>>>>>>>>>>INCOMPLETE>>>>>>>>>>>>>>>>>>>>>>>>>>>>>>>>>>>>>>>>>>>>>>>> 81 y/o female from home, bedbound, lives with HCP Danny ([phone]) who takes care of patient, has past medical hx of severe dementia, MS, HLD, hypertension, DM, hypothyroidism, stroke with left sided residual weakness?, CKD, chronic Macedo, was BIBEMS as patient was having shacking movements at home, making "funny faces" and not being herself. In ED, patient had a code Stroke called and had a seizure episode s/p ativan x1.  CTA head and neck showed stable chronic infarct left cerebellum and microvascular ischemic changes. Focal narrowing at origin of left vertebral artery.Narrowing supraclinoid internal carotid arteries  Patient admitted for Acute encephalopathy and possible seizure. Patient was seen by neurology, recommended EEG, MRI brain/MRA head+ neck, PT/SLP. EEG without epileptiform abnormalities/no seizures. Palliative care was consulted to discuss GOC. Caregiver agreed to comfort measures only with plan for home hospice. VNS referral made prior to discharge.          79 y/o female from home, bedbound, lives with HCP Danny ([phone]) who takes care of patient, has past medical hx of severe dementia, MS, HLD, hypertension, DM, hypothyroidism, stroke with left sided residual weakness?, CKD, chronic Macedo, was BIBEMS as patient was having shaking movements at home, making "funny faces" and not being herself. In ED, patient had a code Stroke called and had a seizure episode s/p ativan x1.  CTA head and neck showed stable chronic infarct left cerebellum and microvascular ischemic changes. Focal narrowing at origin of left vertebral artery. Narrowing supraclinoid internal carotid arteries  Patient admitted for Acute encephalopathy and possible seizure. Patient was seen by neurology, recommended EEG, MRI brain/MRA head+ neck, PT/SLP. EEG without epileptiform abnormalities/no seizures. Palliative care was consulted to discuss GOC. MRI/MRA, vascular cancelled per goals of care. Caregiver agreed to comfort measures only with plan for home hospice. VNS referral made prior to discharge.          79 y/o female from home, bedbound, lives with HCP Danny ([phone]) who takes care of patient, has past medical hx of severe dementia, MS, HLD, hypertension, DM, hypothyroidism, stroke with left sided residual weakness?, CKD, chronic Macedo, was BIBEMS as patient was having shaking movements at home, making "funny faces" and not being herself. In ED, patient had a code Stroke called and had a seizure episode s/p ativan x1.  CTA head and neck showed stable chronic infarct left cerebellum and microvascular ischemic changes. Focal narrowing at origin of left vertebral artery. Narrowing supraclinoid internal carotid arteries  Patient admitted for Acute encephalopathy and possible seizure. Patient was seen by neurology, recommended EEG, MRI brain/MRA head+ neck, PT/SLP. EEG without epileptiform abnormalities/no seizures. Palliative care was consulted to discuss GOC. MRI/MRA, vascular cancelled per goals of care. Caregiver agreed to comfort measures only with plan for home hospice. VNS referral made prior to discharge.   Pt is cleared for discharge to home hospice.

## 2021-08-08 NOTE — DISCHARGE NOTE PROVIDER - CONDITIONS AT DISCHARGE
Case has been discussed w/ Attending Dr. Shultz directing pt's care and pt is cleared for discharge.

## 2021-08-08 NOTE — DISCHARGE NOTE PROVIDER - NSDCCPCAREPLAN_GEN_ALL_CORE_FT
PRINCIPAL DISCHARGE DIAGNOSIS  Diagnosis: Encephalopathy acute  Assessment and Plan of Treatment:       SECONDARY DISCHARGE DIAGNOSES  Diagnosis: Seizure  Assessment and Plan of Treatment:     Diagnosis: Moderate protein malnutrition  Assessment and Plan of Treatment:     Diagnosis: Palliative care encounter  Assessment and Plan of Treatment:     Diagnosis: Chronic kidney disease, unspecified CKD stage  Assessment and Plan of Treatment: Chronic kidney disease, unspecified CKD stage    Diagnosis: Quadriplegia, functional  Assessment and Plan of Treatment:     Diagnosis: Advanced dementia  Assessment and Plan of Treatment:     Diagnosis: Diabetes mellitus  Assessment and Plan of Treatment: Diabetes mellitus    Diagnosis: MS (multiple sclerosis)  Assessment and Plan of Treatment: MS (multiple sclerosis)     PRINCIPAL DISCHARGE DIAGNOSIS  Diagnosis: Encephalopathy acute  Assessment and Plan of Treatment: You were admitted for acute encephalopathy which was likely due to chronic rt cerebellar infarct vs infectious source.  Please report any new changes in your condition such as increase confusion. continue your medication.      SECONDARY DISCHARGE DIAGNOSES  Diagnosis: Seizure  Assessment and Plan of Treatment: Please continue taking your medications as prescribed and report any changes in your condition or any symptoms of seizure like activity such as tremors.    Diagnosis: MS (multiple sclerosis)  Assessment and Plan of Treatment: Continue taking your medication as prescribed.    Diagnosis: Diabetes mellitus  Assessment and Plan of Treatment: Continue taking your medication as prescribed and follow up with your doctor.  Report signs and symptoms of hypo and hyperglycemia to your doctor such as dizziness, lightheadedness, increasing thirst, appetite and increase urination, cold, clammy skin.      Diagnosis: Chronic kidney disease, unspecified CKD stage  Assessment and Plan of Treatment: Continue taking your medication as prescribed to help prevent worsening end organ damage.    Diagnosis: Quadriplegia, functional  Assessment and Plan of Treatment: Frequent turn and reposition to assist with off load and prevent skin breakdown.    Diagnosis: Advanced dementia  Assessment and Plan of Treatment: Continue taking any prescribed medication and report any changes in your condition.    Diagnosis: Palliative care encounter  Assessment and Plan of Treatment: You were evaluated by the palliative and you are being discharged to home hospice. Continue all current prescribed medication.    Diagnosis: Moderate protein malnutrition  Assessment and Plan of Treatment: Please take small frequent meals to help manageing your protein malnutrition and increase your protein intake.     PRINCIPAL DISCHARGE DIAGNOSIS  Diagnosis: Encephalopathy acute  Assessment and Plan of Treatment: You were admitted for acute encephalopathy which was likely due to seizure activity.  Please report any new changes in your condition such as increase confusion. continue your medication.      SECONDARY DISCHARGE DIAGNOSES  Diagnosis: Seizure  Assessment and Plan of Treatment: Please continue taking your medications as prescribed and report any changes in your condition or any symptoms of seizure like activity such as tremors.  Your seizure was managed with Ativan while in the Emergency department.    Diagnosis: MS (multiple sclerosis)  Assessment and Plan of Treatment: Continue taking your medication as prescribed.    Diagnosis: Chronic kidney disease, unspecified CKD stage  Assessment and Plan of Treatment: Continue taking your medication as prescribed to help prevent worsening end organ damage.    Diagnosis: Quadriplegia, functional  Assessment and Plan of Treatment: Frequent turn and reposition to assist with off load and prevent skin breakdown.    Diagnosis: Diabetes mellitus  Assessment and Plan of Treatment: Your HgbA1c was 5.4.  You do not need medication currently to control your blood sugar.  Report or monitor for signs and symptoms of hypo and hyperglycemia to your doctor such as dizziness, lightheadedness, increasing thirst, appetite and increase urination, cold, clammy skin.      Diagnosis: Advanced dementia  Assessment and Plan of Treatment: Continue taking any prescribed medication and report any changes in your condition.    Diagnosis: Palliative care encounter  Assessment and Plan of Treatment: You were evaluated by the palliative and you are being discharged to home hospice. Continue all current prescribed medication.    Diagnosis: Moderate protein malnutrition  Assessment and Plan of Treatment: Please take small frequent meals to help manageing your protein malnutrition and increase your protein intake.

## 2021-08-08 NOTE — CHART NOTE - NSCHARTNOTEFT_GEN_A_CORE
Called by bedside RN for hypothermia, tested by rectal temperature on multiple occasions with different thermometers.  Latest temperature 93.7 rectally.    Pt. awake, responds Hi when greeted.  Does not answer questions for how she's feeling.  HOMERO b/l  S1, S2, rrr, no MGR  Lungs CTA b/l  Extremities feel cold, no diaphoresis noted.    ICU Vital Signs Last 24 Hrs  T(C): 34.1 (08 Aug 2021 13:28), Max: 36.3 (07 Aug 2021 16:28)  T(F): 93.4 (08 Aug 2021 13:28), Max: 97.4 (07 Aug 2021 16:28)  HR: 57 (08 Aug 2021 13:28) (53 - 107)  BP: 148/54 (08 Aug 2021 13:28) (111/52 - 159/76)  RR: 20 (08 Aug 2021 13:28) (20 - 20)  SpO2: 100% (08 Aug 2021 13:28) (100% - 100%)    1) Hypothermia: Pt. actually doing better clinically, this morning patient was lethargic and difficult to arouse.  Will use hypothermic blanket to slowly warm patient aim for inc rectal temp by 1 degree Fahrenheit per hour. Cont D5/PSS.    Oumar Valdovinos NP

## 2021-08-09 DIAGNOSIS — F03.90 UNSPECIFIED DEMENTIA, UNSPECIFIED SEVERITY, WITHOUT BEHAVIORAL DISTURBANCE, PSYCHOTIC DISTURBANCE, MOOD DISTURBANCE, AND ANXIETY: ICD-10-CM

## 2021-08-09 LAB
ANION GAP SERPL CALC-SCNC: 3 MMOL/L — LOW (ref 5–17)
BUN SERPL-MCNC: 23 MG/DL — HIGH (ref 7–18)
CALCIUM SERPL-MCNC: 8.4 MG/DL — SIGNIFICANT CHANGE UP (ref 8.4–10.5)
CHLORIDE SERPL-SCNC: 113 MMOL/L — HIGH (ref 96–108)
CO2 SERPL-SCNC: 28 MMOL/L — SIGNIFICANT CHANGE UP (ref 22–31)
CREAT SERPL-MCNC: 0.95 MG/DL — SIGNIFICANT CHANGE UP (ref 0.5–1.3)
CULTURE RESULTS: NO GROWTH — SIGNIFICANT CHANGE UP
GLUCOSE BLDC GLUCOMTR-MCNC: 104 MG/DL — HIGH (ref 70–99)
GLUCOSE BLDC GLUCOMTR-MCNC: 125 MG/DL — HIGH (ref 70–99)
GLUCOSE BLDC GLUCOMTR-MCNC: 89 MG/DL — SIGNIFICANT CHANGE UP (ref 70–99)
GLUCOSE BLDC GLUCOMTR-MCNC: 94 MG/DL — SIGNIFICANT CHANGE UP (ref 70–99)
GLUCOSE SERPL-MCNC: 74 MG/DL — SIGNIFICANT CHANGE UP (ref 70–99)
HCT VFR BLD CALC: 23.5 % — LOW (ref 34.5–45)
HGB BLD-MCNC: 7.3 G/DL — LOW (ref 11.5–15.5)
MAGNESIUM SERPL-MCNC: 2.2 MG/DL — SIGNIFICANT CHANGE UP (ref 1.6–2.6)
MCHC RBC-ENTMCNC: 26.7 PG — LOW (ref 27–34)
MCHC RBC-ENTMCNC: 31.1 GM/DL — LOW (ref 32–36)
MCV RBC AUTO: 86.1 FL — SIGNIFICANT CHANGE UP (ref 80–100)
NRBC # BLD: 0 /100 WBCS — SIGNIFICANT CHANGE UP (ref 0–0)
PHOSPHATE SERPL-MCNC: 3 MG/DL — SIGNIFICANT CHANGE UP (ref 2.5–4.5)
PLATELET # BLD AUTO: 251 K/UL — SIGNIFICANT CHANGE UP (ref 150–400)
POTASSIUM SERPL-MCNC: 4.5 MMOL/L — SIGNIFICANT CHANGE UP (ref 3.5–5.3)
POTASSIUM SERPL-SCNC: 4.5 MMOL/L — SIGNIFICANT CHANGE UP (ref 3.5–5.3)
RBC # BLD: 2.73 M/UL — LOW (ref 3.8–5.2)
RBC # FLD: 15.8 % — HIGH (ref 10.3–14.5)
SODIUM SERPL-SCNC: 144 MMOL/L — SIGNIFICANT CHANGE UP (ref 135–145)
SPECIMEN SOURCE: SIGNIFICANT CHANGE UP
WBC # BLD: 6.09 K/UL — SIGNIFICANT CHANGE UP (ref 3.8–10.5)
WBC # FLD AUTO: 6.09 K/UL — SIGNIFICANT CHANGE UP (ref 3.8–10.5)

## 2021-08-09 PROCEDURE — 95819 EEG AWAKE AND ASLEEP: CPT | Mod: 26

## 2021-08-09 RX ORDER — SODIUM CHLORIDE 9 MG/ML
1000 INJECTION, SOLUTION INTRAVENOUS
Refills: 0 | Status: DISCONTINUED | OUTPATIENT
Start: 2021-08-09 | End: 2021-08-11

## 2021-08-09 RX ADMIN — Medication 1 MILLIGRAM(S): at 11:49

## 2021-08-09 RX ADMIN — RISPERIDONE 0.5 MILLIGRAM(S): 4 TABLET ORAL at 17:06

## 2021-08-09 RX ADMIN — SODIUM CHLORIDE 50 MILLILITER(S): 9 INJECTION, SOLUTION INTRAVENOUS at 21:23

## 2021-08-09 RX ADMIN — Medication 81 MILLIGRAM(S): at 11:49

## 2021-08-09 RX ADMIN — ENOXAPARIN SODIUM 40 MILLIGRAM(S): 100 INJECTION SUBCUTANEOUS at 11:34

## 2021-08-09 RX ADMIN — SODIUM CHLORIDE 50 MILLILITER(S): 9 INJECTION, SOLUTION INTRAVENOUS at 05:54

## 2021-08-09 RX ADMIN — MIRTAZAPINE 15 MILLIGRAM(S): 45 TABLET, ORALLY DISINTEGRATING ORAL at 21:23

## 2021-08-09 RX ADMIN — CITALOPRAM 10 MILLIGRAM(S): 10 TABLET, FILM COATED ORAL at 11:49

## 2021-08-09 RX ADMIN — ATORVASTATIN CALCIUM 40 MILLIGRAM(S): 80 TABLET, FILM COATED ORAL at 21:22

## 2021-08-09 NOTE — PROGRESS NOTE ADULT - PROBLEM SELECTOR PLAN 4
- Patient has hx of MS, no acute intervention  - C/w home meds - On admission, patient with hg 11, no signs of active bleeding   - As per HCP, no dark black tarry stool, no hx NSAIDs abuse, no episodes of BRBPR (low concern of GI bleed), no hematemesis    - Hemodynamically stable, IV access   - Type and Screen, Transfuse if Hg <7  - mild iron deficiency   - FOBT negative   - Monitor CBC daily

## 2021-08-09 NOTE — PROGRESS NOTE ADULT - PROBLEM SELECTOR PLAN 2
- Patient with new onset of seizures, post ictal period unknown   - On exam unable to assess for neuro deficits as patient does not follow commands and is contracted  - Differentials Stroke/TIA vs Hypoglycemia   - Focal (impaired or retained awareness) vs Generalized  - EKG showed NSR, no acute changes   - Consider EEG to r/o seizure  - Seizure/aspiration/fall precautions  - Neuro Dr Davis consulted - Patient with new onset of seizures, post ictal period unknown, s/p Ativan x1 in ED    - Differentials Stroke/TIA vs Hypoglycemia   - Focal (impaired or retained awareness) vs Generalized  - EKG showed NSR, no acute changes   - Neuro consulted-Dr. Davis  - check MR brain + MRA head/neck  - EEG pending

## 2021-08-09 NOTE — PROGRESS NOTE ADULT - SUBJECTIVE AND OBJECTIVE BOX
Patient is a 80y old  Female who presents with a chief complaint of ACUTE ENCEPHALOPATHY (09 Aug 2021 14:17)      INTERVAL HPI/OVERNIGHT EVENTS:  no overnight events  Patient confused. speech nonsensical     Spoke to home caregiver- re: MRI to confirm to implantable device and metal objects and to provide update     REVIEW OF SYSTEMS:  unable to obtain due to AMS      T(C): 36.6 (08-09-21 @ 12:05), Max: 36.7 (08-08-21 @ 22:15)  HR: 82 (08-09-21 @ 12:05) (76 - 82)  BP: 146/57 (08-09-21 @ 12:05) (146/57 - 155/70)  RR: 15 (08-09-21 @ 12:05) (15 - 18)  SpO2: 100% (08-09-21 @ 12:05) (99% - 100%)  Wt(kg): --Vital Signs Last 24 Hrs  T(C): 36.6 (09 Aug 2021 12:05), Max: 36.7 (08 Aug 2021 22:15)  T(F): 97.8 (09 Aug 2021 12:05), Max: 98.1 (08 Aug 2021 22:15)  HR: 82 (09 Aug 2021 12:05) (76 - 82)  BP: 146/57 (09 Aug 2021 12:05) (146/57 - 155/70)  BP(mean): --  RR: 15 (09 Aug 2021 12:05) (15 - 18)  SpO2: 100% (09 Aug 2021 12:05) (99% - 100%)    PHYSICAL EXAM:  GENERAL: NAD, well-groomed, well-developed  HEAD:  Atraumatic, Normocephalic  EYES: EOMI, PERRLA, conjunctiva and sclera clear  ENMT: No tonsillar erythema, exudates, or enlargement; Moist mucous membranes, Good dentition, No lesions  NECK: Supple, No JVD, Normal thyroid  NERVOUS SYSTEM:  Alert & Oriented X3, Good concentration; Motor Strength 5/5 B/L upper and lower extremities; DTRs 2+ intact and symmetric  CHEST/LUNG: Clear to percussion bilaterally; No rales, rhonchi, wheezing, or rubs  HEART: Regular rate and rhythm; No murmurs, rubs, or gallops  ABDOMEN: Soft, Nontender, Nondistended; Bowel sounds present  EXTREMITIES:  2+ Peripheral Pulses, No clubbing, cyanosis, or edema  LYMPH: No lymphadenopathy noted  SKIN: No rashes or lesions    Consultant(s) Notes Reviewed:  [x ] YES  [ ] NO  Care Discussed with Consultants/Other Providers [ x] YES  [ ] NO    LABS:                          7.3    6.09  )-----------( 251      ( 09 Aug 2021 06:23 )             23.5   08-09    144  |  113<H>  |  23<H>  ----------------------------<  74  4.5   |  28  |  0.95    Ca    8.4      09 Aug 2021 06:23  Phos  3.0     08-09  Mg     2.2     08-09          RADIOLOGY & ADDITIONAL TESTS:    Imaging Personally Reviewed:  [ ] YES  [ ] NO  aspirin  chewable 81 milliGRAM(s) Oral daily  atorvastatin 40 milliGRAM(s) Oral at bedtime  citalopram 10 milliGRAM(s) Oral daily  dextrose 5% + sodium chloride 0.9%. 1000 milliLiter(s) IV Continuous <Continuous>  dextrose 5% + sodium chloride 0.9%. 1000 milliLiter(s) IV Continuous <Continuous>  enoxaparin Injectable 40 milliGRAM(s) SubCutaneous daily  folic acid 1 milliGRAM(s) Oral daily  insulin lispro (ADMELOG) corrective regimen sliding scale   SubCutaneous every 6 hours  levothyroxine 25 MICROGram(s) Oral daily  mirtazapine 15 milliGRAM(s) Oral at bedtime  risperiDONE   Tablet 0.5 milliGRAM(s) Oral two times a day      HEALTH ISSUES - PROBLEM Dx:  Encephalopathy acute  Encephalopathy acute    Seizure  Seizure    MS (multiple sclerosis)  MS (multiple sclerosis)    Diabetes mellitus  Diabetes mellitus    Hypertension  Hypertension    Hypothyroidism  Hypothyroidism    Chronic kidney disease, unspecified CKD stage  Chronic kidney disease, unspecified CKD stage    Prophylactic measure  Prophylactic measure    Anemia  Anemia           Patient is a 80y old  Female who presents with a chief complaint of ACUTE ENCEPHALOPATHY (09 Aug 2021 14:17)      INTERVAL HPI/OVERNIGHT EVENTS:  no overnight events  Patient confused. speech nonsensical     Spoke to home caregiver- re: MRI to confirm to implantable device and metal objects and to provide update     REVIEW OF SYSTEMS:  unable to obtain due to AMS      Vital Signs Last 24 Hrs  T(C): 36.6 (09 Aug 2021 12:05), Max: 36.7 (08 Aug 2021 22:15)  T(F): 97.8 (09 Aug 2021 12:05), Max: 98.1 (08 Aug 2021 22:15)  HR: 82 (09 Aug 2021 12:05) (76 - 82)  BP: 146/57 (09 Aug 2021 12:05) (146/57 - 155/70)  RR: 15 (09 Aug 2021 12:05) (15 - 18)  SpO2: 100% (09 Aug 2021 12:05) (99% - 100%)    PHYSICAL EXAM:  GENERAL: NAD, well-groomed, well-developed  HEAD:  Atraumatic, Normocephalic  EYES: EOMI, PERRLA, conjunctiva and sclera clear  ENMT: No tonsillar erythema, exudates, or enlargement; Moist mucous membranes, Good dentition, No lesions  NECK: Supple, No JVD, Normal thyroid  NERVOUS SYSTEM:  Alert & Oriented X1  CHEST/LUNG: Clear to percussion bilaterally; No rales, rhonchi, wheezing, or rubs  HEART: Regular rate and rhythm; No murmurs, rubs, or gallops  ABDOMEN: Soft, Nontender, Nondistended; Bowel sounds present  EXTREMITIES:  2+ Peripheral Pulses, No clubbing, cyanosis, or edema  SKIN: No rashes or lesions    Consultant(s) Notes Reviewed:  [x ] YES  [ ] NO  Care Discussed with Consultants/Other Providers [ x] YES  [ ] NO    LABS:                          7.3    6.09  )-----------( 251      ( 09 Aug 2021 06:23 )             23.5   08-09    144  |  113<H>  |  23<H>  ----------------------------<  74  4.5   |  28  |  0.95    Ca    8.4      09 Aug 2021 06:23  Phos  3.0     08-09  Mg     2.2     08-09          RADIOLOGY & ADDITIONAL TESTS:    Imaging Personally Reviewed:  [ ] YES  [ ] NO  aspirin  chewable 81 milliGRAM(s) Oral daily  atorvastatin 40 milliGRAM(s) Oral at bedtime  citalopram 10 milliGRAM(s) Oral daily  dextrose 5% + sodium chloride 0.9%. 1000 milliLiter(s) IV Continuous <Continuous>  dextrose 5% + sodium chloride 0.9%. 1000 milliLiter(s) IV Continuous <Continuous>  enoxaparin Injectable 40 milliGRAM(s) SubCutaneous daily  folic acid 1 milliGRAM(s) Oral daily  insulin lispro (ADMELOG) corrective regimen sliding scale   SubCutaneous every 6 hours  levothyroxine 25 MICROGram(s) Oral daily  mirtazapine 15 milliGRAM(s) Oral at bedtime  risperiDONE   Tablet 0.5 milliGRAM(s) Oral two times a day      HEALTH ISSUES - PROBLEM Dx:  Encephalopathy acute  Encephalopathy acute    Seizure  Seizure    MS (multiple sclerosis)  MS (multiple sclerosis)    Diabetes mellitus  Diabetes mellitus    Hypertension  Hypertension    Hypothyroidism  Hypothyroidism    Chronic kidney disease, unspecified CKD stage  Chronic kidney disease, unspecified CKD stage    Prophylactic measure  Prophylactic measure    Anemia  Anemia

## 2021-08-09 NOTE — SWALLOW BEDSIDE ASSESSMENT ADULT - SLP PERTINENT HISTORY OF CURRENT PROBLEM
Pt is 81 y/o female from home, bedbound, lives with HCP Danny ([phone]) who takes care of patient, has past medical hx of severe dementia, MS, HLD, hypertension, DM, hypothyroidism, stroke with left sided residual weakness?, CKD, chronic [place], was BIBEMS as patient was having shacking movements at home, making "funny faces" and not being herself. As per Ms Danny, patient was not complaining of any symptoms, no fevers, no sick contacts.  In ED patient was code Stroke and had a seizure episode s/p ativan x1.

## 2021-08-09 NOTE — SWALLOW BEDSIDE ASSESSMENT ADULT - SWALLOW EVAL: DIAGNOSIS
Pt p/w oropharyngeal dysphagia c/b impaired bolus formation, increased oral transit time, and reduced laryngeal elevation upon palpation on trials of puree and nectar thick liquids. Audible gulp swallow on one trial of thin liquids; pt refused further trials. Pt spit out trials of mechanical soft. Behavioral components involved on feeding process.

## 2021-08-09 NOTE — EEG REPORT - NS EEG TEXT BOX
REPORT OF ROUTINE EEG WITH VIDEO  [facility]: [address], [address], [place] [address], Phone: [phone] [place]: [address], [place] [address], Phone: [phone] Office: [address] [place] [address], [facility] [address], [place] [address], Phone: [phone]  Patient Name: GAVIN WALKER   Age: 80 year : 1940 MRN #: [id number], Location: [address] Referring Physician: FLAQUITO CALERO  EEG #: [id number] Study Date: 2021   Start Time: 1:03:23 PM    Study Duration: 30.5 		  Technical Information:					 On Instrument: - Placement and Labeling of Electrodes: The EEG was performed utilizing 20 channels referential EEG connections (coronal over temporal over parasagittal montage) using all standard 10-20 electrode placements with EKG.  Recording was at a sampling rate of 256 samples per second per channel.  Time synchronized digital video recording was done simultaneously with EEG recording.  A low light infrared camera was used for low light recording.  Emanuel and seizure detection algorithms were utilized. CSA Technical Component: Quantitative EEG analysis using a separate Compressed Spectral Array (CSA) software package was conducted in real-time and run at bedside after set up by the technician, digitally displaying the power of electrographic frequencies included in the 1-30Hz band using a graded color map.  This data was reviewed and interpreted independently, and is reported in a separate section below.  History:  Routine study. Performed bedside Patient awake, confused. Dementia HV not performed and photic performed 79 Y/O female presents with: Dementia CVA Encephalopathy acute Concern for seizures  Medication No Data.  Study Interpretation:  FINDINGS:  The background was continuous, spontaneously variable and reactive.  During wakefulness, the posteriorly dominant rhythm consisted of symmetric, well-modulated 8.5 Hz activity, with an amplitude to 30 uV, that attenuated to eye opening.  Low amplitude central beta was noted in wakefulness.  Background Slowing: Generalized slowing: Continuous diffuse mostly polymorphic theta activity Focal slowing: none was present.  Sleep Background: Stage II sleep transients were not recorded.  Epileptiform Activity:  No epileptiform discharges were present.  Events: No clinical events were recorded. No seizures were recorded.  Activation Procedures:  -Hyperventilation was not performed.    -Photic stimulation was performed and did not elicit any abnormalities.    Photic driving response was noted intermittently.  Artifacts: Intermittent myogenic and movement artifacts were noted.  ECG: The heart rate on single channel ECG was predominantly between 60-70 BPM.  EEG Classification / Summary:  Abnormal EEG in the awake, drowsy and asleep states. Generalized background slowing  Clinical Impression:  This is indicative of mild nonspecific diffuse or multifocal cerebral dysfunction.  No epileptiform pattern or seizure seen.  *** PRELIMINARY REPORT - PENDING EPILEPSY ATTENDING REVIEW *** ________________________________________  Matt Thorne MD, KENN Fellow, [facility] ------------------------------------ EEG Reading Room: [phone] On Call Service After Hours: [phone]  		     REPORT OF ROUTINE EEG WITH VIDEO  [facility]: [address], [address], [place] [address], Phone: [phone] [facility]: [address] [place], [place] [address], [place] [address], Phone: [phone] Office: [address] [place] [address], [facility] [address], [place] [address], Phone: [phone]  Patient Name: GAVIN WALKER   Age: 80 year : 1940 MRN #: [id number], Location: [address] Referring Physician: FLAQUITO CALERO  EEG #: [id number] Study Date: 2021   Start Time: 1:03:23 PM    Study Duration: 30.5 		  Technical Information:					 On Instrument: - Placement and Labeling of Electrodes: The EEG was performed utilizing 20 channels referential EEG connections (coronal over temporal over parasagittal montage) using all standard 10-20 electrode placements with EKG.  Recording was at a sampling rate of 256 samples per second per channel.  Time synchronized digital video recording was done simultaneously with EEG recording.  A low light infrared camera was used for low light recording.  Emanuel and seizure detection algorithms were utilized. CSA Technical Component: Quantitative EEG analysis using a separate Compressed Spectral Array (CSA) software package was conducted in real-time and run at bedside after set up by the technician, digitally displaying the power of electrographic frequencies included in the 1-30Hz band using a graded color map.  This data was reviewed and interpreted independently, and is reported in a separate section below.  History:  Routine study. Performed bedside Patient awake, confused. Dementia HV not performed and photic performed 79 Y/O female presents with: Dementia CVA Encephalopathy acute Concern for seizures  Medication No Data.  Study Interpretation:  FINDINGS:  The background was continuous, spontaneously variable and reactive.  During wakefulness, the posteriorly dominant rhythm consisted of symmetric, well-modulated 7.5-8Hz activity, with an amplitude to 30 uV, that attenuated to eye opening.  Low amplitude central beta was noted in wakefulness.  Background Slowing: Generalized slowing: Continuous diffuse mostly polymorphic theta activity, as well as posterior dominant slowing Focal slowing: none was present.  Sleep Background: Stage II sleep transients were not recorded.  Epileptiform Activity:  No epileptiform discharges were present.  Events: No clinical events were recorded. No seizures were recorded.  Activation Procedures:  -Hyperventilation was not performed.    -Photic stimulation was performed and did not elicit any abnormalities.    Photic driving response was noted intermittently.  Artifacts: Intermittent myogenic and movement artifacts were noted.  ECG: The heart rate on single channel ECG was predominantly between 60-70 BPM.  EEG Classification / Summary:  Abnormal EEG in the awake, drowsy and asleep states. Generalized background slowing  Clinical Impression:  This is indicative of mild nonspecific diffuse or multifocal cerebral dysfunction.  No epileptiform pattern or seizure seen.  *** PRELIMINARY REPORT - PENDING EPILEPSY ATTENDING REVIEW *** ________________________________________  Matt Thorne MD, KENN Fellow, [facility] ------------------------------------ EEG Reading Room: [phone] On Call Service After Hours: [phone]  		     REPORT OF ROUTINE EEG WITH VIDEO  [facility]: [address], [address] [place], [place] [address], Phone: [phone] [facility]: [address] [place], [place] [address], [place] [address], Phone: [phone] Office: [address] [place] [address], [facility] [address], [place] [address], Phone: [phone]  Patient Name: GAVIN WALKER   Age: 80 year : 1940 MRN #: [id number], Location: [address] Referring Physician: FLAQUITO CALERO  EEG #: [id number] Study Date: 2021   Start Time: 1:03:23 PM    Study Duration: 30.5 		  Technical Information:					 On Instrument: - Placement and Labeling of Electrodes: The EEG was performed utilizing 20 channels referential EEG connections (coronal over temporal over parasagittal montage) using all standard 10-20 electrode placements with EKG.  Recording was at a sampling rate of 256 samples per second per channel.  Time synchronized digital video recording was done simultaneously with EEG recording.  A low light infrared camera was used for low light recording.  Emanuel and seizure detection algorithms were utilized. CSA Technical Component: Quantitative EEG analysis using a separate Compressed Spectral Array (CSA) software package was conducted in real-time and run at bedside after set up by the technician, digitally displaying the power of electrographic frequencies included in the 1-30Hz band using a graded color map.  This data was reviewed and interpreted independently, and is reported in a separate section below.  History:  Routine study. Performed bedside Patient awake, confused. Dementia HV not performed and photic performed 81 Y/O female presents with: Dementia CVA Encephalopathy acute Concern for seizures  Medication No Data.  Study Interpretation:  FINDINGS:  The background was continuous, spontaneously variable and reactive.  During wakefulness, the posteriorly dominant rhythm consisted of symmetric, well-modulated 7.5-8Hz activity, with an amplitude to 30 uV, that attenuated to eye opening.  Low amplitude central beta was noted in wakefulness.  Background Slowing: Generalized slowing: Continuous diffuse mostly polymorphic theta activity, as well as posterior dominant slowing Focal slowing: none was present.  Sleep Background: Stage II sleep transients were not recorded.  Epileptiform Activity:  No epileptiform discharges were present.  Events: No clinical events were recorded. No seizures were recorded.  Activation Procedures:  -Hyperventilation was not performed.    -Photic stimulation was performed and did not elicit any abnormalities.    Photic driving response was noted intermittently.  Artifacts: Intermittent myogenic and movement artifacts were noted.  ECG: The heart rate on single channel ECG was predominantly between 60-70 BPM.  EEG Classification / Summary:  Abnormal EEG in an encephalopathic patient Generalized background slowing  Clinical Impression:  This is indicative of mild nonspecific diffuse or multifocal cerebral dysfunction.  No epileptiform pattern or seizure seen.  *** PRELIMINARY REPORT - PENDING EPILEPSY ATTENDING REVIEW *** ________________________________________  Matt Thorne MD, KENN Fellow, [facility] ------------------------------------ EEG Reading Room: [phone] On Call Service After Hours: [phone]  		     REPORT OF ROUTINE EEG WITH VIDEO  [facility]: [address], [address], [place] [address], Phone: [phone] [facility]: [address] [place], [place] [address], [place] [address], Phone: [phone] Office: [address] [place] [address], [facility] [address], [place] [address], Phone: [phone]  Patient Name: GAVIN WALKER   Age: 80 year : 1940 MRN #: [id number], Location: [address] Referring Physician: FLAQUITO CALERO  EEG #: [id number] Study Date: 2021   Start Time: 1:03:23 PM    Study Duration: 30.5 		  Technical Information:					 On Instrument: - Placement and Labeling of Electrodes: The EEG was performed utilizing 20 channels referential EEG connections (coronal over temporal over parasagittal montage) using all standard 10-20 electrode placements with EKG.  Recording was at a sampling rate of 256 samples per second per channel.  Time synchronized digital video recording was done simultaneously with EEG recording.  A low light infrared camera was used for low light recording.  Emanuel and seizure detection algorithms were utilized. CSA Technical Component: Quantitative EEG analysis using a separate Compressed Spectral Array (CSA) software package was conducted in real-time and run at bedside after set up by the technician, digitally displaying the power of electrographic frequencies included in the 1-30Hz band using a graded color map.  This data was reviewed and interpreted independently, and is reported in a separate section below.  History:  Routine study. Performed bedside Patient awake, confused. Dementia HV not performed and photic performed 79 Y/O female presents with: Dementia CVA Encephalopathy acute Concern for seizures  Medication No Data.  Study Interpretation:  FINDINGS:  The background was continuous, spontaneously variable and reactive.  During wakefulness, the posteriorly dominant rhythm consisted of symmetric, well-modulated 7.5-8Hz activity, with an amplitude to 30 uV, that attenuated to eye opening.  Low amplitude central beta was noted in wakefulness.  Background Slowing: Generalized slowing: Continuous diffuse mostly polymorphic theta activity, as well as posterior dominant slowing Focal slowing: none was present.  Sleep Background: Stage II sleep transients were not recorded.  Epileptiform Activity:  No epileptiform discharges were present.  Events: No clinical events were recorded. No seizures were recorded.  Activation Procedures:  -Hyperventilation was not performed.    -Photic stimulation was performed and did not elicit any abnormalities.    Photic driving response was noted intermittently.  Artifacts: Intermittent myogenic and movement artifacts were noted.  ECG: The heart rate on single channel ECG was predominantly between 60-70 BPM.  EEG Classification / Summary:  Abnormal EEG in an encephalopathic patient background slowing, generalized   Clinical Impression:  This is indicative of mild nonspecific diffuse or multifocal cerebral dysfunction.  No epileptiform pattern or seizure seen.  ________________________________________  Mattsamantha Thorne MD, KENN Fellow, [facility]  Dwayne Palomares MD PhD Director, Epilepsy Division, [facility] EEG Reading Room Ph#: [phone] Epilepsy Answering Service after 5PM and before 8:30AM: Ph#: [phone]

## 2021-08-09 NOTE — PROGRESS NOTE ADULT - PROBLEM SELECTOR PLAN 3
- On admission, patient with hg 11, no signs of active bleeding   - As per HCP, no dark black tarry stool, no hx NSAIDs abuse, no episodes of BRBPR (low concern of GI bleed), no hematemesis    - No Hypotension or any signs of hemorrhagic shock   - Hemodynamically stable, IV access   - Type and Screen, Transfuse if Hg <7  - F/u iron panel in the am   - FOBT negative   - Monitor CBC daily - On admission, patient with hg 11, no signs of active bleeding   - As per HCP, no dark black tarry stool, no hx NSAIDs abuse, no episodes of BRBPR (low concern of GI bleed), no hematemesis    - Hemodynamically stable, IV access   - Type and Screen, Transfuse if Hg <7  - mild iron deficiency   - FOBT negative   - Monitor CBC daily Bed bound at baseline, has caregiver.     -cont home lexapro  -cont home remeron   -cont home risperdal

## 2021-08-09 NOTE — PROGRESS NOTE ADULT - PROBLEM SELECTOR PLAN 5
- Patient takes not on any meds at home  - Started on HSS  - FS q 6 hrs while NPO   - When feasible to start diet (DASH diet with consistent carb), do fingerstick before meals and at bedtime  - Target -180  - F/u A1c - Patient takes not on any meds at home  - SSI  - Target -180  - A1c 5.4% - Patient has hx of MS, no acute intervention  - C/w home meds

## 2021-08-09 NOTE — SWALLOW BEDSIDE ASSESSMENT ADULT - CONSISTENCIES ADMINISTERED
ice chips applesauce, x5 tsp/puree octavia crackers + applesauce, x1 tsp/mech soft water, x1 tsp/thin liquid water, x4 cup sips/nectar thick

## 2021-08-09 NOTE — PROGRESS NOTE ADULT - PROBLEM SELECTOR PLAN 1
- Patient admitted for acute encephalopathy   - Stoke vs Infectious vs seizures vs worsening dementia  - CXR unremarkable  - CTH and CTA head and neck showed stable chronic infarct left cerebellum and microvascular ischemic changes. Focal narrowing at origin of left vertebral artery. Narrowing supraclinoid internal carotid arteries   - NPO for now  - Neuro checks q4 hours, Monitor for mental status changes   - Fall precautions/Aspiration precautions/Seizure precautions  - Will start on Aspirin and Statins, hold Plavix until neuro recommendations   - Will hold antibiotics for now as no signs of infection    - F/u UA, Ucx to r/o infection, start Abx if febrile or any findings on labs   - Consider EEG to r/o seizure  - Neurology Dr Davis Consulted  - Palliative consult in the am  - Vascular surgery consult in the am - Patient admitted for acute encephalopathy   - Stroke vs Infectious vs seizures vs worsening dementia  - CXR unremarkable  - CTH and CTA head and neck showed stable chronic infarct left cerebellum and microvascular ischemic changes. Focal narrowing at origin of left vertebral artery. Narrowing supraclinoid internal carotid arteries   -SLP--> recommend pureed with thickened liquids  - Neuro checks q4 hours, Monitor for mental status changes   - Fall precautions/Aspiration precautions/Seizure precautions  - cont Aspirin and Statins, holding Plavix until neuro recommendations   - EEG to r/o seizure, ordered MR/MRA brain/head and neck   - Neurology Dr Davis Consulted  - Palliative consulted   - Vascular surgery consult in the am

## 2021-08-09 NOTE — SWALLOW BEDSIDE ASSESSMENT ADULT - DIET PRIOR TO ADMI
Unknown; pt known by this service, seen on February 2018, recommended puree diet and nectar thick liquids

## 2021-08-09 NOTE — SWALLOW BEDSIDE ASSESSMENT ADULT - COMMENTS
Pt awake, confused, not responsive to queries, HOB elevated to 90°. Pt initially on RA; vitals checked HR 80bpm, satO2 84%; RN Amelia informed, n/c replaced 2L/min O2 and satO2 96%. Pt spit out the trial and refused further ones Pt refused further trials of thin liquids

## 2021-08-09 NOTE — PROGRESS NOTE ADULT - PROBLEM SELECTOR PLAN 6
- Patient has history of Hypertension not on any meds at home   - Monitor BP and start meds as needed - Patient takes not on any meds at home  - SSI  - Target -180  - A1c 5.4%

## 2021-08-10 DIAGNOSIS — E44.0 MODERATE PROTEIN-CALORIE MALNUTRITION: ICD-10-CM

## 2021-08-10 DIAGNOSIS — R53.2 FUNCTIONAL QUADRIPLEGIA: ICD-10-CM

## 2021-08-10 DIAGNOSIS — F03.90 UNSPECIFIED DEMENTIA WITHOUT BEHAVIORAL DISTURBANCE: ICD-10-CM

## 2021-08-10 DIAGNOSIS — Z51.5 ENCOUNTER FOR PALLIATIVE CARE: ICD-10-CM

## 2021-08-10 LAB
ANION GAP SERPL CALC-SCNC: 7 MMOL/L — SIGNIFICANT CHANGE UP (ref 5–17)
BUN SERPL-MCNC: 15 MG/DL — SIGNIFICANT CHANGE UP (ref 7–18)
CALCIUM SERPL-MCNC: 8.9 MG/DL — SIGNIFICANT CHANGE UP (ref 8.4–10.5)
CHLORIDE SERPL-SCNC: 109 MMOL/L — HIGH (ref 96–108)
CO2 SERPL-SCNC: 27 MMOL/L — SIGNIFICANT CHANGE UP (ref 22–31)
CREAT SERPL-MCNC: 1.05 MG/DL — SIGNIFICANT CHANGE UP (ref 0.5–1.3)
GLUCOSE BLDC GLUCOMTR-MCNC: 101 MG/DL — HIGH (ref 70–99)
GLUCOSE BLDC GLUCOMTR-MCNC: 103 MG/DL — HIGH (ref 70–99)
GLUCOSE BLDC GLUCOMTR-MCNC: 149 MG/DL — HIGH (ref 70–99)
GLUCOSE BLDC GLUCOMTR-MCNC: 88 MG/DL — SIGNIFICANT CHANGE UP (ref 70–99)
GLUCOSE BLDC GLUCOMTR-MCNC: 90 MG/DL — SIGNIFICANT CHANGE UP (ref 70–99)
GLUCOSE BLDC GLUCOMTR-MCNC: 92 MG/DL — SIGNIFICANT CHANGE UP (ref 70–99)
GLUCOSE BLDC GLUCOMTR-MCNC: 92 MG/DL — SIGNIFICANT CHANGE UP (ref 70–99)
GLUCOSE SERPL-MCNC: 133 MG/DL — HIGH (ref 70–99)
HCT VFR BLD CALC: 25.7 % — LOW (ref 34.5–45)
HGB BLD-MCNC: 8.1 G/DL — LOW (ref 11.5–15.5)
MCHC RBC-ENTMCNC: 27.2 PG — SIGNIFICANT CHANGE UP (ref 27–34)
MCHC RBC-ENTMCNC: 31.5 GM/DL — LOW (ref 32–36)
MCV RBC AUTO: 86.2 FL — SIGNIFICANT CHANGE UP (ref 80–100)
NRBC # BLD: 0 /100 WBCS — SIGNIFICANT CHANGE UP (ref 0–0)
PLATELET # BLD AUTO: 276 K/UL — SIGNIFICANT CHANGE UP (ref 150–400)
POTASSIUM SERPL-MCNC: 4.2 MMOL/L — SIGNIFICANT CHANGE UP (ref 3.5–5.3)
POTASSIUM SERPL-SCNC: 4.2 MMOL/L — SIGNIFICANT CHANGE UP (ref 3.5–5.3)
RBC # BLD: 2.98 M/UL — LOW (ref 3.8–5.2)
RBC # FLD: 15.8 % — HIGH (ref 10.3–14.5)
SODIUM SERPL-SCNC: 143 MMOL/L — SIGNIFICANT CHANGE UP (ref 135–145)
WBC # BLD: 6.78 K/UL — SIGNIFICANT CHANGE UP (ref 3.8–10.5)
WBC # FLD AUTO: 6.78 K/UL — SIGNIFICANT CHANGE UP (ref 3.8–10.5)

## 2021-08-10 PROCEDURE — 99223 1ST HOSP IP/OBS HIGH 75: CPT

## 2021-08-10 RX ORDER — METOPROLOL TARTRATE 50 MG
12.5 TABLET ORAL
Refills: 0 | Status: DISCONTINUED | OUTPATIENT
Start: 2021-08-10 | End: 2021-08-11

## 2021-08-10 RX ORDER — METOPROLOL TARTRATE 50 MG
25 TABLET ORAL DAILY
Refills: 0 | Status: DISCONTINUED | OUTPATIENT
Start: 2021-08-10 | End: 2021-08-10

## 2021-08-10 RX ORDER — METOPROLOL TARTRATE 50 MG
5 TABLET ORAL ONCE
Refills: 0 | Status: COMPLETED | OUTPATIENT
Start: 2021-08-10 | End: 2021-08-10

## 2021-08-10 RX ADMIN — ENOXAPARIN SODIUM 40 MILLIGRAM(S): 100 INJECTION SUBCUTANEOUS at 12:06

## 2021-08-10 RX ADMIN — MIRTAZAPINE 15 MILLIGRAM(S): 45 TABLET, ORALLY DISINTEGRATING ORAL at 22:27

## 2021-08-10 RX ADMIN — CITALOPRAM 10 MILLIGRAM(S): 10 TABLET, FILM COATED ORAL at 12:07

## 2021-08-10 RX ADMIN — Medication 25 MICROGRAM(S): at 05:26

## 2021-08-10 RX ADMIN — Medication 81 MILLIGRAM(S): at 12:07

## 2021-08-10 RX ADMIN — ATORVASTATIN CALCIUM 40 MILLIGRAM(S): 80 TABLET, FILM COATED ORAL at 22:27

## 2021-08-10 RX ADMIN — Medication 1 MILLIGRAM(S): at 12:07

## 2021-08-10 RX ADMIN — Medication 5 MILLIGRAM(S): at 22:27

## 2021-08-10 RX ADMIN — RISPERIDONE 0.5 MILLIGRAM(S): 4 TABLET ORAL at 05:26

## 2021-08-10 RX ADMIN — RISPERIDONE 0.5 MILLIGRAM(S): 4 TABLET ORAL at 17:27

## 2021-08-10 NOTE — PHYSICAL THERAPY INITIAL EVALUATION ADULT - PERTINENT HX OF CURRENT PROBLEM, REHAB EVAL
Pt. was BIBEMS as patient was having shaking movements at home, making "funny faces" and not being herself (as per caregiver). CT head showed no acute intracranial hemorrhage or acute territorial infarct. If acute ischemia is a strong clinical concern, MRI exam was recommended for further evaluation.

## 2021-08-10 NOTE — PROGRESS NOTE ADULT - PROBLEM SELECTOR PLAN 7
- Patient has history of Hypertension not on any meds at home   - Monitor BP and start meds as needed

## 2021-08-10 NOTE — CHART NOTE - NSCHARTNOTEFT_GEN_A_CORE
Assessment:   80yFemalePatient is a 80y old  Female who presents with a chief complaint of ACUTE ENCEPHALOPATHY (10 Aug 2021 12:20)      Factors impacting intake: [ ] none [ ] nausea  [ ] vomiting [ ] diarrhea [ ] constipation  [ ]chewing problems [ ] swallowing issues  [ x] other: Nutrition consult received for this patient, however, comfort care. no nutrition intervention is warranted at this time. Will follow as needed.     Diet Presciption: Diet, Dysphagia 1 Pureed-Nectar Consistency Fluid (08-09-21 @ 11:41)    Intake:     Current Weight: Weight (kg): 63.9 (08-07 @ 22:41)  % Weight Change    Pertinent Medications: MEDICATIONS  (STANDING):  aspirin  chewable 81 milliGRAM(s) Oral daily  atorvastatin 40 milliGRAM(s) Oral at bedtime  citalopram 10 milliGRAM(s) Oral daily  dextrose 5% + sodium chloride 0.9%. 1000 milliLiter(s) (50 mL/Hr) IV Continuous <Continuous>  dextrose 5% + sodium chloride 0.9%. 1000 milliLiter(s) (50 mL/Hr) IV Continuous <Continuous>  enoxaparin Injectable 40 milliGRAM(s) SubCutaneous daily  folic acid 1 milliGRAM(s) Oral daily  insulin lispro (ADMELOG) corrective regimen sliding scale   SubCutaneous every 6 hours  levothyroxine 25 MICROGram(s) Oral daily  mirtazapine 15 milliGRAM(s) Oral at bedtime  risperiDONE   Tablet 0.5 milliGRAM(s) Oral two times a day    MEDICATIONS  (PRN):    Pertinent Labs: 08-10 Na143 mmol/L Glu 133 mg/dL<H> K+ 4.2 mmol/L Cr  1.05 mg/dL BUN 15 mg/dL 08-09 Phos 3.0 mg/dL 08-07 Alb 2.6 g/dL<L> 08-08 Chol 183 mg/dL LDL --    HDL 57 mg/dL Trig 63 mg/dL     CAPILLARY BLOOD GLUCOSE      POCT Blood Glucose.: 149 mg/dL (10 Aug 2021 11:30)  POCT Blood Glucose.: 92 mg/dL (10 Aug 2021 07:59)  POCT Blood Glucose.: 103 mg/dL (10 Aug 2021 06:21)  POCT Blood Glucose.: 90 mg/dL (10 Aug 2021 00:21)  POCT Blood Glucose.: 125 mg/dL (09 Aug 2021 18:01)    Skin:     Estimated Needs:   [ ] no change since previous assessment  [ ] recalculated:     Previous Nutrition Diagnosis:   [ ] Inadequate Energy Intake [ ]Inadequate Oral Intake [ ] Excessive Energy Intake   [ ] Underweight [ ] Increased Nutrient Needs [ ] Overweight/Obesity   [ ] Altered GI Function [ ] Unintended Weight Loss [ ] Food & Nutrition Related Knowledge Deficit [ ] Malnutrition     Nutrition Diagnosis is [ ] ongoing  [ ] resolved [ ] not applicable     New Nutrition Diagnosis: [ ] not applicable       Interventions:   Recommend  [ ] Change Diet To:  [ ] Nutrition Supplement  [ ] Nutrition Support  [ ] Other:     Monitoring and Evaluation:   [ ] PO intake [ x ] Tolerance to diet prescription [ x ] weights [ x ] labs[ x ] follow up per protocol  [ ] other:

## 2021-08-10 NOTE — CHART NOTE - NSCHARTNOTEFT_GEN_A_CORE
EVENT: HR: 110 (10 Aug 2021 20:56) (100 - 116)  BP: 208/79 (10 Aug 2021 20:56) (114/89 - 208/79)    BRIEF HPI: 81 y/o female from home, bedbound, lives with HCP Danny ([phone]) who takes care of patient, has past medical hx of severe dementia, MS, HLD, hypertension, DM, hypothyroidism, stroke with left sided residual weakness?, CKD, chronic Macedo, was BIBEMS as patient was having shacking movements at home, making "funny faces" and not being herself. Patient admitted for Acute encephalopathy and possible seizure. Neurology evaluated. EEG without seizure activity. Palliative consulted, plan for comfort/home hospice at discharge. SW aware of disposition.      OBJECTIVE:  Vital Signs Last 24 Hrs  T(C): 37.4 (10 Aug 2021 20:56), Max: 37.4 (10 Aug 2021 20:56)  T(F): 99.3 (10 Aug 2021 20:56), Max: 99.3 (10 Aug 2021 20:56)  HR: 110 (10 Aug 2021 20:56) (100 - 116)  BP: 208/79 (10 Aug 2021 20:56) (114/89 - 208/79)  BP(mean): --  RR: 19 (10 Aug 2021 20:56) (18 - 19)  SpO2: 98% (10 Aug 2021 20:56) (93% - 98%)    FOCUSED PHYSICAL EXAM:  CV: S1 S2 mild tach  RESP: Even unlabored    LABS:                        8.1    6.78  )-----------( 276      ( 10 Aug 2021 11:42 )             25.7     08-10    143  |  109<H>  |  15  ----------------------------<  133<H>  4.2   |  27  |  1.05    Ca    8.9      10 Aug 2021 11:48  Phos  3.0     08-09  Mg     2.2     08-09    PROBLEM: Elevated BP probably due to inadequate med dosing  PLAN:   1. Metoprolol tartrate Injectable 5 abimbola GRAM(s) IV Push once with parameters  2. Metoprolol 25 mg OD added    FOLLOW UP / RESULT: Trend VS EVENT: HR: 110 (10 Aug 2021 20:56) (100 - 116)  BP: 208/79 (10 Aug 2021 20:56) (114/89 - 208/79)    BRIEF HPI: 81 y/o female from home, bedbound, lives with HCP Danny ([phone]) who takes care of patient, has past medical hx of severe dementia, MS, HLD, hypertension, DM, hypothyroidism, stroke with left sided residual weakness?, CKD, chronic [place], was BIBEMS as patient was having shacking movements at home, making "funny faces" and not being herself. Patient admitted for Acute encephalopathy and possible seizure. Neurology evaluated. EEG without seizure activity. Palliative consulted, plan for comfort/home hospice at discharge. SW aware of disposition.      OBJECTIVE:  Vital Signs Last 24 Hrs  T(C): 37.4 (10 Aug 2021 20:56), Max: 37.4 (10 Aug 2021 20:56)  T(F): 99.3 (10 Aug 2021 20:56), Max: 99.3 (10 Aug 2021 20:56)  HR: 110 (10 Aug 2021 20:56) (100 - 116)  BP: 208/79 (10 Aug 2021 20:56) (114/89 - 208/79)  BP(mean): --  RR: 19 (10 Aug 2021 20:56) (18 - 19)  SpO2: 98% (10 Aug 2021 20:56) (93% - 98%)    FOCUSED PHYSICAL EXAM:  CV: S1 S2 mild tach  RESP: Even unlabored    LABS:                        8.1    6.78  )-----------( 276      ( 10 Aug 2021 11:42 )             25.7     08-10    143  |  109<H>  |  15  ----------------------------<  133<H>  4.2   |  27  |  1.05    Ca    8.9      10 Aug 2021 11:48  Phos  3.0     08-09  Mg     2.2     08-09    PROBLEM: Elevated BP probably due to inadequate med dosing  PLAN:   1. Metoprolol tartrate Injectable 5 abimbola GRAM(s) IV Push once with parameters  2. Metoprolol tartrate 12.5 abimbola GRAM(s) Oral two times a day    FOLLOW UP / RESULT: Trend VS

## 2021-08-10 NOTE — CONSULT NOTE ADULT - PROBLEM SELECTOR RECOMMENDATION 9
w/ seizures.  Pt is AOx1.  Confused.  Bedbound. Dependent.  C/w Celexa, Remeron, and Risperdal.  FAST 7c.  Pt is appropriate for hospice.     Discussed dementia trajectory and provided anticipatory guidance.    Educated pt's HCP Trini and her neighbor Radha Espinoza about hospice philosophy and location of care.    HCP is agreeable for comfort measures/ hospice at home with [facility].  SW referral made.    Pt is DNR/DNI

## 2021-08-10 NOTE — ADVANCED PRACTICE NURSE CONSULT - RECOMMEDATIONS
-Clean all wounds with normal saline and apply skin prep to the surrounding skin  -Apply a Foam dressing to the Coccyx Q 72hrs PRN  -Elevate/float the patients heels using heel protectors and reposition the patient Q 2hrs using wedges or pillows

## 2021-08-10 NOTE — PHYSICAL THERAPY INITIAL EVALUATION ADULT - GENERAL OBSERVATIONS, REHAB EVAL
Pt. received supine in bed, NAD, has mcfadden catheter. She is confused and unable to follow verbal commands. Pt. unable to participate in evaluation due to cognition. Pt. A&O x 1 (only knows her first name).

## 2021-08-10 NOTE — CONSULT NOTE ADULT - ATTENDING COMMENTS
81y f with advanced dementia, bedbound, adm from home for shaking movements. CT shows chronic infarct. Pt nonoriented, confused, mumbling, NAD. Hospice eligible. HCP in agreement for home hospice referral. DNR/DNI, no feeding tubes.

## 2021-08-10 NOTE — CONSULT NOTE ADULT - CONVERSATION DETAILS
Palliative care team spoke with the pt's HCP Trini Willams, she added Mrs. Radha Espinoza (neighbor) to the call as she assists with decision making.  Discussed pt current clinical condition and overall poor prognosis.  Explained pt is appropriate for home hospice.  Educated hospice philosophy and locations of care.  They expressed base on pt's living will, hospice at home is appropriate.  SW referral made.    Reviewed MOLST; DNR/DNI/no feeding tube. Comfort measures/DNH.  No further labs.    All questions answered.  Support provided.  Plan discussed with the primary team and SW

## 2021-08-10 NOTE — CONSULT NOTE ADULT - PROBLEM SELECTOR RECOMMENDATION 2
Clinical evidence indicates that the patient has moderate  protein calorie malnutrition.  Albumin 2.6.      In context of     Acute Illness/Injury (>7days)    vs    Chronic Illness (>1 month)    Energy/Food intake <70% of estimated energy requirement >5 days  Body Fat loss: Severe   Cachexia, temporal wasting, contracted, muscle atrophy  Muscle mass loss: Severe, W/   Skin failure/pressure ulcers   Strength: weakened severe bedbound      pleasure feeds as tolerated - aspiration precautions, careful hand-feeding    No feeding tube

## 2021-08-10 NOTE — PROGRESS NOTE ADULT - SUBJECTIVE AND OBJECTIVE BOX
Patient is a 80y old  Female who presents with a chief complaint of ACUTE ENCEPHALOPATHY (09 Aug 2021 14:17)      INTERVAL HPI/OVERNIGHT EVENTS:  no overnight events  Patient confused. speech nonsensical   Seen by Palliative to discuss GOC-plan for comfort/home hospice at discharge. No further testing at this time.   Plan for discharge home with caregiver/home hospice 8/11     REVIEW OF SYSTEMS:  unable to obtain due to AMS      Vital Signs Last 24 Hrs  T(C): 36.5 (10 Aug 2021 13:10), Max: 36.6 (09 Aug 2021 20:02)  T(F): 97.7 (10 Aug 2021 13:10), Max: 97.9 (09 Aug 2021 20:02)  HR: 116 (10 Aug 2021 13:10) (85 - 116)  BP: 114/89 (10 Aug 2021 13:10) (114/89 - 199/63)  BP(mean): --  RR: 18 (10 Aug 2021 13:10) (18 - 19)  SpO2: 98% (10 Aug 2021 13:10) (93% - 98%)    PHYSICAL EXAM:  GENERAL: NAD  HEAD: Normocephalic  NERVOUS SYSTEM:  Alert  CHEST/LUNG: Clear to percussion bilaterally; No rales, rhonchi, wheezing, or rubs  HEART: Regular rate and rhythm; No murmurs, rubs, or gallops  ABDOMEN: Soft, Nontender, Nondistended; Bowel sounds present  EXTREMITIES:  2+ Peripheral Pulses, No clubbing, cyanosis, or edema  SKIN: No rashes or lesions    Consultant(s) Notes Reviewed:  [x ] YES  [ ] NO  Care Discussed with Consultants/Other Providers [ x] YES  [ ] NO    LABS:                                             8.1    6.78  )-----------( 276      ( 10 Aug 2021 11:42 )             25.7   08-10    143  |  109<H>  |  15  ----------------------------<  133<H>  4.2   |  27  |  1.05    Ca    8.9      10 Aug 2021 11:48  Phos  3.0     08-09  Mg     2.2     08-09      MEDICATIONS  (STANDING):  aspirin  chewable 81 milliGRAM(s) Oral daily  atorvastatin 40 milliGRAM(s) Oral at bedtime  citalopram 10 milliGRAM(s) Oral daily  dextrose 5% + sodium chloride 0.9%. 1000 milliLiter(s) (50 mL/Hr) IV Continuous <Continuous>  dextrose 5% + sodium chloride 0.9%. 1000 milliLiter(s) (50 mL/Hr) IV Continuous <Continuous>  enoxaparin Injectable 40 milliGRAM(s) SubCutaneous daily  folic acid 1 milliGRAM(s) Oral daily  insulin lispro (ADMELOG) corrective regimen sliding scale   SubCutaneous every 6 hours  levothyroxine 25 MICROGram(s) Oral daily  mirtazapine 15 milliGRAM(s) Oral at bedtime  risperiDONE   Tablet 0.5 milliGRAM(s) Oral two times a day    MEDICATIONS  (PRN):  none        HEALTH ISSUES - PROBLEM Dx:  Encephalopathy acute  Encephalopathy acute    Seizure  Seizure    MS (multiple sclerosis)  MS (multiple sclerosis)    Diabetes mellitus  Diabetes mellitus    Hypertension  Hypertension    Hypothyroidism  Hypothyroidism    Chronic kidney disease, unspecified CKD stage  Chronic kidney disease, unspecified CKD stage    Prophylactic measure  Prophylactic measure    Anemia  Anemia           Patient is a 80 year old  Female who presents with a chief complaint of ACUTE ENCEPHALOPATHY (09 Aug 2021 14:17)      INTERVAL HPI/OVERNIGHT EVENTS: no overnight events  Patient confused, speech nonsensical   Seen by Palliative to discuss GOC-plan for comfort/home hospice at discharge. No further testing at this time.   Plan for discharge home with caregiver/home hospice 8/11     REVIEW OF SYSTEMS:  unable to obtain due to AMS      Vital Signs Last 24 Hrs  T(C): 36.5 (10 Aug 2021 13:10), Max: 36.6 (09 Aug 2021 20:02)  T(F): 97.7 (10 Aug 2021 13:10), Max: 97.9 (09 Aug 2021 20:02)  HR: 116 (10 Aug 2021 13:10) (85 - 116)  BP: 114/89 (10 Aug 2021 13:10) (114/89 - 199/63)  RR: 18 (10 Aug 2021 13:10) (18 - 19)  SpO2: 98% (10 Aug 2021 13:10) (93% - 98%)    PHYSICAL EXAM:  GENERAL: NAD  HEAD: Normocephalic  NERVOUS SYSTEM:  Alert  CHEST/LUNG: Clear to percussion bilaterally; No rales, rhonchi, wheezing, or rubs  HEART: Regular rate and rhythm; No murmurs, rubs, or gallops  ABDOMEN: Soft, Nontender, Nondistended; Bowel sounds present  EXTREMITIES:  2+ Peripheral Pulses, No clubbing, cyanosis, or edema  SKIN: No rashes or lesions    Consultant(s) Notes Reviewed:  [x ] YES  [ ] NO  Care Discussed with Consultants/Other Providers [ x] YES  [ ] NO    LABS:                                             8.1    6.78  )-----------( 276      ( 10 Aug 2021 11:42 )             25.7   08-10    143  |  109<H>  |  15  ----------------------------<  133<H>  4.2   |  27  |  1.05    Ca    8.9      10 Aug 2021 11:48  Phos  3.0     08-09  Mg     2.2     08-09      MEDICATIONS  (STANDING):  aspirin  chewable 81 milliGRAM(s) Oral daily  atorvastatin 40 milliGRAM(s) Oral at bedtime  citalopram 10 milliGRAM(s) Oral daily  dextrose 5% + sodium chloride 0.9%. 1000 milliLiter(s) (50 mL/Hr) IV Continuous <Continuous>  dextrose 5% + sodium chloride 0.9%. 1000 milliLiter(s) (50 mL/Hr) IV Continuous <Continuous>  enoxaparin Injectable 40 milliGRAM(s) SubCutaneous daily  folic acid 1 milliGRAM(s) Oral daily  insulin lispro (ADMELOG) corrective regimen sliding scale   SubCutaneous every 6 hours  levothyroxine 25 MICROGram(s) Oral daily  mirtazapine 15 milliGRAM(s) Oral at bedtime  risperiDONE   Tablet 0.5 milliGRAM(s) Oral two times a day    MEDICATIONS  (PRN):  none        HEALTH ISSUES - PROBLEM Dx:  Encephalopathy acute  Encephalopathy acute    Seizure  Seizure    MS (multiple sclerosis)  MS (multiple sclerosis)    Diabetes mellitus  Diabetes mellitus    Hypertension  Hypertension    Hypothyroidism  Hypothyroidism    Chronic kidney disease, unspecified CKD stage  Chronic kidney disease, unspecified CKD stage    Prophylactic measure  Prophylactic measure    Anemia  Anemia

## 2021-08-10 NOTE — CONSULT NOTE ADULT - SUBJECTIVE AND OBJECTIVE BOX
Patient is a 80y old  Female who presents with a chief complaint of ACUTE ENCEPHALOPATHY (08 Aug 2021 16:33)      HPI:  History and details not known as yet.  Attempted to call home.  Available medical records reveiwed : "81 y/o female from home, bedbound, lives with HCP Danny ([phone]) who takes care of patient, has past medical hx of severe dementia, MS, HLD, hypertension, DM, hypothyroidism, stroke with left sided residual weakness?, CKD, chronic [place], was BIBEMS as patient was having shacking movements at home, making "funny faces" and not being herself. As per Ms Danny, patient was not complaining of any symptoms, no fevers, no sick contacts.     In ED patient was code Stroke and had a seizure episode s/p ativan x1  CTH and CTA head and neck showed stable chronic infarct left cerebellum and microvascular ischemic changes. Focal narrowing at origin of left vertebral artery.Narrowing supraclinoid internal carotid arteries        PAST MEDICAL & SURGICAL HISTORY:  Hypothyroidism  Hypertension  Diabetes mellitus  CVA (cerebral vascular accident)  MS (multiple sclerosis)  Hyperlipidemia  Chronic kidney disease, unspecified CKD stage  Dementia  No significant past surgical history      FAMILY HISTORY:  No pertinent family history in first degree relatives          Social Hx:  Nonsmoker, no drug or alcohol use    MEDICATIONS  (STANDING):  aspirin  chewable 81 milliGRAM(s) Oral daily  atorvastatin 40 milliGRAM(s) Oral at bedtime  citalopram 10 milliGRAM(s) Oral daily  dextrose 5% + sodium chloride 0.9%. 1000 milliLiter(s) (50 mL/Hr) IV Continuous <Continuous>  enoxaparin Injectable 40 milliGRAM(s) SubCutaneous daily  folic acid 1 milliGRAM(s) Oral daily  insulin lispro (ADMELOG) corrective regimen sliding scale   SubCutaneous every 6 hours  levothyroxine 25 MICROGram(s) Oral daily  mirtazapine 15 milliGRAM(s) Oral at bedtime  risperiDONE   Tablet 0.5 milliGRAM(s) Oral two times a day       Allergies    levofloxacin (Unknown)    Intolerances        ROS: Pertinent positives in HPI, all other ROS were reviewed and are negative.      Vital Signs Last 24 Hrs  T(C): 35.4 (08 Aug 2021 20:33), Max: 36.3 (08 Aug 2021 04:40)  T(F): 95.8 (08 Aug 2021 20:33), Max: 97.3 (08 Aug 2021 04:40)  HR: 76 (08 Aug 2021 20:33) (53 - 76)  BP: 155/70 (08 Aug 2021 20:33) (111/52 - 155/70)  BP(mean): --  RR: 18 (08 Aug 2021 20:33) (18 - 20)  SpO2: 99% (08 Aug 2021 20:33) (99% - 100%)        Constitutional: awake and alert.  HEENT: PERRLA, EOMI,   Neck: Supple.  Respiratory: Breath sounds are clear bilaterally  Cardiovascular: S1 and S2, regular  rhythm  Gastrointestinal: soft, nontender  Extremities:  no edema  Vascular: Caritid Bruit - no  Musculoskeletal: no joint swelling/tenderness, no abnormal movements  Skin: No rashes    Neurological exam:  HF:Mute awake, follows a few commands;    CN: EUSEBIA, EOMI, VFF, facial sensation normal, no NLFD, tongue midline, Palate moves equally, SCM equal bilaterally  Motor: Spasticity 2-3/5 in UE  & 1-2/5 LE    Sens: Intact to  PP/    Reflexes: Symmetric and normal . BJ 2+, BR 2+, KJ 2+, AJ 2+, Babinski bilateral  Coord:  No FNFA,    Gait/Balance: Cannot turn sit    NIHSS: 23  MRS 5    1A: Level of consciousness       0= Alert; keenly responsive  1B: Ask month and age       +2= 0 questions right  1C: "Blink eyes" and "Squeeze Hands"       0= Performs both       +1= Performs 1 task  2: Horizontal EOMs       0= Normal  3: Visual fields       0= No visual loss  4: Facial palsy (use grimace if obtunded)       +3= B/l complete paralysis (upper/lower face)  5A: Left arm motor drift (count out loud and use fingers to show count)       +2= Drift, hits bed  5B: Right arm motor drift       +1= Drift but doesn't hit bed  6A: Left leg motor drift       +3= No effort against gravity  6B: Right leg motor drift       +3= No effort against gravity  7: Limb ataxia (FNF/heel-shin)           0= Does not understand       0= Paralyzed  8: Sensation          +2= No response and quadriplegic  9: Language/aphasia- describe the scene (on duncan); name the items; read the sentences (on duncan)       +3= Mute/global aphasia: no usable speech/auditory comprehension  10: Dysarthria- read the words       +2= Mute/anarthric  11: Extinction/inattention       +1= visual/tactile/auditory/spatial/personal inattention         Labs:                        7.6    6.61  )-----------( 250      ( 08 Aug 2021 06:58 )             24.2     08-08    140  |  108  |  30<H>  ----------------------------<  84  5.0   |  28  |  0.94    Ca    9.0      08 Aug 2021 06:58  Phos  3.5     08-08  Mg     2.2     08-08    TPro  6.2  /  Alb  2.6<L>  /  TBili  0.2  /  DBili  x   /  AST  17  /  ALT  29  /  AlkPhos  109  08-07      08-08 Chol 183 LDL -- HDL 57 Trig 63  PT/INR - ( 07 Aug 2021 14:30 )   PT: 11.6 sec;   INR: 0.97 ratio         PTT - ( 07 Aug 2021 14:30 )  PTT:35.4 sec    Radiology report:  - CT head:  < from: CT Angio Neck w/ IV Cont (08.07.21 @ 13:47) >  EXAM:  CT ANGIO NECK (W)AW IC                          EXAM:  CT PERFUSION W MAPS IC                          EXAM:  CT ANGIO BRAIN (W)AW IC                            PROCEDURE DATE:  08/07/2021          INTERPRETATION:  CLINICAL STATEMENT: CVA    TECHNIQUE: CTA brain and neck. CT perfusion: After the administration of 50  cc of Omnipaque 300 serial thin sections were obtained through the brain the  purposes of evaluating CT perfusion. Raw data was sent to the ischemia rapid  view software forpostprocessing. 90 cc Omnipaque 350 Intravenous contrast  was administered for the CTA compartment. 2-D MIP and 3-D volume rendering  images.    COMPARISON: None.    FINDINGS:  CTA of the neck:  Calcified atherosclerotic plaques noted in the aortic arch and carotid bulb    The common carotid and internal carotid arteries are patent.    The extracranial vertebral arteries are patent. Focal narrowing at origin of left vertebral artery    Degenerative changes noted    CTA Head:  Calcified atherosclerotic plaques cavernous and supraclinoid internal carotid arteries with narrowing supraclinoid internal carotid arteries    The proximal anterior, middle and posterior cerebral arteries are patent.    The intracranial vertebral and basilar arteries are patent.    There is no intracranial aneurysm.      CT perfusion:  No core infarct or evidence of delayed mean transit time is identified.    CBF<30% volume: 0 ml  Tmax>6.0 s volume: 0 ml  Mismatch volume: 0 ml  Mismatch ratio: none    IMPRESSION:  Focal narrowing at origin of left vertebral artery.    Narrowing supraclinoid internal carotid arteries    Unremarkable CT perfusion    --- End of Report ---    < end of copied text >  
  [facility] Ger[facility] and Palliative Consult Service:  Dr. Nelly Negrete: cell ([phone])  Dr. Annette Marc: cell ([phone])   Mercy Briseno NP: cell ([phone])  Paola Acevedo NP: cell ([phone])   Clifford Gee LSW: cell ([phone])     HPI:  79 y/o female from home, bedbound, lives with [facility] Danny ([phone]) who takes care of patient, has past medical hx of severe dementia, MS, HLD, hypertension, DM, hypothyroidism, stroke with left sided residual weakness?, CKD, chronic [place], was BIBEMS as patient was having shacking movements at home, making "funny faces" and not being herself. As per Ms Danny, patient was not complaining of any symptoms, no fevers, no sick contacts.     Interval hx:  Pt Aox1.  In ED patient had a seizure episode s/p ativan x1  CTH and CTA head and neck showed stable chronic infarct left cerebellum and microvascular ischemic changes. Focal narrowing at origin of left vertebral artery.Narrowing supraclinoid internal carotid arteries      PAST MEDICAL & SURGICAL HISTORY:  Hypothyroidism    Hypertension    Diabetes mellitus    CVA (cerebral vascular accident)    MS (multiple sclerosis)    Hyperlipidemia    Chronic kidney disease, unspecified CKD stage    Dementia    No significant past surgical history        SOCIAL HISTORY:    Admitted from:  home with family friend Danny Willams     Restoration:    Hinduism                                Preferred Language: English     Danny Willams (HCP) Phone#  [phone]  Radha Olga  (neighbor)  Phone# [phone]    FAMILY HISTORY:  No pertinent family history in first degree relatives     unable to obtain from patient due to poor mentation  Baseline ADLs (prior to admission): dependent    Allergies    levofloxacin (Unknown)    Intolerances      Present Symptoms:   Unable to obtain due to poor mentation    MEDICATIONS  (STANDING):  aspirin  chewable 81 milliGRAM(s) Oral daily  atorvastatin 40 milliGRAM(s) Oral at bedtime  citalopram 10 milliGRAM(s) Oral daily  dextrose 5% + sodium chloride 0.9%. 1000 milliLiter(s) (50 mL/Hr) IV Continuous <Continuous>  dextrose 5% + sodium chloride 0.9%. 1000 milliLiter(s) (50 mL/Hr) IV Continuous <Continuous>  enoxaparin Injectable 40 milliGRAM(s) SubCutaneous daily  folic acid 1 milliGRAM(s) Oral daily  insulin lispro (ADMELOG) corrective regimen sliding scale   SubCutaneous every 6 hours  levothyroxine 25 MICROGram(s) Oral daily  mirtazapine 15 milliGRAM(s) Oral at bedtime  risperiDONE   Tablet 0.5 milliGRAM(s) Oral two times a day    MEDICATIONS  (PRN):      PHYSICAL EXAM:  Vital Signs Last 24 Hrs  T(C): 36.6 (10 Aug 2021 04:42), Max: 36.6 (09 Aug 2021 20:02)  T(F): 97.9 (10 Aug 2021 04:42), Max: 97.9 (09 Aug 2021 20:02)  HR: 100 (10 Aug 2021 09:38) (85 - 101)  BP: 144/85 (10 Aug 2021 09:38) (144/85 - 199/63)  BP(mean): --  RR: 19 (10 Aug 2021 04:42) (18 - 19)  SpO2: 93% (10 Aug 2021 09:38) (93% - 98%)    General: Frail elderly woman, AOx1, confused.  NAD    Palliative Performance Scale/Karnofsky Score: 40  ECOG Performance: n/a    HEENT: temporal wasting, most mucous membrane  Lungs: unlabored on RA  CV: RRR, S1S2  GI: soft non distended + BS  : incontinent    Musculoskeletal: bedbound, b/l footdrop, no edema  Skin:  poor skin turgor, stage I pressure wounds to coccyx and b/l heels  Neuro: able to follow simple commands  Oral intake ability: poor po intake    LABS:                        8.1    6.78  )-----------( 276      ( 10 Aug 2021 11:42 )             25.7     08-10    143  |  109<H>  |  15  ----------------------------<  133<H>  4.2   |  27  |  1.05    Ca    8.9      10 Aug 2021 11:48  Phos  3.0     08-09  Mg     2.2     08-09      < from: CT Angio Head w/ IV Cont (08.07.21 @ 13:47) >    EXAM:  CT ANGIO NECK (W)AW IC                          EXAM:  CT PERFUSION W MAPS IC                          EXAM:  CT ANGIO BRAIN (W)AW IC                            PROCEDURE DATE:  08/07/2021          INTERPRETATION:  CLINICAL STATEMENT: CVA    TECHNIQUE: CTA brain and neck. CT perfusion: After the administration of 50  cc of Omnipaque 300 serial thin sections were obtained through the brain the  purposes of evaluating CT perfusion. Raw data was sent to the ischemia rapid  view software forpostprocessing. 90 cc Omnipaque 350 Intravenous contrast  was administered for the CTA compartment. 2-D MIP and 3-D volume rendering  images.    COMPARISON: None.    FINDINGS:  CTA of the neck:  Calcified atherosclerotic plaques noted in the aortic arch and carotid bulb    The common carotid and internal carotid arteries are patent.    The extracranial vertebral arteries are patent. Focal narrowing at origin of left vertebral artery    Degenerative changes noted    CTA Head:  Calcified atherosclerotic plaques cavernous and supraclinoid internal carotid arteries with narrowing supraclinoid internal carotid arteries    The proximal anterior, middle and posterior cerebral arteries are patent.    The intracranial vertebral and basilar arteries are patent.    There is no intracranial aneurysm.      CT perfusion:  No core infarct or evidence of delayed mean transit time is identified.    CBF<30% volume: 0 ml  Tmax>6.0 s volume: 0 ml  Mismatch volume: 0 ml  Mismatch ratio: none    IMPRESSION:  Focal narrowing at origin of left vertebral artery.    Narrowing supraclinoid internal carotid arteries    Unremarkable CT perfusion    < end of copied text >      RADIOLOGY & ADDITIONAL STUDIES: Reviewed  ADVANCED DIRECTIVES:   DNR/DNI/no feeding tube/DNH    
none

## 2021-08-10 NOTE — PROGRESS NOTE ADULT - PROBLEM SELECTOR PLAN 1
- Patient admitted for acute encephalopathy   - Stroke vs Infectious vs seizures vs worsening dementia  - CXR unremarkable  - CTH and CTA head and neck showed stable chronic infarct left cerebellum and microvascular ischemic changes. Focal narrowing at origin of left vertebral artery. Narrowing supraclinoid internal carotid arteries   -SLP--> recommend pureed with thickened liquids  - Neuro checks q4 hours, Monitor for mental status changes   - Fall precautions/Aspiration precautions/Seizure precautions  - cont Aspirin and Statins, holding Plavix until neuro recommendations   - EEG to r/o seizure-unremarkable   -MR/MRA brain+ head/neck-cancelled-  - Neurology Dr Davis Consulted  - Palliative consulted, plan for home hospice at discharge. Comfort measures only - Patient admitted for acute encephalopathy   - Stroke vs Infectious vs seizures vs worsening dementia  - CXR unremarkable  - CTH and CTA head and neck showed stable chronic infarct left cerebellum and microvascular ischemic changes. Focal narrowing at origin of left vertebral artery. Narrowing supraclinoid internal carotid arteries   -SLP--> recommend pureed with thickened liquids  - Neuro checks q4 hours, Monitor for mental status changes   - Fall precautions/Aspiration precautions/Seizure precautions  - cont Aspirin and Statins, holding Plavix until neuro recommendations   - EEG to r/o seizure-unremarkable   -MR/MRA brain+ head/neck, vascular-cancelled-not consistent with GO  - Neurology Dr Davis Consulted  - Palliative consulted, plan for home hospice at discharge. Comfort measures only

## 2021-08-10 NOTE — GOALS OF CARE CONVERSATION - ADVANCED CARE PLANNING - CONVERSATION DETAILS
Palliative care team spoke with pt's HCP Paulo and her neighbor Radha Espinoza (to assist) to discuss pt's current condition and goals of care. Pt's HCP acknowledged pt's poor prognosis and recent decline. Palliative team provided education around the risks and benefits of hospice and the settings where hospice services can be offered. Pt's HCP stated that she would like the pt to go home with hospice, as to respect pt's wishes. Pt's HCP agreed to reach out as needed.    Kingston Flynn  [phone]

## 2021-08-10 NOTE — PROGRESS NOTE ADULT - PROBLEM SELECTOR PLAN 3
Bed bound at baseline, has caregiver.     -cont home lexapro  -cont home remeron   -cont home risperdal  -plan home hospice

## 2021-08-10 NOTE — PHYSICAL THERAPY INITIAL EVALUATION ADULT - LEVEL OF INDEPENDENCE: SIT/SUPINE, REHAB EVAL
PATIENT RECEIVED CALL FROM OFFICE WITH NO NAME.  PATIENT REQUEST REVIEW OF LAB RESULTS.  PATIENT ALSO HAS A TASTE OF METAL IN HIS MOUTH. WHEN HE EATS HE DOES NOT TASTE IT. PATIENT REPORTS METAL TASTE IS IN FRONT OF HIS MOUTH AND HAS GOTTEN WORSE.  PLEASE ADVISE    PATIENT CALL BACK # [phone]   unable to perform

## 2021-08-10 NOTE — PHYSICAL THERAPY INITIAL EVALUATION ADULT - SIT-TO-STAND BALANCE
N/A No Repair - Repaired With Adjacent Surgical Defect Text (Leave Blank If You Do Not Want): After the excision the defect was repaired concurrently with another surgical defect which was in close approximation.

## 2021-08-10 NOTE — CONSULT NOTE ADULT - PROBLEM SELECTOR RECOMMENDATION 3
2/2 MS and advanced dementia.  Pt has been bedbound, dependent for over 5 years.  High risk for further skin failure.    Skin care per protocol  Frequent positioning    Comfort only    Pt eval noted

## 2021-08-10 NOTE — PROGRESS NOTE ADULT - ASSESSMENT
81 y/o female from home, bedbound, lives with HCP Danny ([phone]) who takes care of patient, has past medical hx of severe dementia, MS, HLD, hypertension, DM, hypothyroidism, stroke with left sided residual weakness?, CKD, chronic Macedo, was BIBEMS as patient was having shacking movements at home, making "funny faces" and not being herself. Patient admitted for Acute encephalopathy and possible seizure. Neurology evaluated. EEG without seizure activity. Palliative consulted, plan for comfort/home hospice at discharge. SW aware of disposition.
     Problem: Encephalopathy acute.  Plan: - Patient admitted for acute encephalopathy   - Stoke vs Infectious vs seizures vs worsening dementia  - CXR unremarkable, CTH and CTA head and neck showed stable chronic infarct left cerebellum and microvascular ischemic changes. Focal narrowing at origin of left vertebral artery. Narrowing supraclinoid internal carotid arteries   - NPO, IVF for now, speech/ swallow in am  - Neuro checks q4 hours, Monitor for mental status changes   - Fall precautions/Aspiration precautions/Seizure precautions  - Aspirin and Statins, hold Plavix until neuro recommendations   - F/u UA, Ucx to r/o infection, start Abx if febrile or any findings on labs   - Consider EEG to r/o seizure  - Neurology Dr Davis Consulted  - Palliative consult, Vascular surgery consult in the am.       Problem/Plan - 2:  ·  Problem: Seizure.  Plan: - Patient with new onset of seizures, post ictal period unknown   - On exam unable to assess for neuro deficits as patient does not follow commands and is contracted  - Differentials Stroke/TIA vs Hypoglycemia   - Focal (impaired or retained awareness) vs Generalized  - EKG showed NSR, no acute changes   - Consider EEG to r/o seizure  - Warming blanket for hypothermia  - Seizure/aspiration/fall precautions  - Neuro Dr Davis consulted.      Problem/Plan - 3:  ·  Problem: Anemia.  Plan: - On admission, patient with hg 11, no signs of active bleeding   - As per HCP, no dark black tarry stool, no hx NSAIDs abuse, no episodes of BRBPR (low concern of GI bleed), no hematemesis    - No Hypotension or any signs of hemorrhagic shock   - Hemodynamically stable, IV access   - Type and Screen, Transfuse if Hg <7  - F/u iron panel   - FOBT negative   - Monitor CBC daily.      Problem/Plan - 4:  ·  Problem: MS (multiple sclerosis).  Plan: - Patient has hx of MS, no acute intervention  - C/w home meds.      Problem/Plan - 5:  ·  Problem: Diabetes mellitus.  Plan: - Patient takes not on any meds at home  - Started on HSS  - FS q 6 hrs while NPO   - When feasible to start diet (DASH diet with consistent carb), do fingerstick before meals and at bedtime  - Target -180  - F/u A1c.      Problem/Plan - 6:  Problem: Hypertension. Plan: - Patient has history of Hypertension not on any meds at home   - Monitor BP and start meds as needed.     Problem/Plan - 7:  ·  Problem: Hypothyroidism.  Plan: - Patient with hx of hypothyroidism on Synthroid at home   - C/w home meds  - F/u TSH and adjust medications if needed.      Problem/Plan - 8:  ·  Problem: Chronic kidney disease, unspecified CKD stage.  Plan: - Patient has hx of CKD unknown baseline  - Has chronic Macedo, changed in ED.      Problem/Plan - 9:  ·  Problem: Prophylactic measure.    PPI for GI prophylaxis  Lovenox for DVT PPX.     D/w NP.  Virginia Shultz MD
79 y/o female from home, bedbound, lives with HCP Danny ([phone]) who takes care of patient, has past medical hx of severe dementia, MS, HLD, hypertension, DM, hypothyroidism, stroke with left sided residual weakness?, CKD, chronic Macedo, was BIBEMS as patient was having shacking movements at home, making "funny faces" and not being herself. Patient admitted for Acute encephalopathy and possible seizure. Neurology following, plan for MR/MRA head, EEG, PT, speech eval.

## 2021-08-10 NOTE — ADVANCED PRACTICE NURSE CONSULT - ASSESSMENT
This is a 80yr old female patient admitted for Encephalopathy, presenting with the following:  -There is evidence of healed wounds to the Bilateral Gluteus  -There is a Stage 1 Pressure Injury to the Coccyx and Bilateral Heels, as evident by non-blanchable erythema

## 2021-08-10 NOTE — PROGRESS NOTE ADULT - PROBLEM SELECTOR PLAN 10
IMPROVE VTE Individual Risk Assessment  RISK                                                                Points  [  ] Previous VTE                                                  3  [  ] Thrombophilia                                               2  [  ] Lower limb paralysis                                      2        (unable to hold up >15 seconds)    [  ] Current Cancer                                              2         (within 6 months)  [  ] Immobilization > 24 hrs                                1  [  ] ICU/CCU stay > 24 hours                              1  [  ] Age > 60                                                      1  IMPROVE VTE Score ____2_____    PPI for GI prophylaxis  Lovenox for DVT PPX
IMPROVE VTE Individual Risk Assessment  RISK                                                                Points  [  ] Previous VTE                                                  3  [  ] Thrombophilia                                               2  [  ] Lower limb paralysis                                      2        (unable to hold up >15 seconds)    [  ] Current Cancer                                              2         (within 6 months)  [  ] Immobilization > 24 hrs                                1  [  ] ICU/CCU stay > 24 hours                              1  [  ] Age > 60                                                      1  IMPROVE VTE Score ____2_____    PPI for GI prophylaxis  Lovenox for DVT PPX

## 2021-08-10 NOTE — PROGRESS NOTE ADULT - PROBLEM SELECTOR PLAN 4
- On admission, patient with hg 11, no signs of active bleeding   - As per HCP, no dark black tarry stool, no hx NSAIDs abuse, no episodes of BRBPR (low concern of GI bleed), no hematemesis    - mild iron deficiency   - FOBT negative

## 2021-08-10 NOTE — PROGRESS NOTE ADULT - ATTENDING COMMENTS
nothing
Patient seen and examined. Patient's history, vitals, labs, imaging studies reviewed. Discussed with NP, agree with note with edits. Discussed with palliative care NP as well, plan is for home hospice.  Virginia Shultz MD
Patient seen and examined. Patient's history, vitals, labs, imaging studies reviewed. Discussed with NP, agree with note with edits.  Virginia Shultz MD

## 2021-08-10 NOTE — PROGRESS NOTE ADULT - PROBLEM SELECTOR PLAN 2
- Patient with new onset of seizures, post ictal period unknown, s/p Ativan x1 in ED    - Differentials Stroke/TIA vs Hypoglycemia   - Focal (impaired or retained awareness) vs Generalized  - EKG showed NSR, no acute changes   - Neuro consulted-Dr. Davis  - EEG without epileptiform or seizure abnormality  - canceled MRI/MR brain/head +neck-not consistent with GOC

## 2021-08-11 ENCOUNTER — TRANSCRIPTION ENCOUNTER (OUTPATIENT)
Age: 81
End: 2021-08-11

## 2021-08-11 VITALS
OXYGEN SATURATION: 97 % | TEMPERATURE: 98 F | DIASTOLIC BLOOD PRESSURE: 63 MMHG | SYSTOLIC BLOOD PRESSURE: 166 MMHG | HEART RATE: 85 BPM | RESPIRATION RATE: 17 BRPM

## 2021-08-11 LAB
GLUCOSE BLDC GLUCOMTR-MCNC: 116 MG/DL — HIGH (ref 70–99)
GLUCOSE BLDC GLUCOMTR-MCNC: 92 MG/DL — SIGNIFICANT CHANGE UP (ref 70–99)
MRSA PCR RESULT.: SIGNIFICANT CHANGE UP
S AUREUS DNA NOSE QL NAA+PROBE: SIGNIFICANT CHANGE UP

## 2021-08-11 PROCEDURE — 71045 X-RAY EXAM CHEST 1 VIEW: CPT

## 2021-08-11 PROCEDURE — 85027 COMPLETE CBC AUTOMATED: CPT

## 2021-08-11 PROCEDURE — 80053 COMPREHEN METABOLIC PANEL: CPT

## 2021-08-11 PROCEDURE — 85730 THROMBOPLASTIN TIME PARTIAL: CPT

## 2021-08-11 PROCEDURE — 84100 ASSAY OF PHOSPHORUS: CPT

## 2021-08-11 PROCEDURE — 82272 OCCULT BLD FECES 1-3 TESTS: CPT

## 2021-08-11 PROCEDURE — 70498 CT ANGIOGRAPHY NECK: CPT | Mod: MA

## 2021-08-11 PROCEDURE — 84443 ASSAY THYROID STIM HORMONE: CPT

## 2021-08-11 PROCEDURE — 86769 SARS-COV-2 COVID-19 ANTIBODY: CPT

## 2021-08-11 PROCEDURE — 83735 ASSAY OF MAGNESIUM: CPT

## 2021-08-11 PROCEDURE — 86901 BLOOD TYPING SEROLOGIC RH(D): CPT

## 2021-08-11 PROCEDURE — 83550 IRON BINDING TEST: CPT

## 2021-08-11 PROCEDURE — 86900 BLOOD TYPING SEROLOGIC ABO: CPT

## 2021-08-11 PROCEDURE — 95819 EEG AWAKE AND ASLEEP: CPT

## 2021-08-11 PROCEDURE — 84484 ASSAY OF TROPONIN QUANT: CPT

## 2021-08-11 PROCEDURE — 84466 ASSAY OF TRANSFERRIN: CPT

## 2021-08-11 PROCEDURE — 93005 ELECTROCARDIOGRAM TRACING: CPT

## 2021-08-11 PROCEDURE — 83036 HEMOGLOBIN GLYCOSYLATED A1C: CPT

## 2021-08-11 PROCEDURE — 87641 MR-STAPH DNA AMP PROBE: CPT

## 2021-08-11 PROCEDURE — 0042T: CPT

## 2021-08-11 PROCEDURE — 70450 CT HEAD/BRAIN W/O DYE: CPT | Mod: MA

## 2021-08-11 PROCEDURE — 81001 URINALYSIS AUTO W/SCOPE: CPT

## 2021-08-11 PROCEDURE — 85025 COMPLETE CBC W/AUTO DIFF WBC: CPT

## 2021-08-11 PROCEDURE — 87086 URINE CULTURE/COLONY COUNT: CPT

## 2021-08-11 PROCEDURE — 86850 RBC ANTIBODY SCREEN: CPT

## 2021-08-11 PROCEDURE — 80061 LIPID PANEL: CPT

## 2021-08-11 PROCEDURE — 87635 SARS-COV-2 COVID-19 AMP PRB: CPT

## 2021-08-11 PROCEDURE — 83540 ASSAY OF IRON: CPT

## 2021-08-11 PROCEDURE — 82728 ASSAY OF FERRITIN: CPT

## 2021-08-11 PROCEDURE — 70496 CT ANGIOGRAPHY HEAD: CPT | Mod: MA

## 2021-08-11 PROCEDURE — 92610 EVALUATE SWALLOWING FUNCTION: CPT

## 2021-08-11 PROCEDURE — 80048 BASIC METABOLIC PNL TOTAL CA: CPT

## 2021-08-11 PROCEDURE — 95957 EEG DIGITAL ANALYSIS: CPT

## 2021-08-11 PROCEDURE — 82962 GLUCOSE BLOOD TEST: CPT

## 2021-08-11 PROCEDURE — 36415 COLL VENOUS BLD VENIPUNCTURE: CPT

## 2021-08-11 PROCEDURE — 99291 CRITICAL CARE FIRST HOUR: CPT | Mod: 25

## 2021-08-11 PROCEDURE — 87640 STAPH A DNA AMP PROBE: CPT

## 2021-08-11 PROCEDURE — 85610 PROTHROMBIN TIME: CPT

## 2021-08-11 RX ORDER — FOLIC ACID 0.8 MG
1 TABLET ORAL
Qty: 0 | Refills: 0 | DISCHARGE

## 2021-08-11 RX ORDER — FOLIC ACID 0.8 MG
1 TABLET ORAL
Qty: 30 | Refills: 0
Start: 2021-08-11 | End: 2021-09-09

## 2021-08-11 RX ORDER — INSULIN LISPRO 100/ML
VIAL (ML) SUBCUTANEOUS
Refills: 0 | Status: DISCONTINUED | OUTPATIENT
Start: 2021-08-11 | End: 2021-08-11

## 2021-08-11 RX ORDER — METOPROLOL TARTRATE 50 MG
0.5 TABLET ORAL
Qty: 15 | Refills: 0
Start: 2021-08-11 | End: 2021-09-09

## 2021-08-11 RX ADMIN — Medication 81 MILLIGRAM(S): at 11:22

## 2021-08-11 RX ADMIN — Medication 1 MILLIGRAM(S): at 11:21

## 2021-08-11 RX ADMIN — ENOXAPARIN SODIUM 40 MILLIGRAM(S): 100 INJECTION SUBCUTANEOUS at 11:22

## 2021-08-11 RX ADMIN — Medication 12.5 MILLIGRAM(S): at 05:24

## 2021-08-11 RX ADMIN — Medication 25 MICROGRAM(S): at 05:24

## 2021-08-11 RX ADMIN — RISPERIDONE 0.5 MILLIGRAM(S): 4 TABLET ORAL at 05:24

## 2021-08-11 RX ADMIN — CITALOPRAM 10 MILLIGRAM(S): 10 TABLET, FILM COATED ORAL at 11:22

## 2021-08-11 NOTE — DISCHARGE NOTE NURSING/CASE MANAGEMENT/SOCIAL WORK - PATIENT PORTAL LINK FT
You can access the FollowMyHealth Patient Portal offered by [facility] by registering at the following website: [email or web address]. By joining [employer]’s FollowMyHealth portal, you will also be able to view your health information using other applications (apps) compatible with our system.

## 2021-10-12 NOTE — PROGRESS NOTE ADULT - PROBLEM SELECTOR PROBLEM 9
Late entry:  Around 1515, May started to get more uncomfortable and was not coping well un-mediated.  Deep recurrent variable decels noted with contractions, with nadirs in the 50s.  SVE 7cm.  Dr. Humphries called to come evaluate decels and for possible quick transition phase.  Dr. Humphries at the bedside at 1530 and was at the bedside with writer and Masha RN through delivery.  IV fentanyl given per patient request, TXA administered.  Amnioinfusion restarted with another bolus at 1540.  Patient reposition to hands and knees and variable decels resolved around 1545. Patient found to be complete at 1603, started pushing.  FHT down to the 60s with  and patient moved side-to-side.  Delivery team called to attend birth because of decels and proximity to IV fentanyl administration.  Delivery of vigorous girl at 1613.  Apgars 8,9.  See delivery summary for further details.   CVA (cerebral vascular accident)

## 2021-10-22 NOTE — PROGRESS NOTE ADULT - PROBLEM SELECTOR PLAN 6
Positive reinforcement Holding home losartan 50 mg, restart if BP continues to trend high Holding home losartan 50 mg as patient is unable to swallow  Started Labetalol pushes prn

## 2021-12-03 NOTE — ED PROVIDER NOTE - TOBACCO USE
Pt discharged to home. Pt verbalized understanding of discharge and follow up instructions. Pt ambulated out  of ED with a steady gait.    Unknown if ever smoked

## 2022-05-11 NOTE — ED PROVIDER NOTE - NEUROLOGICAL, MLM
well developed, well nourished , in no acute distress , ambulating without difficulty , normal communication ability awake, blinking eyes, no other spontaneous movement

## 2022-06-13 ENCOUNTER — EMERGENCY (EMERGENCY)
Facility: HOSPITAL | Age: 82
LOS: 1 days | Discharge: HOME | End: 2022-06-13
Attending: EMERGENCY MEDICINE
Payer: MEDICARE

## 2022-06-13 VITALS
HEART RATE: 88 BPM | TEMPERATURE: 98 F | OXYGEN SATURATION: 97 % | RESPIRATION RATE: 17 BRPM | DIASTOLIC BLOOD PRESSURE: 55 MMHG | WEIGHT: 149.91 LBS | HEIGHT: 65 IN | SYSTOLIC BLOOD PRESSURE: 118 MMHG

## 2022-06-13 PROBLEM — F03.90 UNSPECIFIED DEMENTIA, UNSPECIFIED SEVERITY, WITHOUT BEHAVIORAL DISTURBANCE, PSYCHOTIC DISTURBANCE, MOOD DISTURBANCE, AND ANXIETY: Chronic | Status: ACTIVE | Noted: 2021-08-07

## 2022-06-13 PROBLEM — N18.9 CHRONIC KIDNEY DISEASE, UNSPECIFIED: Chronic | Status: ACTIVE | Noted: 2021-08-07

## 2022-06-13 PROBLEM — E78.5 HYPERLIPIDEMIA, UNSPECIFIED: Chronic | Status: ACTIVE | Noted: 2021-08-07

## 2022-06-13 LAB
ALBUMIN SERPL ELPH-MCNC: 2.4 G/DL — LOW (ref 3.5–5)
ALP SERPL-CCNC: 89 U/L — SIGNIFICANT CHANGE UP (ref 40–120)
ALT FLD-CCNC: 16 U/L DA — SIGNIFICANT CHANGE UP (ref 10–60)
ANION GAP SERPL CALC-SCNC: 5 MMOL/L — SIGNIFICANT CHANGE UP (ref 5–17)
AST SERPL-CCNC: 16 U/L — SIGNIFICANT CHANGE UP (ref 10–40)
BILIRUB SERPL-MCNC: 0.1 MG/DL — LOW (ref 0.2–1.2)
BUN SERPL-MCNC: 28 MG/DL — HIGH (ref 7–18)
CALCIUM SERPL-MCNC: 8.1 MG/DL — LOW (ref 8.4–10.5)
CHLORIDE SERPL-SCNC: 114 MMOL/L — HIGH (ref 96–108)
CO2 SERPL-SCNC: 24 MMOL/L — SIGNIFICANT CHANGE UP (ref 22–31)
CREAT SERPL-MCNC: 1.27 MG/DL — SIGNIFICANT CHANGE UP (ref 0.5–1.3)
EGFR: 42 ML/MIN/1.73M2 — LOW
GLUCOSE SERPL-MCNC: 106 MG/DL — HIGH (ref 70–99)
HCT VFR BLD CALC: 25.9 % — LOW (ref 34.5–45)
HGB BLD-MCNC: 8.1 G/DL — LOW (ref 11.5–15.5)
MCHC RBC-ENTMCNC: 26.6 PG — LOW (ref 27–34)
MCHC RBC-ENTMCNC: 31.3 GM/DL — LOW (ref 32–36)
MCV RBC AUTO: 85.2 FL — SIGNIFICANT CHANGE UP (ref 80–100)
NRBC # BLD: 0 /100 WBCS — SIGNIFICANT CHANGE UP (ref 0–0)
PLATELET # BLD AUTO: 342 K/UL — SIGNIFICANT CHANGE UP (ref 150–400)
POTASSIUM SERPL-MCNC: 4.2 MMOL/L — SIGNIFICANT CHANGE UP (ref 3.5–5.3)
POTASSIUM SERPL-SCNC: 4.2 MMOL/L — SIGNIFICANT CHANGE UP (ref 3.5–5.3)
PROT SERPL-MCNC: 5.9 G/DL — LOW (ref 6–8.3)
RBC # BLD: 3.04 M/UL — LOW (ref 3.8–5.2)
RBC # FLD: 15.8 % — HIGH (ref 10.3–14.5)
SODIUM SERPL-SCNC: 143 MMOL/L — SIGNIFICANT CHANGE UP (ref 135–145)
WBC # BLD: 8.02 K/UL — SIGNIFICANT CHANGE UP (ref 3.8–10.5)
WBC # FLD AUTO: 8.02 K/UL — SIGNIFICANT CHANGE UP (ref 3.8–10.5)

## 2022-06-13 PROCEDURE — 86850 RBC ANTIBODY SCREEN: CPT

## 2022-06-13 PROCEDURE — 36415 COLL VENOUS BLD VENIPUNCTURE: CPT

## 2022-06-13 PROCEDURE — 99284 EMERGENCY DEPT VISIT MOD MDM: CPT

## 2022-06-13 PROCEDURE — 80053 COMPREHEN METABOLIC PANEL: CPT

## 2022-06-13 PROCEDURE — 85027 COMPLETE CBC AUTOMATED: CPT

## 2022-06-13 PROCEDURE — 86901 BLOOD TYPING SEROLOGIC RH(D): CPT

## 2022-06-13 PROCEDURE — 86900 BLOOD TYPING SEROLOGIC ABO: CPT

## 2022-06-13 NOTE — ED ADULT NURSE NOTE - CAS EDN DISCHARGE INTERVENTIONS
12/9/19:  Shala called requesting a refill of warfarin 5 mg tablets.  She has one refill available that she will  tomorrow, but wants this on file for when she needs next refill.   RX sent.  Gwen Abel RN    
IV discontinued, cath removed intact

## 2022-06-13 NOTE — ED PROVIDER NOTE - PATIENT PORTAL LINK FT
You can access the FollowMyHealth Patient Portal offered by [facility] by registering at the following website: [email or web address]. By joining [employer]’s FollowMyHealth portal, you will also be able to view your health information using other applications (apps) compatible with our system.

## 2022-06-13 NOTE — ED PROVIDER NOTE - OBJECTIVE STATEMENT
80 yo F pmh of dementia presents with reported blood in diaper. Pt unable to provide to history due to dementia. No family at bedside.

## 2022-06-13 NOTE — ED PROVIDER NOTE - PROGRESS NOTE DETAILS
labs - Hgb at baseline  Called contact number in pt's chart without answer and without voicemail set up.  Pt stable for dc, but need to contact [facility] prior to arranging transport home KERRI Agrawal spoke with [facility] who states she is home and will accept pt. Ambulance ordered.

## 2022-06-14 VITALS
HEART RATE: 84 BPM | OXYGEN SATURATION: 97 % | TEMPERATURE: 98 F | RESPIRATION RATE: 18 BRPM | SYSTOLIC BLOOD PRESSURE: 123 MMHG | DIASTOLIC BLOOD PRESSURE: 65 MMHG

## 2022-08-08 ENCOUNTER — INPATIENT (INPATIENT)
Facility: HOSPITAL | Age: 82
LOS: 0 days | DRG: 951 | End: 2022-08-09
Attending: INTERNAL MEDICINE | Admitting: INTERNAL MEDICINE
Payer: MEDICARE

## 2022-08-08 VITALS
TEMPERATURE: 100 F | RESPIRATION RATE: 22 BRPM | HEART RATE: 113 BPM | SYSTOLIC BLOOD PRESSURE: 67 MMHG | DIASTOLIC BLOOD PRESSURE: 49 MMHG | OXYGEN SATURATION: 84 % | HEIGHT: 65 IN

## 2022-08-08 DIAGNOSIS — R06.03 ACUTE RESPIRATORY DISTRESS: ICD-10-CM

## 2022-08-08 LAB
ALBUMIN SERPL ELPH-MCNC: 1.4 G/DL — LOW (ref 3.5–5)
ALP SERPL-CCNC: 153 U/L — HIGH (ref 40–120)
ALT FLD-CCNC: 23 U/L DA — SIGNIFICANT CHANGE UP (ref 10–60)
ANION GAP SERPL CALC-SCNC: 17 MMOL/L — SIGNIFICANT CHANGE UP (ref 5–17)
APTT BLD: 28.4 SEC — SIGNIFICANT CHANGE UP (ref 27.5–35.5)
AST SERPL-CCNC: 64 U/L — HIGH (ref 10–40)
BILIRUB SERPL-MCNC: 0.2 MG/DL — SIGNIFICANT CHANGE UP (ref 0.2–1.2)
BUN SERPL-MCNC: 54 MG/DL — HIGH (ref 7–18)
CALCIUM SERPL-MCNC: 7.4 MG/DL — LOW (ref 8.4–10.5)
CHLORIDE SERPL-SCNC: 122 MMOL/L — HIGH (ref 96–108)
CO2 SERPL-SCNC: 9 MMOL/L — CRITICAL LOW (ref 22–31)
CREAT SERPL-MCNC: 2.23 MG/DL — HIGH (ref 0.5–1.3)
EGFR: 21 ML/MIN/1.73M2 — LOW
GLUCOSE SERPL-MCNC: 88 MG/DL — SIGNIFICANT CHANGE UP (ref 70–99)
HCT VFR BLD CALC: 27.6 % — LOW (ref 34.5–45)
HGB BLD-MCNC: 7.8 G/DL — LOW (ref 11.5–15.5)
INR BLD: 1.07 RATIO — SIGNIFICANT CHANGE UP (ref 0.88–1.16)
LACTATE SERPL-SCNC: 6.6 MMOL/L — CRITICAL HIGH (ref 0.7–2)
MCHC RBC-ENTMCNC: 25.8 PG — LOW (ref 27–34)
MCHC RBC-ENTMCNC: 28.3 GM/DL — LOW (ref 32–36)
MCV RBC AUTO: 91.4 FL — SIGNIFICANT CHANGE UP (ref 80–100)
PLATELET # BLD AUTO: 428 K/UL — HIGH (ref 150–400)
POTASSIUM SERPL-MCNC: 5.5 MMOL/L — HIGH (ref 3.5–5.3)
POTASSIUM SERPL-SCNC: 5.5 MMOL/L — HIGH (ref 3.5–5.3)
PROT SERPL-MCNC: 4.7 G/DL — LOW (ref 6–8.3)
PROTHROM AB SERPL-ACNC: 12.8 SEC — SIGNIFICANT CHANGE UP (ref 10.5–13.4)
RBC # BLD: 3.02 M/UL — LOW (ref 3.8–5.2)
RBC # FLD: 19.2 % — HIGH (ref 10.3–14.5)
SODIUM SERPL-SCNC: 148 MMOL/L — HIGH (ref 135–145)
TROPONIN I, HIGH SENSITIVITY RESULT: 8.7 NG/L — SIGNIFICANT CHANGE UP
WBC # BLD: 11.06 K/UL — HIGH (ref 3.8–10.5)
WBC # FLD AUTO: 11.06 K/UL — HIGH (ref 3.8–10.5)

## 2022-08-08 PROCEDURE — 71045 X-RAY EXAM CHEST 1 VIEW: CPT | Mod: 26

## 2022-08-08 PROCEDURE — 93010 ELECTROCARDIOGRAM REPORT: CPT

## 2022-08-08 PROCEDURE — 99285 EMERGENCY DEPT VISIT HI MDM: CPT | Mod: CS

## 2022-08-08 RX ORDER — CEFEPIME 1 G/1
1000 INJECTION, POWDER, FOR SOLUTION INTRAMUSCULAR; INTRAVENOUS ONCE
Refills: 0 | Status: COMPLETED | OUTPATIENT
Start: 2022-08-08 | End: 2022-08-08

## 2022-08-08 RX ORDER — PIPERACILLIN AND TAZOBACTAM 4; .5 G/20ML; G/20ML
3.38 INJECTION, POWDER, LYOPHILIZED, FOR SOLUTION INTRAVENOUS ONCE
Refills: 0 | Status: DISCONTINUED | OUTPATIENT
Start: 2022-08-08 | End: 2022-08-08

## 2022-08-08 RX ORDER — VANCOMYCIN HCL 1 G
1000 VIAL (EA) INTRAVENOUS ONCE
Refills: 0 | Status: DISCONTINUED | OUTPATIENT
Start: 2022-08-08 | End: 2022-08-08

## 2022-08-08 RX ORDER — VANCOMYCIN HCL 1 G
1000 VIAL (EA) INTRAVENOUS ONCE
Refills: 0 | Status: COMPLETED | OUTPATIENT
Start: 2022-08-08 | End: 2022-08-08

## 2022-08-08 RX ORDER — SODIUM CHLORIDE 9 MG/ML
1000 INJECTION INTRAMUSCULAR; INTRAVENOUS; SUBCUTANEOUS ONCE
Refills: 0 | Status: COMPLETED | OUTPATIENT
Start: 2022-08-08 | End: 2022-08-08

## 2022-08-08 RX ORDER — DOPAMINE HYDROCHLORIDE 40 MG/ML
10 INJECTION, SOLUTION, CONCENTRATE INTRAVENOUS
Qty: 400 | Refills: 0 | Status: DISCONTINUED | OUTPATIENT
Start: 2022-08-08 | End: 2022-08-09

## 2022-08-08 RX ADMIN — Medication 250 MILLIGRAM(S): at 23:20

## 2022-08-08 RX ADMIN — SODIUM CHLORIDE 1000 MILLILITER(S): 9 INJECTION INTRAMUSCULAR; INTRAVENOUS; SUBCUTANEOUS at 22:29

## 2022-08-08 RX ADMIN — DOPAMINE HYDROCHLORIDE 30 MICROGRAM(S)/KG/MIN: 40 INJECTION, SOLUTION, CONCENTRATE INTRAVENOUS at 23:04

## 2022-08-08 NOTE — ED PROVIDER NOTE - OBJECTIVE STATEMENT
81 year old female with major pmhx of Chronic kidney disease, CVA, dementia, hyperlipidemia, hypertension, and hypothyroidism, and no sign pmhx, was brought in by EMS, as notification, patient was in respiratory distress and hypotensive. EMS noted that patient to be unresponsive, blood pressure in field: 50/32. Patient started on dopamine via IO (left leg), and has active MOLST with DNR orders. Patient with shallow respiration, blood pressure 60/40, and dry oral membranes noted. Will admin IV fluids, continue dopamine infusion and will attempt to contact family members considering patient's condition.  Allergies: Levofloxacin (unknown) 81 year old female with major pmhx of Chronic kidney disease, CVA, dementia, hyperlipidemia, hypertension, and hypothyroidism, and no sign pmhx, was brought in by EMS, as notification, patient was in respiratory distress and hypotensive. EMS noted that patient to be unresponsive, blood pressure in field: 50/32. Patient started on dopamine via IO (left leg).  Pt has active MOLST with DNR orders. Patient with shallow respiration, blood pressure 60/40, and dry oral membranes noted. Will admin IV fluids, continue dopamine infusion and will attempt to contact family members considering patient's condition.  Allergies: Levofloxacin (unknown)

## 2022-08-08 NOTE — ED PROVIDER NOTE - PHYSICAL EXAMINATION
Neuro: unresponsive, no spontaneous motion upper  Respiratory: respiratory distress, shallow breathing with forced breath sounds.  EENMT: dry mucous membrane  Cardio: CVS rapid rate, regular rhythm. Neuro: unresponsive, no spontaneous motion upper  Respiratory: respiratory distress, shallow breathing with forced breath sounds.  EENMT: dry mucous membrane  Cardio: rapid rate, regular rhythm. Neuro: unresponsive, no spontaneous motion   Respiratory: respiratory distress, shallow breathing with coarse breath sounds.  EENMT: dry mucous membrane  Cardio: rapid rate, regular rhythm  -+ indwelling mcfadden cath.

## 2022-08-08 NOTE — ED PROVIDER NOTE - EMPLOYMENT
January 15, 2020       Diana Bar MD  [address]  [address]  [facility] [address]  VIA In Basket      Patient: Rojelio León   YOB: 1994   Date of Visit: 1/15/2020       Dear Dr. Bar:    Thank you for referring Rojelio León to me for evaluation. Below are my notes for this visit with him.    If you have questions, please do not hesitate to call me. I look forward to following your patient along with you.      Sincerely,        Tara Vasquez MD        CC: No Recipients  Tara Vasquez MD  1/15/2020  5:05 PM  Sign when Signing Visit  VASCULAR and  VEIN CONSULT    THANK YOU FOR INVOLVING ME IN THIS PATIENT'S CARE    Assessment     Varicose veins of both lower extremities with complications  - US VASC LOWER EXTREMITY VENOUS DUPLEX INSUFFICIENCY; Future  Bilateral, CEAP 4, with symptoms., stasis dermatitis, nonresponsive to conservative therapy for more than 3 months  Will do BL, venous reflux study, continue conservative therapy, - compression stockings  30 mm hg, leg elevation , NSAIDS and diuretics for pain and edema , avoidance of prolonged standing  will proceed with ablation, phlebectomy, sclerotherapy after reviewing venous duplex.  Procedures explained to the patient, written and verbal instructions provided. Risks, complications, benefits discussed at length.  Patient received information and guidance about the above measures   Hand-out informational material and web site information were given         Edema of both lower extremities        History and Physical:  Chief Complaint   Patient presents with   • Consultation     Rojelio is here for a vein consultation       R>L, C/O Dependent edema, painful bulging varicosities,  Bilateral leg pain, heaviness, worse after prolonged standing; Bilateral leg cramping, burning sensation.  Episodes of skin redness, induration, hyperpigmentation.  Has tried conservative measures for more than 3ms   months with compression stockings, 20-30 mmHg,      NSAIDs, PRN diuretics, weight loss, leg elevation without significant changes;   Symptoms interfere with daily walking, and lifestyle.    Review Of Systems:     CVS:No exertional typical chest pain, neck, jaw or arm pain.           No dyspnea on exertion with daily activity, orthopnea, paroxysmal nocturnal dyspnea          No palpitations, dizziness, syncope or diaphoresis.  GI: No history of epigastric pain, nausea, vomiting, hematemesis or melena. No diarrhea or constipation.   Respiratory:  No history of recent fever with chills, purulent expectoration or hemoptysis.                         No history of excessive snoring, day time sleepiness.  CNS: No history of headache, focal weakness or visual disturbances.        MS: no joint pain, no redness, or swelling  Constitutional: No history of weight loss or loss of appetite or weight gain.  : No history of flank pain or hematuria    ENT: No sore throat, no difficulty swallowing, no other significant abnormalities.  Endocrinology: No history Polyuria, Polydipsia, Polyphagia, Hot flashes.  Psych: Adult Depression Screening:  Result- Negative   Skin: no new rashes, lesions, pruritus.    Past Medical History:   History reviewed. No pertinent past medical history.    Social History     Tobacco Use   • Smoking status: Never Smoker   • Smokeless tobacco: Never Used   Substance Use Topics   • Alcohol use: Yes     Frequency: Monthly or less     Drinks per session: 1 or 2   • Drug use: Not on file     Family History   Problem Relation Age of Onset   • Patient is unaware of any medical problems Mother    • Patient is unaware of any medical problems Father        Current Outpatient Medications   Medication Sig Dispense Refill   • vitamin B-12 (CYANOCOBALAMIN) 1000 MCG tablet      • GARLIC PO      • Iodine, Kelp, (KELP PO)      • Omega-3 Fatty Acids (FISH OIL PO)      • Misc Natural Products (LEG VEIN & CIRCULATION PO)      • Homeopathic Products (LIVER SUPPORT SL)      •  Nutritional Supplements (ECHINACEA/GOLDENSEAL IMMUNE PO)        No current facility-administered medications for this visit.          Physical Exam   Visit Vitals  /68   Pulse 56   Ht 5' 8\" (1.727 m)   Wt 68 kg (150 lb)   BMI 22.81 kg/m²       HEENT: No icterus. No cyanosis. No thyromegaly. Obese          Normal carotid upstroke. No carotid bruit.         JVD 4 cm above angle of Felipe in 45 deg.          position.  RS:    Air entry bilaterally equal.         No rales, rub or wheezing.  CVS:   PMI in the 5th left intercostal space 9 cm from          the midsternal line.         S1 N, A2 N, P2 N, S3 No, S4 No.          Mitral area  II/VI ESM Present Tricuspid area Present,                   Aortic area   - , Pulmonary area - .         No pericardial rub. No chest pain on arm          movement or deep breathing.  Abd:   Soft. No hepatosplenomegaly. No palpable or                 pulsatile mass felt. Bowel sound positive.  CNS:  Alert and oriented x 3,  No focal motor deficit.  MS:    No Kyphosis/Scoliosis. Gait normal.  EXTREMITIES:   1+ Edema. No clubbing or cyanosis.DP/PT 1+/2 Bilateral, Femoral 2/2 bilateral. No Bruit.  No brachial femoral delay.  Hyperpigmentation, induration, redness - medial malleolar, with bulging varicosities.                                N/A

## 2022-08-09 VITALS — HEART RATE: 43 BPM

## 2022-08-09 DIAGNOSIS — E03.9 HYPOTHYROIDISM, UNSPECIFIED: ICD-10-CM

## 2022-08-09 DIAGNOSIS — F03.90 UNSPECIFIED DEMENTIA WITHOUT BEHAVIORAL DISTURBANCE: ICD-10-CM

## 2022-08-09 DIAGNOSIS — I10 ESSENTIAL (PRIMARY) HYPERTENSION: ICD-10-CM

## 2022-08-09 DIAGNOSIS — Z51.5 ENCOUNTER FOR PALLIATIVE CARE: ICD-10-CM

## 2022-08-09 DIAGNOSIS — Z29.9 ENCOUNTER FOR PROPHYLACTIC MEASURES, UNSPECIFIED: ICD-10-CM

## 2022-08-09 DIAGNOSIS — J96.01 ACUTE RESPIRATORY FAILURE WITH HYPOXIA: ICD-10-CM

## 2022-08-09 LAB
ANISOCYTOSIS BLD QL: SLIGHT — SIGNIFICANT CHANGE UP
BASOPHILS # BLD AUTO: 0 K/UL — SIGNIFICANT CHANGE UP (ref 0–0.2)
BASOPHILS NFR BLD AUTO: 0 % — SIGNIFICANT CHANGE UP (ref 0–2)
DACRYOCYTES BLD QL SMEAR: SLIGHT — SIGNIFICANT CHANGE UP
ELLIPTOCYTES BLD QL SMEAR: SLIGHT — SIGNIFICANT CHANGE UP
EOSINOPHIL # BLD AUTO: 0 K/UL — SIGNIFICANT CHANGE UP (ref 0–0.5)
EOSINOPHIL NFR BLD AUTO: 0 % — SIGNIFICANT CHANGE UP (ref 0–6)
HYPOCHROMIA BLD QL: SLIGHT — SIGNIFICANT CHANGE UP
LYMPHOCYTES # BLD AUTO: 0.77 K/UL — LOW (ref 1–3.3)
LYMPHOCYTES # BLD AUTO: 7 % — LOW (ref 13–44)
MANUAL SMEAR VERIFICATION: SIGNIFICANT CHANGE UP
METAMYELOCYTES # FLD: 2 % — HIGH (ref 0–0)
MONOCYTES # BLD AUTO: 0.22 K/UL — SIGNIFICANT CHANGE UP (ref 0–0.9)
MONOCYTES NFR BLD AUTO: 2 % — SIGNIFICANT CHANGE UP (ref 2–14)
MYELOCYTES NFR BLD: 1 % — HIGH (ref 0–0)
NEUTROPHILS # BLD AUTO: 9.73 K/UL — HIGH (ref 1.8–7.4)
NEUTROPHILS NFR BLD AUTO: 61 % — SIGNIFICANT CHANGE UP (ref 43–77)
NEUTS BAND # BLD: 27 % — HIGH (ref 0–8)
NRBC # BLD: 0 /100 — SIGNIFICANT CHANGE UP (ref 0–0)
PLAT MORPH BLD: NORMAL — SIGNIFICANT CHANGE UP
POIKILOCYTOSIS BLD QL AUTO: SLIGHT — SIGNIFICANT CHANGE UP
RAPID RVP RESULT: SIGNIFICANT CHANGE UP
RBC BLD AUTO: ABNORMAL
SARS-COV-2 RNA SPEC QL NAA+PROBE: SIGNIFICANT CHANGE UP

## 2022-08-09 PROCEDURE — 83605 ASSAY OF LACTIC ACID: CPT

## 2022-08-09 PROCEDURE — 84484 ASSAY OF TROPONIN QUANT: CPT

## 2022-08-09 PROCEDURE — 87040 BLOOD CULTURE FOR BACTERIA: CPT

## 2022-08-09 PROCEDURE — 93005 ELECTROCARDIOGRAM TRACING: CPT

## 2022-08-09 PROCEDURE — 80053 COMPREHEN METABOLIC PANEL: CPT

## 2022-08-09 PROCEDURE — 85730 THROMBOPLASTIN TIME PARTIAL: CPT

## 2022-08-09 PROCEDURE — 85610 PROTHROMBIN TIME: CPT

## 2022-08-09 PROCEDURE — 85025 COMPLETE CBC W/AUTO DIFF WBC: CPT

## 2022-08-09 PROCEDURE — 82962 GLUCOSE BLOOD TEST: CPT

## 2022-08-09 PROCEDURE — 71045 X-RAY EXAM CHEST 1 VIEW: CPT

## 2022-08-09 PROCEDURE — 99285 EMERGENCY DEPT VISIT HI MDM: CPT

## 2022-08-09 PROCEDURE — 0225U NFCT DS DNA&RNA 21 SARSCOV2: CPT

## 2022-08-09 PROCEDURE — 36000 PLACE NEEDLE IN VEIN: CPT

## 2022-08-09 PROCEDURE — 36415 COLL VENOUS BLD VENIPUNCTURE: CPT

## 2022-08-09 RX ORDER — ONDANSETRON 8 MG/1
4 TABLET, FILM COATED ORAL EVERY 8 HOURS
Refills: 0 | Status: DISCONTINUED | OUTPATIENT
Start: 2022-08-09 | End: 2022-08-09

## 2022-08-09 RX ORDER — LANOLIN ALCOHOL/MO/W.PET/CERES
3 CREAM (GRAM) TOPICAL AT BEDTIME
Refills: 0 | Status: DISCONTINUED | OUTPATIENT
Start: 2022-08-09 | End: 2022-08-09

## 2022-08-09 RX ORDER — ACETAMINOPHEN 500 MG
650 TABLET ORAL EVERY 6 HOURS
Refills: 0 | Status: DISCONTINUED | OUTPATIENT
Start: 2022-08-09 | End: 2022-08-09

## 2022-08-09 RX ADMIN — CEFEPIME 100 MILLIGRAM(S): 1 INJECTION, POWDER, FOR SOLUTION INTRAMUSCULAR; INTRAVENOUS at 00:12

## 2022-08-09 NOTE — ED ADULT NURSE NOTE - OBJECTIVE STATEMENT
Pt was brought in by EMS, as notification, patient was in respiratory distress and hypotensive. EMS noted that patient to be unresponsive, blood pressure in field: 50/32. Patient started on dopamine via IO (left leg).  Pt has active MOLST with DNR orders. Patient with shallow respiration, blood pressure 60/40, and dry oral membranes noted.

## 2022-08-09 NOTE — H&P ADULT - CONVERSATION DETAILS
An extensive goals of care conversation was held including options, risks and benefits of many medical treatments including CPR, intubation, and tube feeding. As per your best interests, you have been made DNR/DNI with comfort measures. A MOLST has been completed to this effect.

## 2022-08-09 NOTE — H&P ADULT - PROBLEM SELECTOR PLAN 1
- P - Pt has a prior MOLST stating she wanted to be comfort measures only.   - Continue with Comfort measures as pts prognosis poor.   - Will consult palliative in the morning.

## 2022-08-09 NOTE — H&P ADULT - ATTENDING COMMENTS
Pt found on presentation with hypoxic respiratory failure and on the sukhjinder of septic shock likely secondary to community acquired pneumonia. Pt was found as such by her home aid with no next of kin available to act as her expressed hcp, however pt's wishes of DNR/DNI have been confirmed by ER via pt's home care aid. Most resuscitative measures involving this infectious process have been initiated; IVF, abx and noninvasive oxygen delivery. Will follow u/a, u/c & b/c in time. ID & Pulm consulted. Palliative consult to follow pending pt's response in the next 4 hrs to the above . Pt found on presentation with hypoxic respiratory failure and on the sukhjinder of septic shock likely secondary to community acquired pneumonia. Pt was found as such by her home aid with no next of kin available to act as her expressed hcp, however pt's wishes of DNR/DNI have been confirmed by ER via pt's home care aid. Most resuscitative measures involving this infectious process have been initiated; IVF, abx and noninvasive oxygen delivery. Will follow u/a, u/c & b/c in time. ID & Pulm consulted. Palliative consult to follow pending pt's response in the next 24 hrs to the above .

## 2022-08-09 NOTE — H&P ADULT - ASSESSMENT
This is a 81 year old female with medical history of Chronic kidney disease, CVA, dementia, hyperlipidemia, hypertension, and hypothyroidism came in for unresponsiveness.

## 2022-08-09 NOTE — H&P ADULT - PROBLEM SELECTOR PLAN 4
- Pt has a history of HTN.   - Will hold off on any medication as pt is hypotensive.   - PT is comfort measures.

## 2022-08-09 NOTE — H&P ADULT - HISTORY OF PRESENT ILLNESS
This is a 81 year old female with medical history of Chronic kidney disease, CVA, dementia, hyperlipidemia, hypertension, and hypothyroidism came in for unresponsiveness. As per ED attending pt was brought in hypotensive and nonresponsive, Pts BP on admission was 60/40 as per EMS documentation she was found in the field with BP of 50/32. Pt has a home health aid at bed side who states pt is DNI/DNR and has paperwork which shows pt is also comfort measures. Pts prognosis is very poor.

## 2022-08-09 NOTE — DISCHARGE NOTE FOR THE EXPIRED PATIENT - HOSPITAL COURSE
This is a 81 year old female with medical history of Chronic kidney disease, CVA, dementia, hyperlipidemia, hypertension, and hypothyroidism came in for unresponsiveness. As per        EMS noted that patient to be unresponsive, blood pressure in field: 50/32. Patient started on dopamine via IO (left leg).  Pt has active MOLST with DNR orders. Patient with shallow respiration, blood pressure 60/40, and dry oral membranes noted. Will admin IV fluids, continue dopamine infusion and will attempt to contact family members considering patient's condition. This is a 81 year old female with medical history of Chronic kidney disease, CVA, dementia, hyperlipidemia, hypertension, and hypothyroidism came in for unresponsiveness. As per ED attending pt was brought in hypotensive and nonresponsive, Pts BP on admission was 60/40 as per EMS documentation she was found in the field with BP of 50/32. Pt has a home health aid at bed side who states pt is DNI/DNR and has paperwork which shows pt is also comfort measures. Pts prognosis is very poor.

## 2022-08-09 NOTE — H&P ADULT - PROBLEM/PLAN-1
"Chief Complaint   Patient presents with     Physical     /79   Pulse 64   Temp 98.5  F (36.9  C) (Oral)   Resp 16   Ht 1.803 m (5' 11\")   Wt 71.4 kg (157 lb 6.4 oz)   SpO2 100%   BMI 21.95 kg/m   Estimated body mass index is 21.95 kg/m  as calculated from the following:    Height as of this encounter: 1.803 m (5' 11\").    Weight as of this encounter: 71.4 kg (157 lb 6.4 oz).  Medication Reconciliation: complete        Health Maintenance Due Pending Provider Review:  NONE    n/a    Tiff Hernandez MA  [facility]  [phone]  " DISPLAY PLAN FREE TEXT

## 2022-08-09 NOTE — H&P ADULT - PROBLEM SELECTOR PLAN 2
- Pt presented with unresponsiveness to the ED.   - Continues to require 10L NRB to keep - Pt presented with unresponsiveness to the ED.   - Continues to require 10L NRB to keep.   - Pt has poor prognosis  - MOLST for DNR/DNI with comfort measures.

## 2022-08-09 NOTE — ED ADULT NURSE REASSESSMENT NOTE - NS ED NURSE REASSESS COMMENT FT1
Pt noted with HR 38-40, MD at bedside aware. As per MD, comfort measures only. Will continue to monitor.

## 2022-08-14 LAB
CULTURE RESULTS: SIGNIFICANT CHANGE UP
CULTURE RESULTS: SIGNIFICANT CHANGE UP
SPECIMEN SOURCE: SIGNIFICANT CHANGE UP
SPECIMEN SOURCE: SIGNIFICANT CHANGE UP

## 2022-08-17 NOTE — PHYSICAL THERAPY INITIAL EVALUATION ADULT - LEVEL OF INDEPENDENCE: SUPINE/SIT, REHAB EVAL
[facility] Encounter     Basic Information:  Patient: Leticia Macias Age: 54 year old Sex: female  MRN: [id number] Encounter Date: 8/17/2022    Pt's PCP is Alison Brito CNP      Chief Complaint  Chief Complaint   Patient presents with   • Shoulder Pain       History of Present Illness    The patient reports that she had injections in the neck last week.  Reports that she was doing well until Saturday when she developed a tight and spasm type pain in the right side of the neck and shoulder.  No CP or SOB.  Worse with certain arm movements.  Pain ranked 7/10.    Health Status  Allergies:  ALLERGIES:   Allergen Reactions   • Latex   (Environmental) HIVES       Current Medications:  (Not in a hospital admission)      Past Medical History    Past Medical History:   Diagnosis Date   • GERD (gastroesophageal reflux disease)    • IBS (irritable bowel syndrome)    • Migraines    • Thyroid disease        Past Surgical History:   Procedure Laterality Date   • Appendectomy     • Exploratory of abdomen     • Hysterectomy         Family History   Problem Relation Age of Onset   • Heart Father    • Hypertension Father    • Hyperlipidemia Father    • Myocardial Infarction Father    • Diabetes Father    • Emphysema Father    • Cancer Mother    • Migraine Sister    • Diabetes Maternal Aunt    • Migraine Son    • Cancer Paternal Grandmother        Social History     Tobacco Use   • Smoking status: Never Smoker   • Smokeless tobacco: Never Used   Vaping Use   • Vaping Use: never used   Substance Use Topics   • Alcohol use: No   • Drug use: No         ROS:   10 point ROS negative except as noted above in HPI.    Physical Exam:  Time:  ED Triage Vitals [08/16/22 1652]   BP (!) 133/95   Heart Rate (!) 140   Resp 20   Temp 98.8 °F (37.1 °C)   SpO2 96 %       Physical Exam  Constitutional:       General: She is not in acute distress.     Appearance: Normal appearance. She is not ill-appearing, toxic-appearing or  [facility]    63 Glover Street [address] [phone]             2.  Follow up is needed :   > for re-examination.    3.  Recommended  returning to the ED for any change in symptoms or status.    Discharged to Home .      Counseled:  Patient, Family, Regarding diagnosis, Regarding diagnostic results, Regarding treatment, Regarding prescriptions, Patient understood and Family understood        Apolinar Enriquez MD, 8/17/2022                 Apolinar Enriquez MD  08/17/22 0000

## 2022-08-25 NOTE — ED ADULT TRIAGE NOTE - CCCP TRG CHIEF CMPLNT
see chief complaint quote/mcfadden catheter leakage W Plasty Text: The lesion was extirpated to the level of the fat with a #15 scalpel blade.  Given the location of the defect, shape of the defect and the proximity to free margins a W-plasty was deemed most appropriate for repair.  Using a sterile surgical marker, the appropriate transposition arms of the W-plasty were drawn incorporating the defect and placing the expected incisions within the relaxed skin tension lines where possible.    The area thus outlined was incised deep to adipose tissue with a #15 scalpel blade.  The skin margins were undermined to an appropriate distance in all directions utilizing iris scissors.  The opposing transposition arms were then transposed into place in opposite direction and anchored with interrupted buried subcutaneous sutures.

## 2023-03-06 NOTE — H&P ADULT - I WAS PHYSICALLY PRESENT FOR THE KEY PORTIONS OF THE EVALUATION AND MANAGEMENT (E/M) SERVICE PROVIDED.  I AGREE WITH THE ABOVE HISTORY, PHYSICAL, AND PLAN WHICH I HAVE REVIEWED AND EDITED WHERE APPROPRIATE
From: Shea Perez  To: Steven Laughlin  Sent: 3/6/2023 8:45 AM CST  Subject: My rosacea    I have an appointment with Dr. Laughlin next month. My rosacea has flared up this winter. Along with my face being very dry. I was wondering if  can suggest something that will help calm it, until my appointment. I am using Cetaphil gentle skin cleanser and the dry to normal moisturizer. I still use the Fluocinolone ointment on the red chapped spot. Thank you.   Statement Selected

## 2023-04-27 NOTE — DISCHARGE NOTE ADULT - MEDICATION SUMMARY - MEDICATIONS TO STOP TAKING
Pericarditis following PCI. Improving on ASA, though with mild epistaxis. Echocardiogram without effusion  - Continue with ASA 81 mg PO Qd I will STOP taking the medications listed below when I get home from the hospital:  None

## 2023-05-22 NOTE — PROGRESS NOTE ADULT - PROBLEM SELECTOR PROBLEM 7
Group Therapy Note    Date: 5/22/2023    Group Start Time: 0930  Group End Time: 1005  Group Topic: Community Meeting    DARINEL Cotton LPN        Group Therapy Note    Attendees: 13/20       Patient's Goal:  D/C    Status After Intervention:  Improved    Participation Level:  Active Listener and Interactive    Participation Quality: Appropriate, Attentive, and Sharing      Speech:  normal      Thought Process/Content: Logical      Affective Functioning: Flat      Mood: anxious      Level of consciousness:  Oriented x4      Response to Learning: Able to verbalize current knowledge/experience, Able to verbalize/acknowledge new learning, and Able to retain information      Endings: None Reported    Modes of Intervention: Education, Support, Socialization, and Activity      Discipline Responsible: Licensed Practical Nurse      Signature:  Alessandro Lawler LPN CVA (cerebral vascular accident) Diabetes mellitus Hypothyroidism

## 2023-08-28 NOTE — PATIENT PROFILE ADULT - NSPROPTRIGHTNOTIFY_GEN_A_NUR
HR=62 bpm, NIBP=99/56 mmhg, SpO2=96 %, Resp=17 B/min, EtCO2=22 mmHg, Apnea=3 Seconds information could not be obtained

## 2023-09-17 NOTE — ED ADULT TRIAGE NOTE - SOURCE OF INFORMATION
Patient arrives to ed for c/o mid chest pain with SOB that started yesterday around 8 pm. Patient AO x4. Denies any nausea, vomiting, or anxiety. Denies SOB in triage. H/O HTN   EMS

## 2023-09-25 NOTE — ED PROVIDER NOTE - NS ED SCRIBE STATEMENT
Attending No Residual Tumor Seen Histology Text: There were no malignant cells seen in the sections examined.

## 2023-11-20 NOTE — ED ADULT NURSE NOTE - CHIEF COMPLAINT QUOTE
Pt needs Effexor to be changed to the generic so insurance will cover it. BIBA for  c/o  altered mental status and rt sided  facial droop  about an hour ago as per  HHA .

## 2024-07-15 NOTE — ED PROVIDER NOTE - OBJECTIVE STATEMENT
78 y/o F pt w/ PMHx of ALS (bed bound, from home, DNR but not DNI) pt coming in w/ respiratory distress. According to EMS pt was found to be satting in the 80s w/ coarse breath sounds throughout and "unresponsive". Intubation was attempted in the field after 20 etomidate but was unsuccessful; pt arrived in ED w/ Ambu bag and oral airway in place. NKDA.
pt s/p wound vac placed yesterday c/o abdominal pain and diarrhea this morning.

## 2024-12-09 NOTE — ED ADULT TRIAGE NOTE - DIRECT TO ROOM CARE INITIATED:
Methotrexate  Last Written Prescription Date:  10-9-24  Last Fill Quantity: 24,   # refills: 2  Last Office Visit: 11-11-24  Future Office visit:  2-17-25       CBC RESULTS:   Recent Labs   Lab Test 12/06/24  1359   WBC 8.0   RBC 4.57   HGB 13.4   HCT 41.4   MCV 91   MCH 29.3   MCHC 32.4   RDW 12.9          Creatinine   Date Value Ref Range Status   12/06/2024 0.90 0.51 - 0.95 mg/dL Final   02/26/2019 0.98 0.52 - 1.04 mg/dL Final       Liver Function Studies -   Recent Labs   Lab Test 12/06/24  1359   PROTTOTAL 7.2   ALBUMIN 4.4   BILITOTAL 0.4   ALKPHOS 72   AST 51*   ALT 42       Routing refill request to provider for review/approval because: DMARD    Karie Cohen RN       12-Feb-2018 08:52

## 2025-03-09 NOTE — ED PROVIDER NOTE - DISCHARGE DATE
Problem: Hemodialysis  Goal: Dialysis: Safe, effective, and comfortable hemodialysis treatment (Hemodialysis nurse only)  Outcome: Monitoring/Evaluating progress  Note: Completed 3 hours of ordered 3.5 hours HD with 1.7L ultrafiltration. Midodrine and one albumin administered to prevent hypotension. One 100ml NS bolus administered for hypotension and fluid removal goal decreased. Two hours into treatment venous pressure alarms indicated system clotting despite use of increased dose heparin and 100ml Q30 minutes flushes. System changed out and treatment resumed. With 30 minutes left of treatment, TMP greater than 100 and no resolution with 100ml flush. Updated Dr Noel, and quynh to discontinue treatment.   Pre-dialysis weight= 104.6 kg per bed scale (pt unable to stand for standing scale for enough time to weigh)  Post-dialysis weight= 102.5 kg per bed scale  Goal: Dialysis: Free of complications related to initiation/termination of dialysis (Hemodialysis nurse only)  Outcome: Monitoring/Evaluating progress  Note: Able to achieve -250 with lines reversed due to persistent arterial alarms despite having pt change positions.       04-Mar-2020

## 2025-04-22 NOTE — ED ADULT NURSE NOTE - FINAL NURSING ELECTRONIC SIGNATURE
Labs reviewed in EPIC from 4/21/25. Hgb/hct noted. Will add iron, TIBC, ferritin, t sat to next lab draw as outpatient.   
15-Feb-2020 12:13

## 2025-04-22 NOTE — ED ADULT TRIAGE NOTE - WEIGHT IN KG
How Severe Are Your Spot(S)?: mild What Type Of Note Output Would You Prefer (Optional)?: Standard Output What Is The Reason For Today's Visit?: Full Body Skin Examination What Is The Reason For Today's Visit? (Being Monitored For X): concerning skin lesions on an annual basis 68

## 2025-05-29 NOTE — PROGRESS NOTE ADULT - SUBJECTIVE AND OBJECTIVE BOX
Detail Level: Detailed INTERVAL HPI/OVERNIGHT EVENTS: Pt was hypertensive to 180. Hydralazine pushes were given.     PRESSORS: [ ] YES [x] NO    ANTIBIOTICS: Tamiflu                 DATE STARTED: Feb 12  ANTIBIOTICS: Zithromax             DATE STARTED: Feb 12  ANTIBIOTICS: Rocephin              DATE STARTED: Feb 12    Antimicrobial:  azithromycin  IVPB 500 milliGRAM(s) IV Intermittent every 24 hours  cefTRIAXone   IVPB 1 Gram(s) IV Intermittent every 24 hours  oseltamivir 30 milliGRAM(s) Oral daily    Hematalogic:  aspirin  chewable 81 milliGRAM(s) Oral daily  heparin  Injectable 5000 Unit(s) SubCutaneous every 12 hours    Other:  acetaminophen    Suspension 650 milliGRAM(s) Oral every 6 hours PRN  atorvastatin 20 milliGRAM(s) Oral at bedtime  baclofen 10 milliGRAM(s) Oral three times a day  chlorhexidine 4% Liquid 1 Application(s) Topical daily  citalopram 5 milliGRAM(s) Oral at bedtime  dexmedetomidine Infusion 0.2 MICROgram(s)/kG/Hr IV Continuous <Continuous>  donepezil 10 milliGRAM(s) Oral at bedtime  insulin glargine Injectable (LANTUS) 10 Unit(s) SubCutaneous at bedtime  insulin lispro (HumaLOG) corrective regimen sliding scale   SubCutaneous every 6 hours  insulin lispro Injectable (HumaLOG) 3 Unit(s) SubCutaneous three times a day with meals  levothyroxine 25 MICROGram(s) Oral daily  pantoprazole  Injectable 40 milliGRAM(s) IV Push daily  raloxifene 60 milliGRAM(s) Oral daily    acetaminophen    Suspension 650 milliGRAM(s) Oral every 6 hours PRN  aspirin  chewable 81 milliGRAM(s) Oral daily  atorvastatin 20 milliGRAM(s) Oral at bedtime  azithromycin  IVPB 500 milliGRAM(s) IV Intermittent every 24 hours  baclofen 10 milliGRAM(s) Oral three times a day  cefTRIAXone   IVPB 1 Gram(s) IV Intermittent every 24 hours  chlorhexidine 4% Liquid 1 Application(s) Topical daily  citalopram 5 milliGRAM(s) Oral at bedtime  dexmedetomidine Infusion 0.2 MICROgram(s)/kG/Hr IV Continuous <Continuous>  donepezil 10 milliGRAM(s) Oral at bedtime  heparin  Injectable 5000 Unit(s) SubCutaneous every 12 hours  insulin glargine Injectable (LANTUS) 10 Unit(s) SubCutaneous at bedtime  insulin lispro (HumaLOG) corrective regimen sliding scale   SubCutaneous every 6 hours  insulin lispro Injectable (HumaLOG) 3 Unit(s) SubCutaneous three times a day with meals  levothyroxine 25 MICROGram(s) Oral daily  oseltamivir 30 milliGRAM(s) Oral daily  pantoprazole  Injectable 40 milliGRAM(s) IV Push daily  raloxifene 60 milliGRAM(s) Oral daily    Drug Dosing Weight  Height (cm): 160.02 (12 Feb 2018 08:58)  Weight (kg): 63.5 (12 Feb 2018 08:58)  BMI (kg/m2): 24.8 (12 Feb 2018 08:58)  BSA (m2): 1.66 (12 Feb 2018 08:58)    CENTRAL LINE: [ ] YES [x] NO  LOCATION:   DATE INSERTED:    SANDERS: [x] YES [ ] NO    DATE INSERTED:    A-LINE:  [ ] YES [x] NO  LOCATION:   DATE INSERTED:    ICU Vital Signs Last 24 Hrs  T(C): 36.1 (15 Feb 2018 06:00), Max: 37.2 (14 Feb 2018 19:53)  T(F): 96.9 (15 Feb 2018 06:00), Max: 98.9 (14 Feb 2018 19:53)  HR: 75 (15 Feb 2018 08:28) (58 - 107)  BP: 124/54 (15 Feb 2018 08:00) (118/46 - 185/67)  BP(mean): 76 (15 Feb 2018 08:00) (67 - 100)  ABP: --  ABP(mean): --  RR: 16 (15 Feb 2018 08:00) (14 - 28)  SpO2: 95% (15 Feb 2018 08:28) (92% - 100%)      ABG - ( 15 Feb 2018 04:41 )  pH: 7.39  /  pCO2: 30    /  pO2: 95    / HCO3: 18    / Base Excess: -6.0  /  SaO2: 97                    02-14 @ 07:01  -  02-15 @ 07:00  --------------------------------------------------------  IN: 1262.5 mL / OUT: 1010 mL / NET: 252.5 mL        Mode: CPAP with PS  FiO2: 50  PEEP: 5  PS: 5      PHYSICAL EXAM:    GENERAL: Well developed old female, NAD due to sedation  HEENT:  Normocephalic/Atraumatic, reactive light reflex, moist mucous membranes  NECK: Supple, no JVD  RESP: Symmetric movement of the chest, clear to auscultation bilaterally  CVS: Tachycardia, S1 and S2 audible, no murmur, rubs or gallops  GI: Normal active bowel sounds present, abdomen soft, non tender, non distended  EXTREMITIES:  no edema, no clubbing, cyanosis  MSK: 0/5 strength bilateral upper and lower extremities  PSYCH: Sedated  NEURO: alert and oriented x 0 due to sedation    LABS:  CBC Full  -  ( 15 Feb 2018 06:22 )  WBC Count : 9.4 K/uL  Hemoglobin : 9.8 g/dL  Hematocrit : 31.7 %  Platelet Count - Automated : 168 K/uL  Mean Cell Volume : 88.3 fl  Mean Cell Hemoglobin : 27.2 pg  Mean Cell Hemoglobin Concentration : 30.8 gm/dL  Auto Neutrophil # : x  Auto Lymphocyte # : x  Auto Monocyte # : x  Auto Eosinophil # : x  Auto Basophil # : x  Auto Neutrophil % : x  Auto Lymphocyte % : x  Auto Monocyte % : x  Auto Eosinophil % : x  Auto Basophil % : x    02-15    141  |  111<H>  |  49<H>  ----------------------------<  278<H>  4.9   |  18<L>  |  1.93<H>    Ca    8.3<L>      15 Feb 2018 06:22  Phos  3.3     02-15  Mg     2.3     02-15          Culture Results:   Normal Respiratory Luz present (02-13 @ 18:44)  Culture Results:   Culture grew 3 or more types of organisms which indicate  collection contamination; consider recollection only if clinically  indicated. (02-12 @ 13:40)  Culture Results:   No growth to date. (02-12 @ 13:37)  Culture Results:   No growth to date. (02-12 @ 13:37)    [ ]GOALS OF CARE DISCUSSION WITH PATIENT/FAMILY/PROXY: DNR, but ok for intubation    CRITICAL CARE TIME SPENT: 35 minutes